# Patient Record
Sex: FEMALE | Race: WHITE | HISPANIC OR LATINO | Employment: PART TIME | URBAN - METROPOLITAN AREA
[De-identification: names, ages, dates, MRNs, and addresses within clinical notes are randomized per-mention and may not be internally consistent; named-entity substitution may affect disease eponyms.]

---
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
## 2020-01-02 ENCOUNTER — OFFICE VISIT (OUTPATIENT)
Dept: RHEUMATOLOGY | Facility: MEDICAL CENTER | Age: 20
End: 2020-01-02
Payer: MEDICAID

## 2020-01-02 VITALS
HEIGHT: 62 IN | BODY MASS INDEX: 24.51 KG/M2 | HEART RATE: 73 BPM | TEMPERATURE: 98 F | OXYGEN SATURATION: 96 % | DIASTOLIC BLOOD PRESSURE: 64 MMHG | SYSTOLIC BLOOD PRESSURE: 90 MMHG | WEIGHT: 133.2 LBS

## 2020-01-02 DIAGNOSIS — M32.9 SYSTEMIC LUPUS ERYTHEMATOSUS, UNSPECIFIED SLE TYPE, UNSPECIFIED ORGAN INVOLVEMENT STATUS (HCC): Primary | ICD-10-CM

## 2020-01-02 DIAGNOSIS — I73.00 RAYNAUD'S DISEASE WITHOUT GANGRENE: ICD-10-CM

## 2020-01-02 DIAGNOSIS — R21 RASH AND NONSPECIFIC SKIN ERUPTION: ICD-10-CM

## 2020-01-02 DIAGNOSIS — Z79.899 LONG-TERM USE OF PLAQUENIL: ICD-10-CM

## 2020-01-02 DIAGNOSIS — D72.810 LYMPHOPENIA: ICD-10-CM

## 2020-01-02 DIAGNOSIS — M35.1 MCTD (MIXED CONNECTIVE TISSUE DISEASE) (HCC): ICD-10-CM

## 2020-01-02 DIAGNOSIS — M31.19 THROMBOTIC THROMBOCYTOPENIC PURPURA (TTP) (HCC): ICD-10-CM

## 2020-01-02 DIAGNOSIS — R79.89 ELEVATED LFTS: ICD-10-CM

## 2020-01-02 DIAGNOSIS — Z79.899 HIGH RISK MEDICATION USE: ICD-10-CM

## 2020-01-02 PROCEDURE — 99214 OFFICE O/P EST MOD 30 MIN: CPT | Performed by: INTERNAL MEDICINE

## 2020-01-02 NOTE — PROGRESS NOTES
RHEUMATOLOGY CLINIC FOLLOW UP NOTE        Chief complaint:  follow up SLE        HPI:  20 y/o F with MCTD/SLE presents today for follow up,  last seen by me 4 weeks ago.      She remains on Plaquenil alternating 200 mg and 400 mg every other day,Imuran 75mg daily (decreased from 100mg at last visit), and now prednisone 20mg daily for the last 3 days.  She was doing well on prednisone 10 mg daily however the day before [place] states that she developed a rash on the back of her arms, regionally started as erythematous.  She doubled her dose of prednisone at my instruction 3 days ago and has started improving.  It was more pruritic than painful.  No rashes anywhere else.  She denies any fevers, oral ulcers, joint swelling or pain, chest pain, or shortness of breath.  She has had no recurrence of the rash on her lower extremities or on her face.  Denies any infections.  She reports that she was feeling very well prior to the onset of the rash and is still feeling well at this time.     Reports she is still not sexually active.  She has not seen gynecology yet to discuss birth control.    Patient had labs done today we do not have the results yet.                       Rheum Background:  Per Dr. Jean's notes:  Ms. Estelle Cowart presents today for evaluation for positive DENILSON in the context of positive SSA, Galeas, RNP low C4 as well as  TTP.     The patient recalls that she started to feel ill in November with no improvement.  She was then found to have thrombocytopenia and diagnosed with TTP 11/22/2017 with emergent transfer to [place] for plasma exchange.  While at [place] she was found to have positive serologies DENILSON 1:2560 and positive anti Ro, anti Galeas and Anti RNP.       Other associated symptoms she has experienced include decrease appetite, mild bruising, headaches associated with her episodes of low platelet,  and seen at [place] and treated with Rituximab and Plasmapheresis.      She does report a history  of Raynauds since childhood.  Her fingers will turn white and purple extending from the distal fingers up to the PIP joint.   Toes will also white.    She also reports hair loss that started after her hospitilization.  No sicca symptoms.  No rashes.  No mouth ulcers.      When we first met she had hair loss and prolonged morning stiffness      patient was admitted to the hospital on September 8 for fevers from 10 2-1 03.  This was associated with sore throat as well as nausea.  She did have some mild anemia however no evidence of hemolysis.  She was empirically provided with Augmentin, however treated for SLE flare with prednisone 40 mg daily.  She took this for 1 week and taper down to 30 mg at my direction at the end of last week.  2 to 3 days after this decreased she began developing a rash on her bilateral lower extremities, upper extremities and abdomen.  The rash resolved on her abdomen and arms, however has persisted on her legs though has been slowly improving.  She did go to the ED at which point labs were done and found to have stable hemoglobin and platelets.  She is also since followed up with her hematologist oncologist with stable hematologic work-up.        Meds:  Last infusion of Rituxan was January 2018.  Plaquenil  Imuran- started 10/2019      Pertinent Positives  FARA IgG positive, FARA C3d negative; ADAMTS acitivity < 5  DENILSON 1:2560, speckled  dsDNA +  SSA +  RNP positive at 224  Galeas positive  SSB negative  SM negative  Centromere and SCL-70 negative  TPO and microsomal ab negative  RF negative  Lupus anticoagulant negative     Hep B/C serologies negative 12/2017  Quantiferon negative 4/2018  G6PD adequate 4/2018     Imaging:     TTE 9/2018:  Normal left ventricular chamber size.  Left ventricular ejection fraction is visually estimated to be 65%.  Normal diastolic function.  No significant valve abnormalities.   Normal inferior vena cava size and inspiratory collapse.        Past Medical History:  "vitiligo, TTP, MCTD     Family History: hyperlipidemia; grandmother had diabetes     Social History: NEVER smoker, no alcohol            ROS:    GEN: denies fevers  Head: denies focal alopecia or hair thinning  ENT: denies change in vision, denies oral ulcers  CV:  no palpitations, no chest dyscomfort, no orthopnea  Pulm: no dyspnea, no cough  Skin: Per HPI  MS: no back pain, no joint swelling            OUTPATIENT MEDS:    Prior to Admission medications    Medication Sig Start Date End Date Taking? Authorizing Provider   azaTHIOprine (IMURAN) 50 MG Tab Take 1.5 Tabs by mouth every day. 12/4/19   Naomie Deshpande M.D.   predniSONE (DELTASONE) 10 MG Tab TAKE 1 TABLET BY MOUTH EVERY DAY 12/2/19   Naomie Deshpande M.D.   famotidine (PEPCID) 20 MG Tab Take 1 Tab by mouth 2 times a day.  Patient not taking: Reported on 11/14/2019 10/9/19   Naomie Deshpande M.D.   hydroxychloroquine (PLAQUENIL) 200 MG Tab Take 1-2 Tabs by mouth every day. 200 mg on Tuesday, Thursday, Saturday, Sunday and 400 mg on Monday, Wednesday, Friday 10/9/19   Naomie Deshpande M.D.   acetaminophen (TYLENOL) 325 MG Tab Take 650 mg by mouth every four hours as needed for Mild Pain.    Nn Emergency Md Per Protocol, M.D.           Past Medical History:   Diagnosis Date   • Alopecia    • Anemia    • Mixed connective tissue disease (HCC)    • Raynaud phenomenon    • SLE (systemic lupus erythematosus) ([facility])    • TTP (thrombotic thrombocytopenic purpura) (HCC)    • TTP (thrombotic thrombocytopenic purpura) ([facility])    • Vitiligo             reports that she has never smoked. She has never used smokeless tobacco. She reports that she does not drink alcohol or use drugs.             Physical Exam:     BP (!) 90/64 (BP Location: Left arm, Patient Position: Sitting, BP Cuff Size: Adult)   Pulse 73   Temp 36.7 °C (98 °F) (Temporal)   Ht 1.562 m (5' 1.5\")   Wt 60.4 kg (133 lb 3.2 oz)   SpO2 96%   BMI 24.76 kg/m²         GEN: well-appearing, NAD  Head: no focal " alopecia, no hair thinning  ENT: no conjunctival icterus or injection, no LAD, no oral ulcers  CV: nml S1, S2, no murmurs, RRR  Pulm: normal chest expansion, speaking in full sentences, CTAB, no wheezing  MUSCUSKELETAL:  no edema, dactylitis, entesitis     SHOULDERs:M no tenderness  ELBOWS:  no tenderness no synovitis  WRISTS:   no tenderness no synovitis  MCPS:   no tenderness no synovitis  PIPS:    no tenderness no synovitis  DIPS: no tenderness no synovitis  KNEES:  no tenderness no synovitis              ANKLES:  no tenderness no synovitis           MTPS: no tenderness  IP TOES: no tenderness no synovitis  SKIN: There is a nonraised, macular annular hyperpigmented, blanching rash on posterior bilateral upper arms extending to flank area              Labs:    Results for orders placed or performed during the hospital encounter of 10/25/19   EC-ECHOCARDIOGRAM COMPLETE W/O CONT   Result Value Ref Range    Eject.Frac. MOD BP 54.15     Eject.Frac. MOD 4C 54.42     Eject.Frac. MOD 2C 54.7     Left Ventrical Ejection Fraction 55          Impression/Plan:  1. Systemic lupus erythematosus, unspecified SLE type, unspecified organ involvement status (HCC) with flare  2.  Rash  3.  MCTD  4. Raynaud's disease without gangrene     Positive DENILSON, dsDNA, SM, RNP, SSA, and FARA IgG     SLE manifestations have been TTP requiring plasma exchange and rituxan, Raynaud's, hair loss, and morning stiffness.  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  New rash beginning on upper arms bilaterally resolving with increased prednisone.  Not consistent with palpable purpura.    We have added Imuran to help control her overall lupus, however unfortunately she did develop a rash while on this and had lymphopenia at the last visit requiring a decrease dose.  Repeat labs are now pending however given the  development of rash, I do not feel this is controlling her disease adequately we have been unable to get her up to have therapeutic dose due to lymphopenia.  I discussed with the patient alternative treatment options at this time including CellCept, Rituxan and Benlysta.  Although Rituxan is not generally used as a lupus maintenance medication, she was previously doing very well until this likely wore off, she has had TTP and vasculitis which are responsive to Rituxan, and there has been a recent publication supporting the use of Rituxan as a maintenance medication and SLE (PMID: 30111477).  After discussing these medications with the patient, patient does prefer to do another dose of Rituxan given how good she felt on it prior.  Risks of infection, infusion reaction and PML reviewed with the patient.     Labs December 2019 reviewed  CBC with WBC 2.8 and lymphopenia to 526, hemoglobin 11.5, platelets 346  CMP with potassium at 3.6, AST 46 (ULN 32), ALT 47 (ULN 32)  ESR improved to 48 (prior was 75), CRP now normal at 1.2 (prior 14.4)  C3 is low at 72 and C4 at 14, prior were both normal     Hep B/C serologies negative 12/2017  Quantiferon negative 4/2018  TPMT normal 10/2019     TTE 10/2019  Prior Echo - 9/24/18, no significant changes are noted.  Left ventricular ejection fraction is visually estimated to be 55%.  Normal diastolic function.  Unable to estimate pulmonary artery pressure due to an inadequate   tricuspid regurgitant jet.        Plan:  -Continue Imuran to 75 mg daily for now  -As above once labs return we will plan to start Rituxan, advised to stop and oriented few days prior to scheduled infusion  -Continue prednisone-20 mg daily for another 2 days, then 15 mg daily for 5 days, then return to 10 mg daily  - Continue hydroxychloroquine dose at  400mg on MWF and 200mg every other day  -Repeat CBC, CMP, ESR, CRP, C3, C4, and UA pending, patient had done today  -Recommend TTE annually given mixed  connective tissue disease picture for monitoring of RVSP, next due October 2020        5. Thrombotic thrombocytopenic purpura (TTP) (HCC)  6.  Lymphopenia  Labs November 2019 stable as above     Per hematology note 12/2018:  Diagnosed 11/22/17  Platelets 11, Hgb 7.5, Cr 0.63, LDH 2355  FARA IgG Positive, FARA C3d negative  [id number] 7 (11/23/17)   HUS negative  HAV IgG positive, Hep B negative, Hep C negative  Plasma exchange, 1.5 x volume with FFP daily x 5 days  Stable labs until 12/21/17 when platelets began to drop to 102, normal LDH, no MAHA findings on peripheral smear  Full relapse of disease with increased LDH to 452,  platelets to 15 and drop in Hgb to 8.2 -12/27  Admission for plasma exchange 12/28/17  S/P Rituximab Therapy 1/8/18, 1/15/18, 1/22/18, 1/29/18     Platelets and hemoglobin remained stable at this time  Patient aware to go to directly to ER for any worsening or confluence of rash     Continue follow-up with hematology     7. Long-term use of Plaquenil  CBC and CMP q. 6 months while on plaquenil- next due in July  Patient reports that she saw ophtho at the end of last year and was cleared to continue     Plan:  -CBC, CMP every 6 months while on Plaquenil  -Continue follow-up with ophthalmology at least annually- patient reports she saw 6/2019      8.  High risk medication use  Discussed risks of Imuran including GI upset, rash and infection and lab abnormalities  CBC, CMP in  every 3 months while on Imuran  Risks of Rituxan including infection, infusion reaction and PML reviewed with patient    9.  Elevated LFTs  AST and ALT both mildly elevated on most recent labs again, I do question if this could be related to her underlying disease  Repeat labs pending  Right upper quadrant ultrasound December 2019 unremarkable                       Patient will be in contact with me for any questions or concerns, will otherwise follow up with me as scheduled in 4 weeks.    This note was generated  using voice recognition software which has a small chance of producing errors of grammar and possibly content.  I have made every reasonable attempt to find and correct any obvious errors, but expect that some may not be found prior to finalization of this note.             Naomie Deshpande MD

## 2020-01-15 DIAGNOSIS — M32.9 SYSTEMIC LUPUS ERYTHEMATOSUS, UNSPECIFIED SLE TYPE, UNSPECIFIED ORGAN INVOLVEMENT STATUS (HCC): ICD-10-CM

## 2020-01-15 RX ORDER — PREDNISONE 10 MG/1
10 TABLET ORAL
Qty: 90 TAB | Refills: 0 | Status: SHIPPED | OUTPATIENT
Start: 2020-01-15 | End: 2020-02-06 | Stop reason: SDUPTHER

## 2020-01-15 NOTE — TELEPHONE ENCOUNTER
Was the patient seen in the last year in this department? Yes    Does patient have an active prescription for medications requested? No     Received Request Via: Pharmacy       I called infusion center regarding Rituxan and they are behind on faxes but they asked me to have patient called them to schedule. I provided this information to Patient

## 2020-01-22 ENCOUNTER — TELEPHONE (OUTPATIENT)
Dept: RHEUMATOLOGY | Facility: MEDICAL CENTER | Age: 20
End: 2020-01-22

## 2020-01-22 NOTE — TELEPHONE ENCOUNTER
Patient is scheduled for Rituxan Friday. Patient insurance has denied this. Infusion called to see if you would be able to do a Peer to peer with [phone] reference  Case number [id number]

## 2020-01-24 NOTE — TELEPHONE ENCOUNTER
I spoke with the physician and we do need to appeal, please fax a letter to [phone] or for [facility] [phone] with the following:        Re:  Rasheed Pappas  : 2000  Case number [id number] APPEAL REQUEST - URGENT  Please process appeal with a rheumatologist    To whom it may concern,    I would like to appeal the prior denial of Rituxan in the case of Ms. Pappas.  Our patient has SLE with overlap features of mixed connective tissue disease with history of TTP requiring plasmapheresis and Rituxan.  Rituxan has previously controlled her underlying autoimmune condition maintaining disease control until wearing off.  We have tried Plaquenil which unfortunately has not been adequate in controlling her disease. We have also tried Imuran which she did not tolerate due to severe lymphopenia and also uncontrolled disease.  She has developed leukocytoclastic vasculitis as well as joint pains, and with her overall condition and prior stable disease while on Rituxan, I do feel Rituxan as a maintenance medication would be beneficial.  There has also been recent evidence supporting the use of Rituxan as maintenance medication in SLE PMID: [id number].  Thank you for your reconsideration.        Naomie Deshpande MD

## 2020-01-31 ENCOUNTER — APPOINTMENT (OUTPATIENT)
Dept: ONCOLOGY | Facility: MEDICAL CENTER | Age: 20
End: 2020-01-31
Attending: INTERNAL MEDICINE
Payer: MEDICAID

## 2020-02-06 ENCOUNTER — OFFICE VISIT (OUTPATIENT)
Dept: RHEUMATOLOGY | Facility: MEDICAL CENTER | Age: 20
End: 2020-02-06
Payer: MEDICAID

## 2020-02-06 ENCOUNTER — APPOINTMENT (OUTPATIENT)
Dept: RHEUMATOLOGY | Facility: MEDICAL CENTER | Age: 20
End: 2020-02-06
Payer: MEDICAID

## 2020-02-06 VITALS
WEIGHT: 135 LBS | OXYGEN SATURATION: 94 % | HEART RATE: 76 BPM | RESPIRATION RATE: 16 BRPM | TEMPERATURE: 98.1 F | BODY MASS INDEX: 25.09 KG/M2 | SYSTOLIC BLOOD PRESSURE: 102 MMHG | DIASTOLIC BLOOD PRESSURE: 70 MMHG

## 2020-02-06 DIAGNOSIS — M31.19 THROMBOTIC THROMBOCYTOPENIC PURPURA (TTP) (HCC): ICD-10-CM

## 2020-02-06 DIAGNOSIS — Z79.899 LONG-TERM USE OF PLAQUENIL: ICD-10-CM

## 2020-02-06 DIAGNOSIS — Z79.899 HIGH RISK MEDICATION USE: ICD-10-CM

## 2020-02-06 DIAGNOSIS — M32.9 SYSTEMIC LUPUS ERYTHEMATOSUS, UNSPECIFIED SLE TYPE, UNSPECIFIED ORGAN INVOLVEMENT STATUS (HCC): Primary | ICD-10-CM

## 2020-02-06 DIAGNOSIS — R79.89 ELEVATED LFTS: ICD-10-CM

## 2020-02-06 DIAGNOSIS — D72.810 LYMPHOPENIA: ICD-10-CM

## 2020-02-06 DIAGNOSIS — M31.0 LEUKOCYTOCLASTIC VASCULITIS (HCC): ICD-10-CM

## 2020-02-06 DIAGNOSIS — I73.00 RAYNAUD'S DISEASE WITHOUT GANGRENE: ICD-10-CM

## 2020-02-06 DIAGNOSIS — M35.1 MCTD (MIXED CONNECTIVE TISSUE DISEASE) (HCC): ICD-10-CM

## 2020-02-06 PROCEDURE — 99214 OFFICE O/P EST MOD 30 MIN: CPT | Performed by: INTERNAL MEDICINE

## 2020-02-06 RX ORDER — PREDNISONE 10 MG/1
10 TABLET ORAL
Qty: 90 TAB | Refills: 1 | Status: SHIPPED | OUTPATIENT
Start: 2020-02-06 | End: 2020-04-28

## 2020-02-06 RX ORDER — HYDROXYCHLOROQUINE SULFATE 200 MG/1
200-400 TABLET, FILM COATED ORAL DAILY
Qty: 135 TAB | Refills: 3 | Status: SHIPPED | OUTPATIENT
Start: 2020-02-06 | End: 2020-02-10

## 2020-02-06 NOTE — PROGRESS NOTES
RHEUMATOLOGY CLINIC FOLLOW UP NOTE        Chief complaint:  follow up SLE        HPI:  18 y/o F with MCTD/SLE presents today for follow up,  last seen by me 4 weeks ago.      She remains on Plaquenil alternating 200 mg and 400 mg every other day and prednisone 10mg daily. She has been doing well overall. The patient has stopped taking Imuran per our instructions as she became lymphopenic with a rash and was not experiencing benefits from the medication. The patient denies any joint pain. Denies any oral ulcers. Denies any new skin rashes. Denies any GI upset.     The patient states that she discontinued Imuran two weeks ago. She is a student and has been going to class regularly Monday through Friday without any recurrence of her symptoms since she was last seen.  Additionally denies any fevers or infections.  She reports today that her prior back pain has resolved.  She does still have some hyperpigmentation from her prior rash on the back of her arms.         Rheum Background:  Per Dr. Jean's notes:  Ms. Estelle Cowart presents today for evaluation for positive DENILSON in the context of positive SSA, Galeas, RNP low C4 as well as  TTP.     The patient recalls that she started to feel ill in November with no improvement.  She was then found to have thrombocytopenia and diagnosed with TTP 11/22/2017 with emergent transfer to [facility] for plasma exchange.  While at [facility] she was found to have positive serologies DENILSON 1:2560 and positive anti Ro, anti Galeas and Anti RNP.       Other associated symptoms she has experienced include decrease appetite, mild bruising, headaches associated with her episodes of low platelet,  and seen at [facility] and treated with Rituximab and Plasmapheresis.      She does report a history of Raynauds since childhood.  Her fingers will turn white and purple extending from the distal fingers up to the PIP joint.   Toes will also white.    She also reports hair loss that started after her  hospitilization.  No sicca symptoms.  No rashes.  No mouth ulcers.      When we first met she had hair loss and prolonged morning stiffness      patient was admitted to the hospital on September 8 for fevers from 10 2-1 03.  This was associated with sore throat as well as nausea.  She did have some mild anemia however no evidence of hemolysis.  She was empirically provided with Augmentin, however treated for SLE flare with prednisone 40 mg daily.  She took this for 1 week and taper down to 30 mg at my direction at the end of last week.  2 to 3 days after this decreased she began developing a rash on her bilateral lower extremities, upper extremities and abdomen.  The rash resolved on her abdomen and arms, however has persisted on her legs though has been slowly improving.  She did go to the ED at which point labs were done and found to have stable hemoglobin and platelets.  She is also since followed up with her hematologist oncologist with stable hematologic work-up.        Meds:  Last infusion of Rituxan was January 2018.  Plaquenil 200mg alternating with 400mg every other day.  Imuran- started 10/2019, discontinued in 1/2020.     Pertinent Positives  FARA IgG positive, FARA C3d negative; ADAMTS acitivity < 5  DENILSON 1:2560, speckled  dsDNA +  SSA +  RNP positive at 224  Galeas positive  SSB negative  SM negative  Centromere and SCL-70 negative  TPO and microsomal ab negative  RF negative  Lupus anticoagulant negative     Hep B/C serologies negative 12/2017  Quantiferon negative 4/2018  G6PD adequate 4/2018     Imaging:     TTE 9/2018:  Normal left ventricular chamber size.  Left ventricular ejection fraction is visually estimated to be 65%.  Normal diastolic function.  No significant valve abnormalities.   Normal inferior vena cava size and inspiratory collapse.     Past Medical History: vitiligo, TTP, MCTD     Family History: hyperlipidemia; grandmother had diabetes     Social History: NEVER smoker, no  alcohol       ROS:    GEN: denies fevers  CV:  no palpitations, no chest dyscomfort, no orthopnea  Pulm: no dyspnea, no cough  Skin: no rashes currently  MS: no back pain, no joint swelling            OUTPATIENT MEDS:    Prior to Admission medications    Medication Sig Start Date End Date Taking? Authorizing Provider   predniSONE (DELTASONE) 10 MG Tab Take 1 Tab by mouth every day. 1/15/20   Naomie Deshpande M.D.   famotidine (PEPCID) 20 MG Tab Take 1 Tab by mouth 2 times a day.  Patient not taking: Reported on 11/14/2019 10/9/19   Naomie Deshpande M.D.   hydroxychloroquine (PLAQUENIL) 200 MG Tab Take 1-2 Tabs by mouth every day. 200 mg on Tuesday, Thursday, Saturday, Sunday and 400 mg on Monday, Wednesday, Friday 10/9/19   Naomie Deshpande M.D.   acetaminophen (TYLENOL) 325 MG Tab Take 650 mg by mouth every four hours as needed for Mild Pain.    Nn Emergency Md Per SHAE Ann           Past Medical History:   Diagnosis Date   • Alopecia    • Anemia    • Mixed connective tissue disease (HCC)    • Raynaud phenomenon    • SLE (systemic lupus erythematosus) (HCC)    • TTP (thrombotic thrombocytopenic purpura) (HCC)    • TTP (thrombotic thrombocytopenic purpura) (HCC)    • Vitiligo             reports that she has never smoked. She has never used smokeless tobacco. She reports that she does not drink alcohol or use drugs.             Physical Exam:   Vitals:    02/06/20 1508   BP: 102/70   Pulse: 76   Resp: 16   Temp: 36.7 °C (98.1 °F)   SpO2: 94%           GEN: well-appearing, NAD  Head: no focal alopecia, no hair thinning  ENT: no conjunctival icterus or injection, no LAD, no oral ulcers  CV: nml S1, S2, no murmurs, RRR  Pulm: normal chest expansion, speaking in full sentences, CTAB, no wheezing  MUSCUSKELETAL:  no edema, dactylitis, entesitis     ELBOWS:  no tenderness no synovitis  WRISTS:   no tenderness no synovitis    MCPS:   no tenderness no synovitis  PIPS:    no tenderness no synovitis  DIPS: no tenderness no  synovitis  KNEES:  no tenderness no synovitis              ANKLES:  no tenderness no synovitis           MTPS: no tenderness  IP TOES: no tenderness no synovitis  SKIN: no rashes, she does have nonraised hyperpigmentation on posterior arms bilaterally        Labs:    Results for orders placed or performed during the hospital encounter of 10/25/19   EC-ECHOCARDIOGRAM COMPLETE W/O CONT   Result Value Ref Range    Eject.Frac. MOD BP 54.15     Eject.Frac. MOD 4C 54.42     Eject.Frac. MOD 2C 54.7     Left Ventrical Ejection Fraction 55          Impression/Plan:  1. Systemic lupus erythematosus, unspecified SLE type, unspecified organ involvement status (HCC) with flare  2.  MCTD  3. Raynaud's disease without gangrene  4.  Leukocytoclastic vasculitis     Positive DENILSON, dsDNA, SM, RNP, SSA, and FARA IgG     Prior SLE manifestations have been acute TTP requiring plasma exchange and rituxan, Raynaud's, hair loss, morning stiffness, and joint pain. In 8/2019, the patient had development of an acute SLE rash on face and likely leukocytoclastic vasculitis along with fever of unknown origin. These symptoms have all since resolved with increase of prednisone.      We tried Imuran however had to discontinue due to lymphopenia and was not beneficial for disease control as she continued to have leukocytoclastic vasculitis.   The patient has responded well to Rituxan in the past and would like to continue this medication as it does seem she flares as it wears off and I would like to prevent any recurrence of TTP. Rituxan in a recent study has been shown to be effective for lupus maintenance as well.  This was originally denied by insurance however I have sent an appeal letter and we are awaiting response.  Benlysta is an alternative option going forward for SLE maintenance if we are unable to get Rituxan approved.     Labs 1/2/2020 reviewed:  CBC with lymphopenia to 492, WBC 4.0, Hb 12.8, Plt 305  CMP with K 3.7, otherwise normal (LFTs  normal on these labs)  ESR 29, CRP 9.7  C3 118 and C4 25 (both normal)  UA without blood or protein     Hep B/C serologies negative 12/2017  Quantiferon negative 4/2018  TPMT normal 10/2019     TTE 10/2019  Prior Echo - 9/24/18, no significant changes are noted.  Left ventricular ejection fraction is visually estimated to be 55%.  Normal diastolic function.  Unable to estimate pulmonary artery pressure due to an inadequate   tricuspid regurgitant jet.        Plan:  -Start Rituxan if approved. Discussed risks of rituxan including PML, infection, and infusion reaction with patient.  -If Rituxan is not approved, we will start Benlysta. Discussed risks and benefits of Benlysta with the patient.  Discussed would likely do weekly injections  -Continue prednisone 10 mg daily for now  - Continue hydroxychloroquine dose at  400mg on MWF and 200mg every other day  -Repeat CBC, CMP, ESR, CRP, C3, C4, and UA prior to the patient's infusion appointment.  -Recommend TTE annually given mixed connective tissue disease picture for monitoring of RVSP, next due October 2020        5. Thrombotic thrombocytopenic purpura (TTP) (HCC)  6.  Lymphopenia  Labs stable as of last appointment as above     Per hematology note 12/2018:  Diagnosed 11/22/17  Platelets 11, Hgb 7.5, Cr 0.63, LDH 2355  FARA IgG Positive, FARA C3d negative  [id number] 7 (11/23/17)   HUS negative  HAV IgG positive, Hep B negative, Hep C negative  Plasma exchange, 1.5 x volume with FFP daily x 5 days  Stable labs until 12/21/17 when platelets began to drop to 102, normal LDH, no MAHA findings on peripheral smear  Full relapse of disease with increased LDH to 452,  platelets to 15 and drop in Hgb to 8.2 -12/27  Admission for plasma exchange 12/28/17  S/P Rituximab Therapy 1/8/18, 1/15/18, 1/22/18, 1/29/18     Platelets and hemoglobin remain stable at this time    Plan:  -Patient aware to go to directly to ER for any worsening or confluence of rash  -Continue to  follow-up with hematology     7. Long-term use of Plaquenil  CBC and CMP q. 6 months while on plaquenil- next due in July  Patient reports that she saw ophtho at the end of last year and was cleared to continue     Plan:  -CBC, CMP every 6 months while on Plaquenil  -Continue follow-up with ophthalmology at least annually- patient reports she saw 6/2019      8.  High risk medication use  Risks of Rituxan including infection, infusion reaction and PML reviewed with patient  Risks of Benlysta discussed with the patient.    9.  Elevated LFTs  AST and ALT both previously mildly elevated, normal on labs January 2020  Right upper quadrant ultrasound December 2019 unremarkable      Patient will be in contact with me for any questions or concerns, will otherwise follow up with me as scheduled in 2 months.  If we are unable to get the patient in with a rheumatologist in [place], she is agreeable to see me in [place] as I am leaving the practice.    At the time of the visit, I personally examined the patient and evaluated the patient's medical history, physical examination, laboratory results/studies, and assessment and I discussed the findings and formulated the care plan documented with the resident or fellow physician.          Naomie Deshpande MD

## 2020-02-06 NOTE — PATIENT INSTRUCTIONS
Belimumab solution for injection  What is this medicine?  BELIMUMAB (be NEVES ue mab) is a medicine used to treat active systemic lupus erythematosus (SLE). This medicine is used with standard therapy for SLE. It is not a cure.  This medicine may be used for other purposes; ask your health care provider or pharmacist if you have questions.  COMMON BRAND NAME(S): Sanfordlysana maria, Benlysta SC  What should I tell my health care provider before I take this medicine?  They need to know if you have any of these conditions:  -heart disease  -immune system problems  -infection (especially a virus infection such as chickenpox, cold sores, or herpes)  -mental illness  -recently received or scheduled to receive a vaccine  -suicidal thoughts, plans, or attempt; a previous suicide attempt by you or a family member  -an unusual or allergic reaction to belimumab, other medicines, foods, dyes, or preservatives  -pregnant or trying to get pregnant  -breast-feeding  How should I use this medicine?  This medicine is for infusion into a vein or for injection under the skin. Infusions are given by a health care professional in a hospital or clinic setting. If you are to give your own medicine at home, you will be taught how to prepare and give this medicine under the skin. Use exactly as directed. Take your medicine at regular intervals. Do not take your medicine more often than directed.  It is important that you put your used needles and syringes in a special sharps container. Do not put them in a trash can. If you do not have a sharps container, call your pharmacist or healthcare provider to get one.  A special MedGuide will be given to you by the pharmacist with each prescription and refill. Be sure to read this information carefully each time.  Talk to your pediatrician regarding the use of this medicine in children. Special care may be needed.  Overdosage: If you think you have taken too much of this medicine contact a poison control  center or emergency room at once.  NOTE: This medicine is only for you. Do not share this medicine with others.  What if I miss a dose?  This medicine is used once a week if given by injection under the skin. If you miss a dose, take it as soon as you can. If it is almost time for your next dose, take only that dose. Do not take double or extra doses.  If you are to be given an infusion, it is important not to miss your dose. Call your doctor or health care professional if you are unable to keep an appointment.  What may interact with this medicine?  Do not take this medicine with any of the following medications:  -live virus vaccines  This medicine may also interact with the following medications:  -cyclophosphamide  -ocrelizumab  -ofatumumab  -rituximab  This list may not describe all possible interactions. Give your health care provider a list of all the medicines, herbs, non-prescription drugs, or dietary supplements you use. Also tell them if you smoke, drink alcohol, or use illegal drugs. Some items may interact with your medicine.  What should I watch for while using this medicine?  Tell your doctor or healthcare professional if your symptoms do not start to get better or if they get worse.  In some patients, this medicine may cause a serious brain infection that may cause death. If you have any problems seeing, thinking, speaking, walking, or standing, tell your doctor right away. If you cannot reach your doctor, urgently seek other source of medical care.  Talk to your doctor about your risk of cancer. You may be more at risk for certain types of cancers if you take this medicine.  What side effects may I notice from receiving this medicine?  Side effects that you should report to your doctor or health care professional as soon as possible:  -allergic reactions like skin rash, itching or hives, swelling of the face, lips, or tongue  -breathing problems  -chest pain  -depressed mood  -dizziness  -feeling  faint or lightheaded  -signs of infection - fever or chills, cough, sore throat, pain or difficulty passing urine  -suicidal thoughts or other mood changes  -trouble sleeping  -unusually slow heartbeat  Side effects that usually do not require medical attention (report to your doctor or health care professional if they continue or are bothersome):  -diarrhea  -headache  -muscle aches  -nausea  -pain, redness or irritation at site of injection  -stuffy or runny nose  This list may not describe all possible side effects. Call your doctor for medical advice about side effects. You may report side effects to FDA at 3-378-FDA-9483.  Where should I keep my medicine?  Infusions will be given in a hospital or clinic and will not be stored at home.  Storage for syringes and autoinjectors stored at home:  Keep out of the reach of children. Store in a refrigerator between 2 and 8 degrees C (36 and 46 degrees F). Keep this medicine in the original container. Protect from light. Do not freeze. Do not shake. Do not use this medicine and do not place back in the refrigerator if left out at room temperature for more than 12 hours. Throw away any unused medicine after the expiration date.  NOTE: This sheet is a summary. It may not cover all possible information. If you have questions about this medicine, talk to your doctor, pharmacist, or health care provider.  © 2018 [employer]/Gold Standard (2017-07-25 17:05:37)

## 2020-02-07 ENCOUNTER — TELEPHONE (OUTPATIENT)
Dept: RHEUMATOLOGY | Facility: MEDICAL CENTER | Age: 20
End: 2020-02-07

## 2020-02-07 DIAGNOSIS — M32.9 SYSTEMIC LUPUS ERYTHEMATOSUS, UNSPECIFIED SLE TYPE, UNSPECIFIED ORGAN INVOLVEMENT STATUS (HCC): ICD-10-CM

## 2020-02-08 NOTE — TELEPHONE ENCOUNTER
[facility] provided me the phone number to follow up on the status of the Appeal for patients Rituxan that was faxed over on 20 per Dr Deshpande's peer to peer review.   I verified with insurance I had the correct fax number on file and I did get a confirmation that my fax went thru 20. However they could not find my appeal  and asked me again to keep faxing it until it goes thru. I let insurance know that yesterday I faxed it multiple times and all faxes fail. They asked me to keep trying until it goes thru. Today I have faxed appeal 10 times and it has failed again.     Last phone encounter note     [facility] peer to peer physicians line [phone]           Naomie Deshpande M.D.           10:23 AM   Note      I spoke with the physician and we do need to appeal, please fax a letter to [phone] or for [facility] [phone] with the following:           Re:  Rasheed Pappas  : 2000  Case number [id number] APPEAL REQUEST - URGENT  Please process appeal with a rheumatologist     To whom it may concern,     I would like to appeal the prior denial of Rituxan in the case of Ms. Pappas.  Our patient has SLE with overlap features of mixed connective tissue disease with history of TTP requiring plasmapheresis and Rituxan.  Rituxan has previously controlled her underlying autoimmune condition maintaining disease control until wearing off.  We have tried Plaquenil which unfortunately has not been adequate in controlling her disease. We have also tried Imuran which she did not tolerate due to severe lymphopenia and also uncontrolled disease.  She has developed leukocytoclastic vasculitis as well as joint pains, and with her overall condition and prior stable disease while on Rituxan, I do feel Rituxan as a maintenance medication would be beneficial.  There has also been recent evidence supporting the use of Rituxan as maintenance medication in SLE PMID: [id number].  Thank you for your reconsideration.

## 2020-02-10 RX ORDER — HYDROXYCHLOROQUINE SULFATE 200 MG/1
TABLET, FILM COATED ORAL
Qty: 135 TAB | Refills: 3 | Status: SHIPPED | OUTPATIENT
Start: 2020-02-10 | End: 2020-06-23 | Stop reason: SDUPTHER

## 2020-02-10 NOTE — TELEPHONE ENCOUNTER
Appeal Letter has been re-faxed all morning with all failed confirmations coming thru. I will continue to re-fax all day

## 2020-02-10 NOTE — TELEPHONE ENCOUNTER
Received request via: [place]  [facility] [place]  Was the patient seen in the last year in this department? Yes    Does the patient have an active prescription (recently filled or refills available) for medication(s) requested? No

## 2020-02-10 NOTE — TELEPHONE ENCOUNTER
I received a phone call that they confirmed receipt of the appeal letter. They will respond within 72 hours.

## 2020-02-10 NOTE — TELEPHONE ENCOUNTER
Please re-fax the letter to [phone] [employer].        I spoke with the company and provided an appeal over the phone, they would also like the fax resent.  They report a turnaround time of 72 hours.

## 2020-02-10 NOTE — TELEPHONE ENCOUNTER
I called and spoke with the physician who stated he will contact the office to determine the issue, I have provided my personal cell and they will be in contact with me today.

## 2020-02-12 NOTE — TELEPHONE ENCOUNTER
Can you please fax labs I just ordered today  To infusion center to have done with her first rituxan infusion please.

## 2020-02-12 NOTE — TELEPHONE ENCOUNTER
Left detailed message for patient included infusion centers phone number for her to reach as well. Called infusion spoke with leonidas they will reach out to patient to schedule and will make a note to draw labs with her first infusion. I do not have to fax them since they are in Epic. I will scan Authorization approval letter into media as soon as I receive it.

## 2020-02-23 ENCOUNTER — OUTPATIENT INFUSION SERVICES (OUTPATIENT)
Dept: ONCOLOGY | Facility: MEDICAL CENTER | Age: 20
End: 2020-02-23
Attending: INTERNAL MEDICINE
Payer: MEDICAID

## 2020-02-23 VITALS
SYSTOLIC BLOOD PRESSURE: 109 MMHG | DIASTOLIC BLOOD PRESSURE: 62 MMHG | TEMPERATURE: 97.5 F | HEART RATE: 89 BPM | HEIGHT: 62 IN | RESPIRATION RATE: 18 BRPM | OXYGEN SATURATION: 92 % | BODY MASS INDEX: 25.11 KG/M2 | WEIGHT: 136.47 LBS

## 2020-02-23 DIAGNOSIS — M32.0 DRUG-INDUCED SYSTEMIC LUPUS ERYTHEMATOSUS, UNSPECIFIED ORGAN INVOLVEMENT STATUS (HCC): ICD-10-CM

## 2020-02-23 DIAGNOSIS — M31.19 THROMBOTIC THROMBOCYTOPENIC PURPURA (TTP) (HCC): ICD-10-CM

## 2020-02-23 LAB
ALBUMIN SERPL BCP-MCNC: 4.2 G/DL (ref 3.2–4.9)
ALBUMIN/GLOB SERPL: 1.3 G/DL
ALP SERPL-CCNC: 55 U/L (ref 30–99)
ALT SERPL-CCNC: 25 U/L (ref 2–50)
ANION GAP SERPL CALC-SCNC: 9 MMOL/L (ref 0–11.9)
AST SERPL-CCNC: 26 U/L (ref 12–45)
BASOPHILS # BLD AUTO: 0.2 % (ref 0–1.8)
BASOPHILS # BLD: 0.01 K/UL (ref 0–0.12)
BILIRUB SERPL-MCNC: 0.2 MG/DL (ref 0.1–1.5)
BUN SERPL-MCNC: 14 MG/DL (ref 8–22)
C3 SERPL-MCNC: 117 MG/DL (ref 87–200)
C4 SERPL-MCNC: 25 MG/DL (ref 19–52)
CALCIUM SERPL-MCNC: 9 MG/DL (ref 8.5–10.5)
CHLORIDE SERPL-SCNC: 108 MMOL/L (ref 96–112)
CO2 SERPL-SCNC: 22 MMOL/L (ref 20–33)
CREAT SERPL-MCNC: 0.73 MG/DL (ref 0.5–1.4)
CRP SERPL HS-MCNC: 0.22 MG/DL (ref 0–0.75)
EOSINOPHIL # BLD AUTO: 0.06 K/UL (ref 0–0.51)
EOSINOPHIL NFR BLD: 1.2 % (ref 0–6.9)
ERYTHROCYTE [DISTWIDTH] IN BLOOD BY AUTOMATED COUNT: 45.3 FL (ref 35.9–50)
ERYTHROCYTE [SEDIMENTATION RATE] IN BLOOD BY WESTERGREN METHOD: 19 MM/HOUR (ref 0–20)
GLOBULIN SER CALC-MCNC: 3.3 G/DL (ref 1.9–3.5)
GLUCOSE SERPL-MCNC: 72 MG/DL (ref 65–99)
HCT VFR BLD AUTO: 35.7 % (ref 37–47)
HGB BLD-MCNC: 11.7 G/DL (ref 12–16)
IMM GRANULOCYTES # BLD AUTO: 0.03 K/UL (ref 0–0.11)
IMM GRANULOCYTES NFR BLD AUTO: 0.6 % (ref 0–0.9)
LYMPHOCYTES # BLD AUTO: 0.72 K/UL (ref 1–4.8)
LYMPHOCYTES NFR BLD: 14.3 % (ref 22–41)
MCH RBC QN AUTO: 28.8 PG (ref 27–33)
MCHC RBC AUTO-ENTMCNC: 32.8 G/DL (ref 33.6–35)
MCV RBC AUTO: 87.9 FL (ref 81.4–97.8)
MONOCYTES # BLD AUTO: 0.87 K/UL (ref 0–0.85)
MONOCYTES NFR BLD AUTO: 17.2 % (ref 0–13.4)
NEUTROPHILS # BLD AUTO: 3.36 K/UL (ref 2–7.15)
NEUTROPHILS NFR BLD: 66.5 % (ref 44–72)
NRBC # BLD AUTO: 0 K/UL
NRBC BLD-RTO: 0 /100 WBC
PLATELET # BLD AUTO: 265 K/UL (ref 164–446)
PMV BLD AUTO: 9.9 FL (ref 9–12.9)
POTASSIUM SERPL-SCNC: 3.4 MMOL/L (ref 3.6–5.5)
PROT SERPL-MCNC: 7.5 G/DL (ref 6–8.2)
RBC # BLD AUTO: 4.06 M/UL (ref 4.2–5.4)
SODIUM SERPL-SCNC: 139 MMOL/L (ref 135–145)
WBC # BLD AUTO: 5.1 K/UL (ref 4.8–10.8)

## 2020-02-23 PROCEDURE — 86160 COMPLEMENT ANTIGEN: CPT | Mod: 91

## 2020-02-23 PROCEDURE — 96375 TX/PRO/DX INJ NEW DRUG ADDON: CPT

## 2020-02-23 PROCEDURE — 700111 HCHG RX REV CODE 636 W/ 250 OVERRIDE (IP): Performed by: INTERNAL MEDICINE

## 2020-02-23 PROCEDURE — 86140 C-REACTIVE PROTEIN: CPT

## 2020-02-23 PROCEDURE — 96415 CHEMO IV INFUSION ADDL HR: CPT

## 2020-02-23 PROCEDURE — 85652 RBC SED RATE AUTOMATED: CPT

## 2020-02-23 PROCEDURE — 88184 FLOWCYTOMETRY/ TC 1 MARKER: CPT

## 2020-02-23 PROCEDURE — 700105 HCHG RX REV CODE 258: Performed by: INTERNAL MEDICINE

## 2020-02-23 PROCEDURE — 96374 THER/PROPH/DIAG INJ IV PUSH: CPT

## 2020-02-23 PROCEDURE — 88185 FLOWCYTOMETRY/TC ADD-ON: CPT

## 2020-02-23 PROCEDURE — 96413 CHEMO IV INFUSION 1 HR: CPT

## 2020-02-23 PROCEDURE — A9270 NON-COVERED ITEM OR SERVICE: HCPCS | Performed by: INTERNAL MEDICINE

## 2020-02-23 PROCEDURE — 88187 FLOWCYTOMETRY/READ 2-8: CPT

## 2020-02-23 PROCEDURE — 700102 HCHG RX REV CODE 250 W/ 637 OVERRIDE(OP): Performed by: INTERNAL MEDICINE

## 2020-02-23 PROCEDURE — 85025 COMPLETE CBC W/AUTO DIFF WBC: CPT

## 2020-02-23 PROCEDURE — 80053 COMPREHEN METABOLIC PANEL: CPT

## 2020-02-23 RX ORDER — ACETAMINOPHEN 500 MG
500 TABLET ORAL ONCE
Status: COMPLETED | OUTPATIENT
Start: 2020-02-23 | End: 2020-02-23

## 2020-02-23 RX ORDER — METHYLPREDNISOLONE SODIUM SUCCINATE 125 MG/2ML
100 INJECTION, POWDER, LYOPHILIZED, FOR SOLUTION INTRAMUSCULAR; INTRAVENOUS ONCE
Status: COMPLETED | OUTPATIENT
Start: 2020-02-23 | End: 2020-02-23

## 2020-02-23 RX ORDER — DIPHENHYDRAMINE HCL 25 MG
25 TABLET ORAL ONCE
Status: COMPLETED | OUTPATIENT
Start: 2020-02-23 | End: 2020-02-23

## 2020-02-23 RX ADMIN — DIPHENHYDRAMINE HCL 25 MG: 25 TABLET ORAL at 10:53

## 2020-02-23 RX ADMIN — ACETAMINOPHEN 500 MG: 500 TABLET, FILM COATED ORAL at 10:53

## 2020-02-23 RX ADMIN — METHYLPREDNISOLONE SODIUM SUCCINATE 100 MG: 125 INJECTION, POWDER, FOR SOLUTION INTRAMUSCULAR; INTRAVENOUS at 10:53

## 2020-02-23 RX ADMIN — RITUXIMAB 1000 MG: 10 INJECTION, SOLUTION INTRAVENOUS at 11:27

## 2020-02-23 NOTE — PROGRESS NOTES
Chemotherapy Verification - PRIMARY RN      Height = 157 cm  Weight = 61.9 kg  BSA = 1.64 m2       Medication: Rituxan  Dose: 1000 mg set dose  Calculated Dose:  1000 mg                             (In mg/m2, AUC, mg/kg)     I confirm this process was performed independently with the BSA and all final chemotherapy dosing calculations congruent.  Any discrepancies of 10% or greater have been addressed with the chemotherapy pharmacist. The resolution of the discrepancy has been documented in the EPIC progress notes.

## 2020-02-23 NOTE — PROGRESS NOTES
Chemotherapy Verification - SECONDARY RN       Height = 157 cm  Weight = 61.9 kg  BSA = 1.64 m2       Medication: Rituxan  Dose: 1,000 mg set dose  Calculated Dose: 1,000 mg set dose                             (In mg/m2, AUC, mg/kg)       I confirm that this process was performed independently.

## 2020-02-24 NOTE — PROGRESS NOTES
Pt arrived ambulatory to [facility] for day 1 of Rituxan.  POC discussed.  Pt denies infections today.  PIV started to LFA, blood return confirmed and labs drawn as ordered.  Results reviewed.  Premedication given and were tolerated well.  Rituxan infused via initial rate protocol.  No adverse reaction noted.  PIV flushed, removed, and site covered with gauze and coban.  Next appointment confirmed.  Pt discharged from [facility] in [place] with family member.

## 2020-02-25 LAB
CD19 CELLS NFR BLD: 24 %
CD20 CELLS NFR BLD: 24 %
PATH INTERP SPEC-IMP: NORMAL

## 2020-03-08 ENCOUNTER — OUTPATIENT INFUSION SERVICES (OUTPATIENT)
Dept: ONCOLOGY | Facility: MEDICAL CENTER | Age: 20
End: 2020-03-08
Attending: INTERNAL MEDICINE
Payer: MEDICAID

## 2020-03-08 VITALS
DIASTOLIC BLOOD PRESSURE: 63 MMHG | WEIGHT: 134.7 LBS | HEART RATE: 91 BPM | TEMPERATURE: 97.6 F | SYSTOLIC BLOOD PRESSURE: 111 MMHG | HEIGHT: 62 IN | OXYGEN SATURATION: 96 % | BODY MASS INDEX: 24.79 KG/M2 | RESPIRATION RATE: 18 BRPM

## 2020-03-08 DIAGNOSIS — M31.19 THROMBOTIC THROMBOCYTOPENIC PURPURA (TTP) (HCC): ICD-10-CM

## 2020-03-08 DIAGNOSIS — M32.0 DRUG-INDUCED SYSTEMIC LUPUS ERYTHEMATOSUS, UNSPECIFIED ORGAN INVOLVEMENT STATUS (HCC): Primary | ICD-10-CM

## 2020-03-08 LAB
ALBUMIN SERPL BCP-MCNC: 4.2 G/DL (ref 3.2–4.9)
ALBUMIN/GLOB SERPL: 1.1 G/DL
ALP SERPL-CCNC: 65 U/L (ref 30–99)
ALT SERPL-CCNC: 25 U/L (ref 2–50)
ANION GAP SERPL CALC-SCNC: 10 MMOL/L (ref 0–11.9)
AST SERPL-CCNC: 22 U/L (ref 12–45)
BASOPHILS # BLD AUTO: 0.4 % (ref 0–1.8)
BASOPHILS # BLD: 0.02 K/UL (ref 0–0.12)
BILIRUB SERPL-MCNC: 0.3 MG/DL (ref 0.1–1.5)
BUN SERPL-MCNC: 12 MG/DL (ref 8–22)
CALCIUM SERPL-MCNC: 9.7 MG/DL (ref 8.5–10.5)
CHLORIDE SERPL-SCNC: 105 MMOL/L (ref 96–112)
CO2 SERPL-SCNC: 22 MMOL/L (ref 20–33)
CREAT SERPL-MCNC: 0.68 MG/DL (ref 0.5–1.4)
EOSINOPHIL # BLD AUTO: 0.06 K/UL (ref 0–0.51)
EOSINOPHIL NFR BLD: 1.3 % (ref 0–6.9)
ERYTHROCYTE [DISTWIDTH] IN BLOOD BY AUTOMATED COUNT: 42.5 FL (ref 35.9–50)
GLOBULIN SER CALC-MCNC: 3.7 G/DL (ref 1.9–3.5)
GLUCOSE SERPL-MCNC: 94 MG/DL (ref 65–99)
HCT VFR BLD AUTO: 40.1 % (ref 37–47)
HGB BLD-MCNC: 13.6 G/DL (ref 12–16)
IMM GRANULOCYTES # BLD AUTO: 0.02 K/UL (ref 0–0.11)
IMM GRANULOCYTES NFR BLD AUTO: 0.4 % (ref 0–0.9)
LYMPHOCYTES # BLD AUTO: 0.5 K/UL (ref 1–4.8)
LYMPHOCYTES NFR BLD: 10.6 % (ref 22–41)
MCH RBC QN AUTO: 28.8 PG (ref 27–33)
MCHC RBC AUTO-ENTMCNC: 33.9 G/DL (ref 33.6–35)
MCV RBC AUTO: 85 FL (ref 81.4–97.8)
MONOCYTES # BLD AUTO: 0.8 K/UL (ref 0–0.85)
MONOCYTES NFR BLD AUTO: 17 % (ref 0–13.4)
NEUTROPHILS # BLD AUTO: 3.31 K/UL (ref 2–7.15)
NEUTROPHILS NFR BLD: 70.3 % (ref 44–72)
NRBC # BLD AUTO: 0 K/UL
NRBC BLD-RTO: 0 /100 WBC
PLATELET # BLD AUTO: 284 K/UL (ref 164–446)
PMV BLD AUTO: 9.4 FL (ref 9–12.9)
POTASSIUM SERPL-SCNC: 3.5 MMOL/L (ref 3.6–5.5)
PROT SERPL-MCNC: 7.9 G/DL (ref 6–8.2)
RBC # BLD AUTO: 4.72 M/UL (ref 4.2–5.4)
SODIUM SERPL-SCNC: 137 MMOL/L (ref 135–145)
WBC # BLD AUTO: 4.7 K/UL (ref 4.8–10.8)

## 2020-03-08 PROCEDURE — 700111 HCHG RX REV CODE 636 W/ 250 OVERRIDE (IP): Performed by: INTERNAL MEDICINE

## 2020-03-08 PROCEDURE — 96366 THER/PROPH/DIAG IV INF ADDON: CPT

## 2020-03-08 PROCEDURE — 96365 THER/PROPH/DIAG IV INF INIT: CPT

## 2020-03-08 PROCEDURE — 96375 TX/PRO/DX INJ NEW DRUG ADDON: CPT

## 2020-03-08 PROCEDURE — 700102 HCHG RX REV CODE 250 W/ 637 OVERRIDE(OP): Performed by: INTERNAL MEDICINE

## 2020-03-08 PROCEDURE — 96413 CHEMO IV INFUSION 1 HR: CPT

## 2020-03-08 PROCEDURE — 85025 COMPLETE CBC W/AUTO DIFF WBC: CPT

## 2020-03-08 PROCEDURE — 96415 CHEMO IV INFUSION ADDL HR: CPT

## 2020-03-08 PROCEDURE — A9270 NON-COVERED ITEM OR SERVICE: HCPCS | Performed by: INTERNAL MEDICINE

## 2020-03-08 PROCEDURE — 700105 HCHG RX REV CODE 258: Performed by: INTERNAL MEDICINE

## 2020-03-08 PROCEDURE — 80053 COMPREHEN METABOLIC PANEL: CPT

## 2020-03-08 RX ORDER — METHYLPREDNISOLONE SODIUM SUCCINATE 125 MG/2ML
100 INJECTION, POWDER, LYOPHILIZED, FOR SOLUTION INTRAMUSCULAR; INTRAVENOUS ONCE
Status: COMPLETED | OUTPATIENT
Start: 2020-03-08 | End: 2020-03-08

## 2020-03-08 RX ORDER — ACETAMINOPHEN 500 MG
500 TABLET ORAL ONCE
Status: COMPLETED | OUTPATIENT
Start: 2020-03-08 | End: 2020-03-08

## 2020-03-08 RX ORDER — DIPHENHYDRAMINE HCL 25 MG
25 TABLET ORAL ONCE
Status: COMPLETED | OUTPATIENT
Start: 2020-03-08 | End: 2020-03-08

## 2020-03-08 RX ADMIN — ACETAMINOPHEN 500 MG: 500 TABLET, FILM COATED ORAL at 11:07

## 2020-03-08 RX ADMIN — METHYLPREDNISOLONE SODIUM SUCCINATE 100 MG: 125 INJECTION, POWDER, FOR SOLUTION INTRAMUSCULAR; INTRAVENOUS at 11:08

## 2020-03-08 RX ADMIN — DIPHENHYDRAMINE HCL 25 MG: 25 TABLET ORAL at 11:07

## 2020-03-08 RX ADMIN — RITUXIMAB 1000 MG: 10 INJECTION, SOLUTION INTRAVENOUS at 11:29

## 2020-03-08 ASSESSMENT — FIBROSIS 4 INDEX: FIB4 SCORE: 0.37

## 2020-03-08 NOTE — PROGRESS NOTES
Chemotherapy Verification - PRIMARY RN      Height = 1.575m  Weight = 61.1kg  BSA = 1.63m2       Medication: Rituximab  Dose: flat dose  Calculated Dose: 1000mg                             (In mg/m2, AUC, mg/kg)       I confirm this process was performed independently with the BSA and all final chemotherapy dosing calculations congruent.  Any discrepancies of 10% or greater have been addressed with the chemotherapy pharmacist. The resolution of the discrepancy has been documented in the EPIC progress notes.

## 2020-03-08 NOTE — PROGRESS NOTES
Chemotherapy Verification - SECONDARY RN       Height = 62.01in  Weight = 61.1kg  BSA = 1.63m2       Medication: Rituximab  Dose: 1000mg Calculated Dose: 1000mg SET DOSE                             (In mg/m2, AUC, mg/kg)       I confirm that this process was performed independently.

## 2020-03-08 NOTE — PROGRESS NOTES
Patient to \[facility]\"" for Rituxan infusion. PIV inserted into right upper arm, flushed with + blood return, labs drawn and flushed again. Premeds given. Rituxan started out at 25ml/hr for 45min. Increased to 50ml/hr for 30min. Increased to 75ml/hr for 30 min. Increased to 100ml/hr for remainder of infusion. Patient had no s/s of infusion reaction. Patient does not need future appt. Patient to home in care of self.

## 2020-08-31 ENCOUNTER — NURSE TRIAGE (OUTPATIENT)
Dept: HEALTH INFORMATION MANAGEMENT | Facility: OTHER | Age: 20
End: 2020-08-31

## 2020-08-31 ENCOUNTER — OFFICE VISIT (OUTPATIENT)
Dept: URGENT CARE | Facility: CLINIC | Age: 20
End: 2020-08-31
Payer: MEDICAID

## 2020-08-31 ENCOUNTER — DOCUMENTATION (OUTPATIENT)
Dept: SCHEDULING | Facility: IMAGING CENTER | Age: 20
End: 2020-08-31

## 2020-08-31 VITALS
TEMPERATURE: 97.7 F | RESPIRATION RATE: 16 BRPM | DIASTOLIC BLOOD PRESSURE: 72 MMHG | SYSTOLIC BLOOD PRESSURE: 106 MMHG | WEIGHT: 131 LBS | BODY MASS INDEX: 24.73 KG/M2 | HEIGHT: 61 IN | OXYGEN SATURATION: 94 % | HEART RATE: 83 BPM

## 2020-08-31 DIAGNOSIS — J06.9 VIRAL URI: ICD-10-CM

## 2020-08-31 DIAGNOSIS — J02.9 PHARYNGITIS, UNSPECIFIED ETIOLOGY: ICD-10-CM

## 2020-08-31 LAB
INT CON NEG: NEGATIVE
INT CON POS: POSITIVE
S PYO AG THROAT QL: NEGATIVE

## 2020-08-31 PROCEDURE — 87880 STREP A ASSAY W/OPTIC: CPT | Performed by: NURSE PRACTITIONER

## 2020-08-31 PROCEDURE — 99203 OFFICE O/P NEW LOW 30 MIN: CPT | Performed by: NURSE PRACTITIONER

## 2020-08-31 ASSESSMENT — ENCOUNTER SYMPTOMS
DIZZINESS: 0
FEVER: 0
VOMITING: 0
STRIDOR: 0
EYE REDNESS: 0
SORE THROAT: 1
SHORTNESS OF BREATH: 0
TROUBLE SWALLOWING: 0
COUGH: 0
HEADACHES: 0
NAUSEA: 0
SWOLLEN GLANDS: 1
MYALGIAS: 0
CHILLS: 0

## 2020-08-31 ASSESSMENT — FIBROSIS 4 INDEX: FIB4 SCORE: 0.31

## 2020-08-31 NOTE — TELEPHONE ENCOUNTER
Regarding: LUMP/THROAT  ----- Message from Stephany Renee sent at 8/31/2020  1:14 PM PDT -----  PATIENT HAS LUMP IN THROAT/SWOLLEN/HARD TO BREATH AT NIGHT/DECLINED TRANSFER TO [facility]

## 2020-08-31 NOTE — TELEPHONE ENCOUNTER
"1. Caller Name: Estelle                 Call Back Number: [phone]  [facility] PCP or Specialty Provider: Yes Norma Aguero        2.  In the last two weeks, has the patient had any new or worsening symptoms (not explained by alternative diagnosis)? Yes, the patient reports the following COVID-19 consistent symptoms: congestion or runny nose.    3.  Does patient have any comoribidities? Immunosuppressed Lupus      4.  Has the patient traveled i the last 14 days OR had any known contact with someone who is suspected or confirmed to have COVID-19?  No.    5. Disposition: Advised to perform self care, monitor for worsening symptoms and to call back in 3 days if no improvement     Patient would like to go to .  She stated she has to ask her parents first.  She will call back to schedule an appt.      Note routed to [facility] Provider: RADHA only.      Patient is reporting a lump in her throat feeling for the last couple of days.  At first felt like a sore throat.  Feels like food is getting stuck in the back of her throat.  Feels like a \"stuffy nose\" .  Mom gave pt OTC robitussin.  Patient is reporting that it \"Feels difficult to breath when she lays down at night due to stuffy nose\".    Suggested OTC nasal decongestants.    Reason for Disposition  • [1] Sinus congestion as part of a cold AND [2] present < 10 days    Additional Information  • Negative: Severe difficulty breathing (e.g., struggling for each breath, speaks in single words)  • Negative: Sounds like a life-threatening emergency to the triager  • Negative: [1] Sinus infection AND [2] taking an antibiotic AND [3] symptoms continue  • Negative: [1] Difficulty breathing AND [2] not from stuffy nose (e.g., not relieved by cleaning out the nose)  • Negative: [1] SEVERE headache AND [2] fever  • Negative: [1] Redness or swelling on the cheek, forehead or around the eye AND [2] fever  • Negative: Fever > 104 F (40 C)  • Negative: Patient sounds very sick or weak to " "the triager  • Negative: [1] SEVERE pain AND [2] not improved 2 hours after pain medicine  • Negative: [1] Redness or swelling on the cheek, forehead or around the eye AND [2] no fever  • Negative: [1] Fever > 101 F (38.3 C) AND [2] age > 60  • Negative: [1] Fever > 100.0 F (37.8 C) AND [2] bedridden (e.g., nursing home patient, CVA, chronic illness, recovering from surgery)  • Negative: [1] Fever > 100.0 F (37.8 C) AND [2] diabetes mellitus or weak immune system (e.g., HIV positive, cancer chemo, splenectomy, organ transplant, chronic steroids)  • Negative: Fever present > 3 days (72 hours)  • Negative: [1] Fever returns after gone for over 24 hours AND [2] symptoms worse or not improved  • Negative: [1] Sinus pain (not just congestion) AND [2] fever  • Negative: Earache  • Negative: [1] Sinus congestion (pressure, fullness) AND [2] present > 10 days  • Negative: [1] Nasal discharge AND [2] present > 10 days  • Negative: [1] Using nasal washes and pain medicine > 24 hours AND [2] sinus pain (around cheekbone or eye) persists  • Negative: Lots of coughing    Answer Assessment - Initial Assessment Questions  1. LOCATION: \"Where does it hurt?\"       No pain  2. ONSET: \"When did the sinus pain start?\"  (e.g., hours, days)        A couple days ago  3. SEVERITY: \"How bad is the pain?\"   (Scale 1-10; mild, moderate or severe)    - MILD (1-3): doesn't interfere with normal activities     - MODERATE (4-7): interferes with normal activities (e.g., work or school) or awakens from sleep    - SEVERE (8-10): excruciating pain and patient unable to do any normal activities         moderated  4. RECURRENT SYMPTOM: \"Have you ever had sinus problems before?\" If so, ask: \"When was the last time?\" and \"What happened that time?\"       no  5. NASAL CONGESTION: \"Is the nose blocked?\" If so, ask, \"Can you open it or must you breathe through the mouth?\"      Its hard to breath when laying down at night  6. NASAL DISCHARGE: \"Do you have " "discharge from your nose?\" If so ask, \"What color?\"      no  7. FEVER: \"Do you have a fever?\" If so, ask: \"What is it, how was it measured, and when did it start?\"       no  8. OTHER SYMPTOMS: \"Do you have any other symptoms?\" (e.g., sore throat, cough, earache, difficulty breathing)      Lump feeling in throat  9. PREGNANCY: \"Is there any chance you are pregnant?\" \"When was your last menstrual period?\"      unknown    Protocols used: SINUS PAIN OR CONGESTION-A-AH      "

## 2020-08-31 NOTE — TELEPHONE ENCOUNTER
1. Caller Name: Estelle Pappas                   Call Back Number: [phone] (home) [phone] (work)  [facility] PCP or Specialty Provider: Maximilian Aguero        2.  In the last two weeks, has the patient had any new or worsening symptoms (not explained by alternative diagnosis)? Yes, the patient reports the following COVID-19 consistent symptoms: congestion or runny nose.    3.  Does patient have any comoribidities? Immunosuppressed Prednisone and hydroxychloroquine for lupus    4.  Has the patient traveled in the last 14 days OR had any known contact with someone who is suspected or confirmed to have COVID-19?  No.    5. Disposition: Advised to go to     Note routed to [facility] Provider: RADHA only.     Reason for Disposition  • Patient wants to be seen    Additional Information  • Negative: SEVERE difficulty breathing (e.g., struggling for each breath, speaks in single words)  • Negative: Sounds like a life-threatening emergency to the triager  • Negative: Throat culture results, call about  • Negative: Productive cough is the main symptom  • Negative: Runny nose is the main symptom  • Negative: Drooling or spitting out saliva (because can't swallow)  • Negative: Unable to open mouth completely  • Negative: Drinking very little and has signs of dehydration (e.g., no urine > 12 hours, very dry mouth, very lightheaded)  • Negative: Patient sounds very sick or weak to the triager  • Negative: Difficulty breathing (per caller) but not severe  • Negative: Fever > 103 F (39.4 C)  • Negative: Refuses to drink anything for > 12 hours  • Negative: SEVERE sore throat pain  • Negative: Pus on tonsils (back of throat) and swollen neck lymph nodes ('glands')  • Negative: Earache also present  • Negative: Widespread rash (especially chest and abdomen)  • Negative: Diabetes mellitus or weak immune system (e.g., HIV positive, cancer chemo, splenectomy, organ transplant, chronic steroids)  • Negative: History of rheumatic  "fever    Answer Assessment - Initial Assessment Questions  1. ONSET: \"When did the throat start hurting?\" (Hours or days ago)       Noticed \"lump\" in throat 3-4 days ago. Throat not really sore  2. SEVERITY: \"How bad is the sore throat?\" (Scale 1-10; mild, moderate or severe)    - MILD (1-3):  doesn't interfere with eating or normal activities    - MODERATE (4-7): interferes with eating some solids and normal activities    - SEVERE (8-10):  excruciating pain, interferes with most normal activities    - SEVERE DYSPHAGIA: can't swallow liquids, drooling      mild  3. STREP EXPOSURE: \"Has there been any exposure to strep within the past week?\" If so, ask: \"What type of contact occurred?\"       no  4.  VIRAL SYMPTOMS: \"Are there any symptoms of a cold, such as a runny nose, cough, hoarse voice or red eyes?\"       Yes, nasal congestion  5. FEVER: \"Do you have a fever?\" If so, ask: \"What is your temperature, how was it measured, and when did it start?\"      no  6. PUS ON THE TONSILS: \"Is there pus on the tonsils in the back of your throat?\"      no  7. OTHER SYMPTOMS: \"Do you have any other symptoms?\" (e.g., difficulty breathing, headache, rash)      Feels like food has trouble going down, but thinks it is  8. PREGNANCY: \"Is there any chance you are pregnant?\" \"When was your last menstrual period?\"      No LMP 8/1    Protocols used: SORE THROAT-A-OH      Pt c/o congestion and a \"lump\" in throat for 3-4 days. States it feels like there is problem eating and drinking, but she is able to take fluids and food. Denies any precipitating event. No other symptoms other than some nasal congestion. Appt unavailable until end of week with PCP. Will go to [facility].    "

## 2020-09-01 NOTE — PROGRESS NOTES
Subjective:     Estelle Pappas  is a 20 y.o. female who presents for Adenopathy (bath of thraot swollen lmph nodes x 4 days)       Pharyngitis   This is a new problem. Episode onset: 4 days. The problem has been unchanged. Neither side of throat is experiencing more pain than the other. There has been no fever. The pain is at a severity of 3/10. The pain is mild. Associated symptoms include swollen glands. Pertinent negatives include no congestion, coughing, ear discharge, headaches, shortness of breath, stridor, trouble swallowing or vomiting. She has had no exposure to strep or mono. She has tried gargles for the symptoms. The treatment provided no relief.     Past Medical History:   Diagnosis Date   • Alopecia    • Anemia    • Mixed connective tissue disease (HCC)    • Raynaud phenomenon    • SLE (systemic lupus erythematosus) ([facility])    • TTP (thrombotic thrombocytopenic purpura) ([facility])    • TTP (thrombotic thrombocytopenic purpura) ([facility])    • Vitiligo      Past Surgical History:   Procedure Laterality Date   • CATH PLACEMENT      5 days right jugular for plasma pharesis     Social History     Socioeconomic History   • Marital status: Single     Spouse name: Not on file   • Number of children: Not on file   • Years of education: Not on file   • Highest education level: Not on file   Occupational History   • Not on file   Social Needs   • Financial resource strain: Not on file   • Food insecurity     Worry: Not on file     Inability: Not on file   • Transportation needs     Medical: Not on file     Non-medical: Not on file   Tobacco Use   • Smoking status: Never Smoker   • Smokeless tobacco: Never Used   Substance and Sexual Activity   • Alcohol use: No   • Drug use: No   • Sexual activity: Not Currently     Birth control/protection: Abstinence   Lifestyle   • Physical activity     Days per week: Not on file     Minutes per session: Not on file   • Stress: Not on file   Relationships   • Social connections      Talks on phone: Not on file     Gets together: Not on file     Attends Confucianism service: Not on file     Active member of club or organization: Not on file     Attends meetings of clubs or organizations: Not on file     Relationship status: Not on file   • Intimate partner violence     Fear of current or ex partner: Not on file     Emotionally abused: Not on file     Physically abused: Not on file     Forced sexual activity: Not on file   Other Topics Concern   • Behavioral problems Not Asked   • Interpersonal relationships Not Asked   • Sad or not enjoying activities Not Asked   • Suicidal thoughts Not Asked   • Poor school performance Not Asked   • Reading difficulties Not Asked   • Speech difficulties Not Asked   • Writing difficulties Not Asked   • Inadequate sleep Not Asked   • Excessive TV viewing Not Asked   • Excessive video game use Not Asked   • Inadequate exercise Not Asked   • Sports related Not Asked   • Poor diet Not Asked   • Family concerns for drug/alcohol abuse Not Asked   • Poor oral hygiene Not Asked   • Bike safety Not Asked   • Family concerns vehicle safety Not Asked   Social History Narrative   • Not on file      Family History   Problem Relation Age of Onset   • Hyperlipidemia Father    • Cancer Neg Hx    • Diabetes Neg Hx    • Heart Disease Neg Hx    • Stroke Neg Hx     Review of Systems   Constitutional: Negative for chills and fever.   HENT: Positive for sore throat. Negative for congestion, ear discharge and trouble swallowing.    Eyes: Negative for redness.   Respiratory: Negative for cough, shortness of breath and stridor.    Cardiovascular: Negative for chest pain.   Gastrointestinal: Negative for nausea and vomiting.   Genitourinary: Negative for dysuria.   Musculoskeletal: Negative for myalgias.   Skin: Negative for rash.   Neurological: Negative for dizziness and headaches.     Allergies   Allergen Reactions   • Amoxicillin Hives, Itching and Swelling     Patient stated       "Objective:   /72 (BP Location: Left arm, Patient Position: Sitting, BP Cuff Size: Adult)   Pulse 83   Temp 36.5 °C (97.7 °F) (Temporal)   Resp 16   Ht 1.549 m (5' 1\")   Wt 59.4 kg (131 lb)   LMP 08/01/2020   SpO2 94%   Breastfeeding No   BMI 24.75 kg/m²   Physical Exam  Vitals signs and nursing note reviewed.   Constitutional:       General: She is not in acute distress.     Appearance: She is well-developed.   HENT:      Head: Normocephalic and atraumatic.      Right Ear: Tympanic membrane and external ear normal.      Left Ear: Tympanic membrane and external ear normal.      Nose: Nose normal.      Right Sinus: No maxillary sinus tenderness or frontal sinus tenderness.      Left Sinus: No maxillary sinus tenderness or frontal sinus tenderness.      Mouth/Throat:      Mouth: Mucous membranes are moist.      Pharynx: Uvula midline. No posterior oropharyngeal erythema.      Tonsils: No tonsillar exudate or tonsillar abscesses.   Eyes:      General:         Right eye: No discharge.         Left eye: No discharge.      Conjunctiva/sclera: Conjunctivae normal.   Cardiovascular:      Rate and Rhythm: Normal rate.   Pulmonary:      Effort: Pulmonary effort is normal. No respiratory distress.      Breath sounds: Normal breath sounds.   Abdominal:      General: There is no distension.   Musculoskeletal: Normal range of motion.   Lymphadenopathy:      Cervical: No cervical adenopathy.   Skin:     General: Skin is warm and dry.   Neurological:      General: No focal deficit present.      Mental Status: She is alert and oriented to person, place, and time. Mental status is at baseline.      Gait: Gait (gait at baseline) normal.   Psychiatric:         Judgment: Judgment normal.           Assessment/Plan:   Assessment    1. Pharyngitis, unspecified etiology  POCT Rapid Strep A   2. Viral URI       Strep negative    · Patient is a well-appearing 20-year-old present with the stated above, strep negative, vital signs " stable, without fever do suspect possible COVID-19 however patient has been directed to the health department for testing for quicker turnaround time of results due to insurance reasoning's.  ·  Provided patient with self-isolation instructions   · Instructed to follow-up with health department  · Provided ER precautions including worsening shortness of breath and high fever  · Stressed the seriousness of isolation and prevention of transmissible disease  · Instructed to take 1000 mg of Tylenol 3 times per day  Differential diagnosis, natural history, supportive care, and indications for immediate follow-up discussed.

## 2020-10-22 ENCOUNTER — HOSPITAL ENCOUNTER (OUTPATIENT)
Dept: CARDIOLOGY | Facility: MEDICAL CENTER | Age: 20
End: 2020-10-22
Attending: INTERNAL MEDICINE
Payer: MEDICAID

## 2020-10-22 DIAGNOSIS — I73.00 RAYNAUD'S PHENOMENON WITHOUT GANGRENE: ICD-10-CM

## 2020-10-22 DIAGNOSIS — M35.1 MCTD (MIXED CONNECTIVE TISSUE DISEASE) (HCC): ICD-10-CM

## 2020-10-22 DIAGNOSIS — M32.9 SYSTEMIC LUPUS ERYTHEMATOSUS, UNSPECIFIED SLE TYPE, UNSPECIFIED ORGAN INVOLVEMENT STATUS (HCC): ICD-10-CM

## 2020-10-22 LAB
LV EJECT FRACT  99904: 65
LV EJECT FRACT MOD 2C 99903: 64.87
LV EJECT FRACT MOD 4C 99902: 64.61
LV EJECT FRACT MOD BP 99901: 65.11

## 2020-10-22 PROCEDURE — 93306 TTE W/DOPPLER COMPLETE: CPT | Mod: 26 | Performed by: INTERNAL MEDICINE

## 2020-10-22 PROCEDURE — 93306 TTE W/DOPPLER COMPLETE: CPT

## 2021-01-13 ENCOUNTER — OFFICE VISIT (OUTPATIENT)
Dept: MEDICAL GROUP | Facility: MEDICAL CENTER | Age: 21
End: 2021-01-13
Attending: INTERNAL MEDICINE
Payer: MEDICAID

## 2021-01-13 ENCOUNTER — HOSPITAL ENCOUNTER (OUTPATIENT)
Facility: MEDICAL CENTER | Age: 21
End: 2021-01-13
Attending: INTERNAL MEDICINE
Payer: MEDICAID

## 2021-01-13 VITALS
OXYGEN SATURATION: 94 % | DIASTOLIC BLOOD PRESSURE: 70 MMHG | WEIGHT: 127 LBS | HEIGHT: 61 IN | RESPIRATION RATE: 16 BRPM | SYSTOLIC BLOOD PRESSURE: 110 MMHG | TEMPERATURE: 97.1 F | HEART RATE: 80 BPM | BODY MASS INDEX: 23.98 KG/M2

## 2021-01-13 DIAGNOSIS — N89.8 VAGINAL DISCHARGE: ICD-10-CM

## 2021-01-13 PROBLEM — B02.9 SHINGLES: Status: RESOLVED | Noted: 2019-07-12 | Resolved: 2021-01-13

## 2021-01-13 LAB
FORWARD REASON: SPWHY: NORMAL
FORWARDED TO LAB: SPWHR: NORMAL
SPECIMEN SENT (2ND): SPWT2: NORMAL
SPECIMEN SENT (3RD): SPWT3: NORMAL
SPECIMEN SENT (4TH): SPWT4: NORMAL
SPECIMEN SENT: SPWT1: NORMAL

## 2021-01-13 PROCEDURE — 99213 OFFICE O/P EST LOW 20 MIN: CPT | Performed by: INTERNAL MEDICINE

## 2021-01-13 ASSESSMENT — FIBROSIS 4 INDEX: FIB4 SCORE: 0.31

## 2021-01-14 NOTE — PROGRESS NOTES
Subjective:   Estelle Pappas is a 20 y.o. female here today for vaginal discharge    Vaginal discharge  She reports intermittent vaginal discharge for the past several weeks.  When it does happen, she states that it is fishy smelling.  It is not every day.  She is not sexually active and has never been in the past.  Her LMP was 12/15/2020 and she has not started her next cycle yet.  She denies pelvic pain, fevers and chills.         Current medicines (including changes today)  Current Outpatient Medications   Medication Sig Dispense Refill   • prednisONE (DELTASONE) 2.5 MG Tab Take 1 Tab by mouth every day. 90 Tab 1   • hydroxychloroquine (PLAQUENIL) 200 MG Tab 400mg every Monday, Wednesday, Friday 200mg every other day 135 Tab 3   • acetaminophen (TYLENOL) 325 MG Tab Take 650 mg by mouth every four hours as needed for Mild Pain.       No current facility-administered medications for this visit.      She  has a past medical history of Alopecia, Anemia, Mixed connective tissue disease ([facility]), Raynaud phenomenon, SLE (systemic lupus erythematosus) ([facility]), TTP (thrombotic thrombocytopenic purpura) ([facility]), TTP (thrombotic thrombocytopenic purpura) ([facility]), and Vitiligo.    ROS   Denies chest pain, shortness of breath  As above in HPI     Objective:     Vitals:    01/13/21 1654   BP: 110/70   Pulse: 80   Resp: 16   Temp: 36.2 °C (97.1 °F)   SpO2: 94%     Body mass index is 24.01 kg/m².   Physical Exam:  Constitutional: Alert, no distress.  Skin: Warm, dry, good turgor, no rashes in visible areas.  Eye: Equal, round and reactive, conjunctiva clear, lids normal.  Psych: Alert and oriented x3, normal affect and mood.      Assessment and Plan:   The following treatment plan was discussed    1. Vaginal discharge  Given she is not sexually active and has not been in the past, no need to test for gonorrhea and chlamydia.  Suspect she has bacterial vaginosis versus physiologic discharge. Vaginal pathogen testing was collected  by patient in clinic today and we will treat as appropriate.  - VAGINAL PATHOGENS DNA PANEL; Future        Followup: Return if symptoms worsen or fail to improve.

## 2021-01-14 NOTE — ASSESSMENT & PLAN NOTE
She reports intermittent vaginal discharge for the past several weeks.  When it does happen, she states that it is fishy smelling.  It is not every day.  She is not sexually active and has never been in the past.  Her LMP was 12/15/2020 and she has not started her next cycle yet.  She denies pelvic pain, fevers and chills.

## 2021-01-19 DIAGNOSIS — N76.0 BACTERIAL VAGINOSIS: ICD-10-CM

## 2021-01-19 DIAGNOSIS — B96.89 BACTERIAL VAGINOSIS: ICD-10-CM

## 2021-01-19 DIAGNOSIS — B37.31 VAGINAL CANDIDIASIS: ICD-10-CM

## 2021-01-19 RX ORDER — METRONIDAZOLE 500 MG/1
500 TABLET ORAL 2 TIMES DAILY
Qty: 14 TAB | Refills: 0 | Status: SHIPPED | OUTPATIENT
Start: 2021-01-19 | End: 2021-01-26

## 2021-01-19 RX ORDER — FLUCONAZOLE 150 MG/1
150 TABLET ORAL DAILY
Qty: 1 TAB | Refills: 0 | Status: SHIPPED | OUTPATIENT
Start: 2021-01-19 | End: 2021-07-15

## 2021-04-14 ENCOUNTER — TELEPHONE (OUTPATIENT)
Dept: PEDIATRIC HEMATOLOGY/ONCOLOGY | Facility: OUTPATIENT CENTER | Age: 21
End: 2021-04-14

## 2021-04-14 ENCOUNTER — IMMUNIZATION (OUTPATIENT)
Dept: FAMILY PLANNING/WOMEN'S HEALTH CLINIC | Facility: IMMUNIZATION CENTER | Age: 21
End: 2021-04-14
Payer: MEDICAID

## 2021-04-14 DIAGNOSIS — Z23 ENCOUNTER FOR VACCINATION: Primary | ICD-10-CM

## 2021-04-14 PROCEDURE — 91300 PFIZER SARS-COV-2 VACCINE: CPT

## 2021-04-14 PROCEDURE — 0001A PFIZER SARS-COV-2 VACCINE: CPT

## 2021-04-14 NOTE — TELEPHONE ENCOUNTER
Call from Estelle's father who states that she has an appointment to get her covid vaccine today at 1600. However, eklsea states that Estelle is very nervous about it. Both kelsea and Estelle are reaching out to Dr. Gunn with the hope that he can help alleviate some of these fears, especially given Estelle's PMH.    Will route over to the MD.

## 2021-05-06 ENCOUNTER — IMMUNIZATION (OUTPATIENT)
Dept: FAMILY PLANNING/WOMEN'S HEALTH CLINIC | Facility: IMMUNIZATION CENTER | Age: 21
End: 2021-05-06
Attending: INTERNAL MEDICINE
Payer: MEDICAID

## 2021-05-06 DIAGNOSIS — Z23 ENCOUNTER FOR VACCINATION: Primary | ICD-10-CM

## 2021-05-06 PROCEDURE — 91300 PFIZER SARS-COV-2 VACCINE: CPT | Performed by: INTERNAL MEDICINE

## 2021-05-06 PROCEDURE — 0002A PFIZER SARS-COV-2 VACCINE: CPT | Performed by: INTERNAL MEDICINE

## 2021-07-15 ENCOUNTER — OFFICE VISIT (OUTPATIENT)
Dept: MEDICAL GROUP | Facility: MEDICAL CENTER | Age: 21
End: 2021-07-15
Attending: INTERNAL MEDICINE
Payer: MEDICAID

## 2021-07-15 VITALS
SYSTOLIC BLOOD PRESSURE: 100 MMHG | HEIGHT: 61 IN | DIASTOLIC BLOOD PRESSURE: 68 MMHG | HEART RATE: 72 BPM | WEIGHT: 116 LBS | BODY MASS INDEX: 21.9 KG/M2 | TEMPERATURE: 97.1 F | OXYGEN SATURATION: 99 % | RESPIRATION RATE: 16 BRPM

## 2021-07-15 DIAGNOSIS — M67.432 GANGLION CYST OF DORSUM OF LEFT WRIST: ICD-10-CM

## 2021-07-15 PROBLEM — D64.9 ANEMIA: Status: RESOLVED | Noted: 2019-09-09 | Resolved: 2021-07-15

## 2021-07-15 PROBLEM — Z09 HOSPITAL DISCHARGE FOLLOW-UP: Status: RESOLVED | Noted: 2019-09-08 | Resolved: 2021-07-15

## 2021-07-15 PROBLEM — N89.8 VAGINAL DISCHARGE: Status: RESOLVED | Noted: 2021-01-13 | Resolved: 2021-07-15

## 2021-07-15 PROCEDURE — 99212 OFFICE O/P EST SF 10 MIN: CPT | Performed by: INTERNAL MEDICINE

## 2021-07-15 ASSESSMENT — FIBROSIS 4 INDEX: FIB4 SCORE: 0.31

## 2021-07-15 NOTE — ASSESSMENT & PLAN NOTE
She reports swelling over the flexor aspect of the wrist on the thenar side. States that it has been present for about a week but it comes and goes and she has had it at various times during her life. It is not painful for her and it does not bother her to bend her wrist. She denies numbness, tingling, weakness in her hand, swelling in her hand, any injuries to the wrist.

## 2021-07-15 NOTE — PROGRESS NOTES
Subjective:   Estelle Pappas is a 20 y.o. female here today for lump in wrist    Ganglion cyst of dorsum of left wrist  She reports swelling over the flexor aspect of the wrist on the thenar side. States that it has been present for about a week but it comes and goes and she has had it at various times during her life. It is not painful for her and it does not bother her to bend her wrist. She denies numbness, tingling, weakness in her hand, swelling in her hand, any injuries to the wrist.       Current medicines (including changes today)  Current Outpatient Medications   Medication Sig Dispense Refill   • hydroxychloroquine (PLAQUENIL) 200 MG Tab 400mg every Monday, Wednesday, Friday 200mg every other day 135 tablet 3   • acetaminophen (TYLENOL) 325 MG Tab Take 650 mg by mouth every four hours as needed for Mild Pain.       No current facility-administered medications for this visit.     She  has a past medical history of Alopecia, Anemia, Mixed connective tissue disease (HCC), Raynaud phenomenon, SLE (systemic lupus erythematosus) ([facility]), TTP (thrombotic thrombocytopenic purpura) ([facility]), TTP (thrombotic thrombocytopenic purpura) ([facility]), and Vitiligo.    ROS   Denies chest pain, shortness of breath  As above in HPI     Objective:     Vitals:    07/15/21 1528   BP: 100/68   Pulse: 72   Resp: 16   Temp: 36.2 °C (97.1 °F)   SpO2: 99%     Body mass index is 21.92 kg/m².   Physical Exam:  Constitutional: Alert, no distress.  Skin: Warm, dry, good turgor, no rashes in visible areas.  Eye: Equal, round and reactive, conjunctiva clear, lids normal.  Extrem: approx 1 cm soft mobile mass over flexor aspect of left wrist on thenar side which is non tender, consistent with ganglion cyst      Assessment and Plan:   The following treatment plan was discussed    1. Ganglion cyst of dorsum of left wrist  Discussed benign process.  Given no pain and no limitation with movement, discussed we can continue to monitor without  intervention.  If it becomes painful in the future or carries motion, discussed that we can get her referred to orthopedics for removal.  She will notify us if this is the case.  Information about ganglion cyst given in after visit summary so that she can share this with her mom        Followup: Return if symptoms worsen or fail to improve.

## 2021-08-27 ENCOUNTER — HOSPITAL ENCOUNTER (OUTPATIENT)
Facility: MEDICAL CENTER | Age: 21
End: 2021-08-27
Attending: INTERNAL MEDICINE
Payer: MEDICAID

## 2021-08-27 ENCOUNTER — NON-PROVIDER VISIT (OUTPATIENT)
Dept: PEDIATRIC HEMATOLOGY/ONCOLOGY | Facility: OUTPATIENT CENTER | Age: 21
End: 2021-08-27
Payer: MEDICAID

## 2021-08-27 DIAGNOSIS — M32.9 SYSTEMIC LUPUS ERYTHEMATOSUS, UNSPECIFIED SLE TYPE, UNSPECIFIED ORGAN INVOLVEMENT STATUS (HCC): ICD-10-CM

## 2021-08-27 DIAGNOSIS — Z86.2 HISTORY OF TTP (THROMBOTIC THROMBOCYTOPENIC PURPURA): ICD-10-CM

## 2021-08-27 DIAGNOSIS — M35.1 MCTD (MIXED CONNECTIVE TISSUE DISEASE) (HCC): ICD-10-CM

## 2021-08-27 DIAGNOSIS — Z79.899 HIGH RISK MEDICATION USE: ICD-10-CM

## 2021-08-27 DIAGNOSIS — R82.90 ABNORMAL URINALYSIS: ICD-10-CM

## 2021-08-27 LAB
ALBUMIN SERPL BCP-MCNC: 5 G/DL (ref 3.2–4.9)
ALBUMIN/GLOB SERPL: 1.4 G/DL
ALP SERPL-CCNC: 90 U/L (ref 30–99)
ALT SERPL-CCNC: 18 U/L (ref 2–50)
ANION GAP SERPL CALC-SCNC: 20 MMOL/L (ref 7–16)
APPEARANCE UR: ABNORMAL
AST SERPL-CCNC: 24 U/L (ref 12–45)
BACTERIA #/AREA URNS HPF: ABNORMAL /HPF
BASOPHILS # BLD AUTO: 0.4 % (ref 0–1.8)
BASOPHILS # BLD: 0.03 K/UL (ref 0–0.12)
BILIRUB SERPL-MCNC: 0.3 MG/DL (ref 0.1–1.5)
BILIRUB UR QL STRIP.AUTO: NEGATIVE
BUN SERPL-MCNC: 10 MG/DL (ref 8–22)
C3 SERPL-MCNC: 128.1 MG/DL (ref 87–200)
C4 SERPL-MCNC: 29.9 MG/DL (ref 19–52)
CALCIUM SERPL-MCNC: 10 MG/DL (ref 8.5–10.5)
CHLORIDE SERPL-SCNC: 100 MMOL/L (ref 96–112)
CO2 SERPL-SCNC: 17 MMOL/L (ref 20–33)
COLOR UR: ABNORMAL
CREAT SERPL-MCNC: 0.64 MG/DL (ref 0.5–1.4)
CREAT UR-MCNC: 51.59 MG/DL
CRP SERPL HS-MCNC: <0.3 MG/DL (ref 0–0.75)
EOSINOPHIL # BLD AUTO: 0.02 K/UL (ref 0–0.51)
EOSINOPHIL NFR BLD: 0.2 % (ref 0–6.9)
EPI CELLS #/AREA URNS HPF: ABNORMAL /HPF
ERYTHROCYTE [DISTWIDTH] IN BLOOD BY AUTOMATED COUNT: 43.2 FL (ref 35.9–50)
ERYTHROCYTE [SEDIMENTATION RATE] IN BLOOD BY WESTERGREN METHOD: 10 MM/HOUR (ref 0–25)
GLOBULIN SER CALC-MCNC: 3.6 G/DL (ref 1.9–3.5)
GLUCOSE SERPL-MCNC: 81 MG/DL (ref 65–99)
GLUCOSE UR STRIP.AUTO-MCNC: NEGATIVE MG/DL
HCT VFR BLD AUTO: 41.6 % (ref 37–47)
HGB BLD-MCNC: 13.8 G/DL (ref 12–16)
HYALINE CASTS #/AREA URNS LPF: ABNORMAL /LPF
IMM GRANULOCYTES # BLD AUTO: 0.02 K/UL (ref 0–0.11)
IMM GRANULOCYTES NFR BLD AUTO: 0.2 % (ref 0–0.9)
KETONES UR STRIP.AUTO-MCNC: NEGATIVE MG/DL
LEUKOCYTE ESTERASE UR QL STRIP.AUTO: ABNORMAL
LYMPHOCYTES # BLD AUTO: 1.12 K/UL (ref 1–4.8)
LYMPHOCYTES NFR BLD: 13.9 % (ref 22–41)
MCH RBC QN AUTO: 27.7 PG (ref 27–33)
MCHC RBC AUTO-ENTMCNC: 33.2 G/DL (ref 33.6–35)
MCV RBC AUTO: 83.5 FL (ref 81.4–97.8)
MICRO URNS: ABNORMAL
MONOCYTES # BLD AUTO: 0.65 K/UL (ref 0–0.85)
MONOCYTES NFR BLD AUTO: 8.1 % (ref 0–13.4)
NEUTROPHILS # BLD AUTO: 6.2 K/UL (ref 2–7.15)
NEUTROPHILS NFR BLD: 77.2 % (ref 44–72)
NITRITE UR QL STRIP.AUTO: NEGATIVE
NRBC # BLD AUTO: 0 K/UL
NRBC BLD-RTO: 0 /100 WBC
PH UR STRIP.AUTO: 7 [PH] (ref 5–8)
PLATELET # BLD AUTO: 284 K/UL (ref 164–446)
PMV BLD AUTO: 10.5 FL (ref 9–12.9)
POTASSIUM SERPL-SCNC: 4.1 MMOL/L (ref 3.6–5.5)
PROT SERPL-MCNC: 8.6 G/DL (ref 6–8.2)
PROT UR QL STRIP: 30 MG/DL
PROT UR-MCNC: 32 MG/DL (ref 0–15)
PROT/CREAT UR: 620 MG/G (ref 10–107)
RBC # BLD AUTO: 4.98 M/UL (ref 4.2–5.4)
RBC # URNS HPF: >150 /HPF
RBC UR QL AUTO: ABNORMAL
SODIUM SERPL-SCNC: 137 MMOL/L (ref 135–145)
SP GR UR STRIP.AUTO: 1.02
UROBILINOGEN UR STRIP.AUTO-MCNC: 0.2 MG/DL
WBC # BLD AUTO: 8 K/UL (ref 4.8–10.8)
WBC #/AREA URNS HPF: ABNORMAL /HPF

## 2021-08-27 PROCEDURE — 82570 ASSAY OF URINE CREATININE: CPT

## 2021-08-27 PROCEDURE — 85652 RBC SED RATE AUTOMATED: CPT

## 2021-08-27 PROCEDURE — 86140 C-REACTIVE PROTEIN: CPT

## 2021-08-27 PROCEDURE — 36415 COLL VENOUS BLD VENIPUNCTURE: CPT | Performed by: PEDIATRICS

## 2021-08-27 PROCEDURE — 80053 COMPREHEN METABOLIC PANEL: CPT

## 2021-08-27 PROCEDURE — 85025 COMPLETE CBC W/AUTO DIFF WBC: CPT

## 2021-08-27 PROCEDURE — 99999 PR NO CHARGE: CPT | Performed by: PEDIATRICS

## 2021-08-27 PROCEDURE — 81001 URINALYSIS AUTO W/SCOPE: CPT

## 2021-08-27 PROCEDURE — 86160 COMPLEMENT ANTIGEN: CPT

## 2021-08-27 PROCEDURE — 84156 ASSAY OF PROTEIN URINE: CPT

## 2021-08-31 NOTE — NON-PROVIDER
Lab orders received from Dr. Gunn.     Labs drawn from left AC via venipuncture, x1 attempt.      Pt's unaccompanied at bedside; comfort measures and distraction provided; pt tolerated well. Gauze and Band-aid applied. No active bleeding noted.     [facility].

## 2021-10-21 ENCOUNTER — OFFICE VISIT (OUTPATIENT)
Dept: MEDICAL GROUP | Facility: MEDICAL CENTER | Age: 21
End: 2021-10-21
Attending: INTERNAL MEDICINE
Payer: MEDICAID

## 2021-10-21 VITALS
BODY MASS INDEX: 20.58 KG/M2 | TEMPERATURE: 97.3 F | WEIGHT: 109 LBS | SYSTOLIC BLOOD PRESSURE: 98 MMHG | OXYGEN SATURATION: 97 % | RESPIRATION RATE: 16 BRPM | HEART RATE: 68 BPM | HEIGHT: 61 IN | DIASTOLIC BLOOD PRESSURE: 60 MMHG

## 2021-10-21 DIAGNOSIS — R11.0 NAUSEA: ICD-10-CM

## 2021-10-21 PROCEDURE — 99213 OFFICE O/P EST LOW 20 MIN: CPT | Performed by: INTERNAL MEDICINE

## 2021-10-21 RX ORDER — ONDANSETRON 4 MG/1
4 TABLET, ORALLY DISINTEGRATING ORAL EVERY 6 HOURS PRN
Qty: 10 TABLET | Refills: 0 | Status: SHIPPED | OUTPATIENT
Start: 2021-10-21 | End: 2022-12-22

## 2021-10-21 ASSESSMENT — FIBROSIS 4 INDEX: FIB4 SCORE: 0.42

## 2021-10-21 NOTE — PROGRESS NOTES
Subjective:   Estelle Pappas is a 21 y.o. female here today for nausea    Nausea  She reports sudden onset of nausea approximately 1 week ago.  She states that she is having difficulty eating because of this.  She has not had any vomiting but she gets nauseous with any type of food.  She denies fevers, chills, upper respiratory infectious symptoms.  She denies heartburn, diarrhea, constipation, melena, hematochezia.  There has been no change in her Plaquenil dose.       Current medicines (including changes today)  Current Outpatient Medications   Medication Sig Dispense Refill   • ondansetron (ZOFRAN ODT) 4 MG TABLET DISPERSIBLE Take 1 Tablet by mouth every 6 hours as needed for Nausea. 10 Tablet 0   • hydroxychloroquine (PLAQUENIL) 200 MG Tab TAKE 2 TABLETS BY MOUTH EVERY MONDAY, WEDNESDAY, FRIDAY 1 TABLET EVERY OTHER  Tablet 3   • acetaminophen (TYLENOL) 325 MG Tab Take 650 mg by mouth every four hours as needed for Mild Pain.       No current facility-administered medications for this visit.     She  has a past medical history of Alopecia, Anemia, Mixed connective tissue disease (HCC), Raynaud phenomenon, SLE (systemic lupus erythematosus) ([facility]), TTP (thrombotic thrombocytopenic purpura) ([facility]), TTP (thrombotic thrombocytopenic purpura) ([facility]), and Vitiligo.    ROS   Denies chest pain, shortness of breath  As above in HPI     Objective:     Vitals:    10/21/21 0706   BP: (!) 98/60   Pulse: 68   Resp: 16   Temp: 36.3 °C (97.3 °F)   SpO2: 97%     Body mass index is 20.6 kg/m².   Physical Exam:  Constitutional: Alert, no distress.  Skin: Warm, dry, good turgor, no rashes in visible areas.  Eye: Equal, round and reactive, conjunctiva clear, lids normal.  Respiratory: Unlabored respiratory effort, lungs clear to auscultation, no wheezes, no ronchi.  Cardiovascular: Regular rate and rhythm, no murmurs appreciated, no lower extremity edema  Abdomen: Soft, non-tender, no masses, no  hepatosplenomegaly.  Psych: Alert and oriented x3, normal affect and mood.      Assessment and Plan:   The following treatment plan was discussed    1. Nausea  Etiology unclear.  Given duration of symptoms for just 1 week, could represent a viral gastroenteritis or other self-limiting process however given her underlying lupus, she is at increased risk of peptic ulcer disease, GERD, esophageal dysmotility, and pancreatitis as well as hepatitis.  Because of this, I have added some labs and she plans to go get these as well as her rheumatologist ordered labs tomorrow.  We will start her on Zofran as needed.  We will follow-up with her in 2 weeks in case symptoms are not resolving at which time could consider empiric GERD treatment.  If she is feeling better before then she will cancel the follow-up.  - LIPASE; Future  - Comp Metabolic Panel; Future  - ondansetron (ZOFRAN ODT) 4 MG TABLET DISPERSIBLE; Take 1 Tablet by mouth every 6 hours as needed for Nausea.  Dispense: 10 Tablet; Refill: 0        Followup: Return in about 2 weeks (around 11/4/2021), or if symptoms worsen or fail to improve.

## 2021-10-21 NOTE — ASSESSMENT & PLAN NOTE
She reports sudden onset of nausea approximately 1 week ago.  She states that she is having difficulty eating because of this.  She has not had any vomiting but she gets nauseous with any type of food.  She denies fevers, chills, upper respiratory infectious symptoms.  She denies heartburn, diarrhea, constipation, melena, hematochezia.  There has been no change in her Plaquenil dose.

## 2022-08-07 ENCOUNTER — HOSPITAL ENCOUNTER (EMERGENCY)
Facility: MEDICAL CENTER | Age: 22
End: 2022-08-07
Attending: EMERGENCY MEDICINE
Payer: MEDICAID

## 2022-08-07 VITALS
HEART RATE: 85 BPM | WEIGHT: 104.94 LBS | SYSTOLIC BLOOD PRESSURE: 111 MMHG | HEIGHT: 61 IN | DIASTOLIC BLOOD PRESSURE: 69 MMHG | BODY MASS INDEX: 19.81 KG/M2 | OXYGEN SATURATION: 98 % | TEMPERATURE: 98.2 F | RESPIRATION RATE: 18 BRPM

## 2022-08-07 DIAGNOSIS — N94.9 GENITAL LESION, FEMALE: ICD-10-CM

## 2022-08-07 DIAGNOSIS — R10.2 VAGINAL PAIN: ICD-10-CM

## 2022-08-07 LAB
APPEARANCE UR: ABNORMAL
BACTERIA #/AREA URNS HPF: NEGATIVE /HPF
BILIRUB UR QL STRIP.AUTO: NEGATIVE
CANDIDA DNA VAG QL PROBE+SIG AMP: NEGATIVE
COLOR UR: YELLOW
EPI CELLS #/AREA URNS HPF: ABNORMAL /HPF
G VAGINALIS DNA VAG QL PROBE+SIG AMP: NEGATIVE
GLUCOSE UR STRIP.AUTO-MCNC: NEGATIVE MG/DL
HCG UR QL: NEGATIVE
HIV 1+2 AB+HIV1 P24 AG SERPL QL IA: NORMAL
HYALINE CASTS #/AREA URNS LPF: ABNORMAL /LPF
KETONES UR STRIP.AUTO-MCNC: 15 MG/DL
LEUKOCYTE ESTERASE UR QL STRIP.AUTO: ABNORMAL
MICRO URNS: ABNORMAL
MUCOUS THREADS #/AREA URNS HPF: ABNORMAL /HPF
NITRITE UR QL STRIP.AUTO: NEGATIVE
PH UR STRIP.AUTO: 6.5 [PH] (ref 5–8)
PROT UR QL STRIP: 100 MG/DL
RBC # URNS HPF: ABNORMAL /HPF
RBC UR QL AUTO: ABNORMAL
SP GR UR STRIP.AUTO: 1.03
T PALLIDUM AB SER QL IA: NORMAL
T VAGINALIS DNA VAG QL PROBE+SIG AMP: NEGATIVE
UROBILINOGEN UR STRIP.AUTO-MCNC: 1 MG/DL
WBC #/AREA URNS HPF: ABNORMAL /HPF

## 2022-08-07 PROCEDURE — 86780 TREPONEMA PALLIDUM: CPT

## 2022-08-07 PROCEDURE — 87529 HSV DNA AMP PROBE: CPT | Mod: 91

## 2022-08-07 PROCEDURE — 81001 URINALYSIS AUTO W/SCOPE: CPT

## 2022-08-07 PROCEDURE — A9270 NON-COVERED ITEM OR SERVICE: HCPCS | Performed by: EMERGENCY MEDICINE

## 2022-08-07 PROCEDURE — 87480 CANDIDA DNA DIR PROBE: CPT

## 2022-08-07 PROCEDURE — 87086 URINE CULTURE/COLONY COUNT: CPT

## 2022-08-07 PROCEDURE — 87491 CHLMYD TRACH DNA AMP PROBE: CPT

## 2022-08-07 PROCEDURE — 700101 HCHG RX REV CODE 250: Performed by: EMERGENCY MEDICINE

## 2022-08-07 PROCEDURE — 36415 COLL VENOUS BLD VENIPUNCTURE: CPT

## 2022-08-07 PROCEDURE — 99284 EMERGENCY DEPT VISIT MOD MDM: CPT

## 2022-08-07 PROCEDURE — 81025 URINE PREGNANCY TEST: CPT

## 2022-08-07 PROCEDURE — 87660 TRICHOMONAS VAGIN DIR PROBE: CPT

## 2022-08-07 PROCEDURE — 87510 GARDNER VAG DNA DIR PROBE: CPT

## 2022-08-07 PROCEDURE — 700102 HCHG RX REV CODE 250 W/ 637 OVERRIDE(OP): Performed by: EMERGENCY MEDICINE

## 2022-08-07 PROCEDURE — 87389 HIV-1 AG W/HIV-1&-2 AB AG IA: CPT

## 2022-08-07 PROCEDURE — 87591 N.GONORRHOEAE DNA AMP PROB: CPT

## 2022-08-07 RX ORDER — LIDOCAINE HYDROCHLORIDE 20 MG/ML
JELLY TOPICAL ONCE
Status: COMPLETED | OUTPATIENT
Start: 2022-08-07 | End: 2022-08-07

## 2022-08-07 RX ORDER — DOXYCYCLINE 100 MG/1
100 CAPSULE ORAL 2 TIMES DAILY
Qty: 14 CAPSULE | Refills: 0 | Status: SHIPPED | OUTPATIENT
Start: 2022-08-07 | End: 2022-08-14

## 2022-08-07 RX ORDER — DOXYCYCLINE 100 MG/1
100 TABLET ORAL ONCE
Status: COMPLETED | OUTPATIENT
Start: 2022-08-07 | End: 2022-08-07

## 2022-08-07 RX ORDER — VALACYCLOVIR HYDROCHLORIDE 1 G/1
1000 TABLET, FILM COATED ORAL 2 TIMES DAILY
Qty: 20 TABLET | Refills: 0 | Status: SHIPPED | OUTPATIENT
Start: 2022-08-07 | End: 2022-12-22

## 2022-08-07 RX ORDER — VALACYCLOVIR HYDROCHLORIDE 500 MG/1
1000 TABLET, FILM COATED ORAL ONCE
Status: COMPLETED | OUTPATIENT
Start: 2022-08-07 | End: 2022-08-07

## 2022-08-07 RX ADMIN — VALACYCLOVIR HYDROCHLORIDE 1000 MG: 500 TABLET, FILM COATED ORAL at 17:15

## 2022-08-07 RX ADMIN — DOXYCYCLINE 100 MG: 100 TABLET, FILM COATED ORAL at 16:30

## 2022-08-07 RX ADMIN — LIDOCAINE HYDROCHLORIDE 1 APPLICATION: 20 JELLY TOPICAL at 17:00

## 2022-08-07 ASSESSMENT — ENCOUNTER SYMPTOMS
CHILLS: 0
FEVER: 0

## 2022-08-07 ASSESSMENT — FIBROSIS 4 INDEX: FIB4 SCORE: 0.44

## 2022-08-07 NOTE — ED PROVIDER NOTES
"ED Provider Note    CHIEF COMPLAINT  Chief Complaint   Patient presents with   • Vaginal Discharge     C/o vaginal discharge describes as \"yellowish to greenish\" x few days. Got worse after monistat.     • Painful Urination       HPI  Estelle Pappas is a 22 y.o. female with a history of SLE presenting for evaluation of vaginal pain and discharge.  Patient reports that she noticed discomfort starting about 1 week ago.  She subsequently started Monistat and use this for several days but felt that symptoms were getting worse.  Over the past few days her discharge has changed to yellow and green.  She has significant pain in the area which has been worsening.  She notes that she is sexually active with 1 male partner.  They typically use protection, but did not use protection during their last encounter.  She notes that she also had used a lavender scented soap after this encounter and subsequently the pain started.  She is unsure whether she caused irritation or could have a vaginal infection.  She denies any possibility of pregnancy, and states that her last menstrual period was about 2 weeks ago.    REVIEW OF SYSTEMS  Review of Systems   Constitutional: Negative for chills and fever.   Genitourinary: Positive for dysuria.   All other systems reviewed and are negative.      PAST MEDICAL HISTORY   has a past medical history of Alopecia, Anemia, Mixed connective tissue disease (HCC), Raynaud phenomenon, SLE (systemic lupus erythematosus) (HCC), TTP (thrombotic thrombocytopenic purpura), TTP (thrombotic thrombocytopenic purpura), and Vitiligo.    SOCIAL HISTORY  Social History     Tobacco Use   • Smoking status: Never Smoker   • Smokeless tobacco: Never Used   Vaping Use   • Vaping Use: Never used   Substance and Sexual Activity   • Alcohol use: No   • Drug use: No   • Sexual activity: Not Currently     Birth control/protection: Abstinence       SURGICAL HISTORY   has a past surgical history that includes cath " "placement.    CURRENT MEDICATIONS  Home Medications     Reviewed by Alexandra Berry R.N. (Registered Nurse) on 08/07/22 at 1443  Med List Status: Partial   Medication Last Dose Status   acetaminophen (TYLENOL) 325 MG Tab  Active   hydroxychloroquine (PLAQUENIL) 200 MG Tab  Active   ondansetron (ZOFRAN ODT) 4 MG TABLET DISPERSIBLE  Active                ALLERGIES  Allergies   Allergen Reactions   • Amoxicillin Hives, Itching and Swelling     Patient stated       PHYSICAL EXAM  VITAL SIGNS: /69   Pulse 90   Temp 37 °C (98.6 °F) (Tympanic)   Resp 18   Ht 1.549 m (5' 1\")   Wt 47.6 kg (104 lb 15 oz)   LMP 07/27/2022   SpO2 98%   BMI 19.83 kg/m²    Pulse ox interpretation: I interpret this pulse ox as normal.  Constitutional: Alert in no apparent distress.  HENT: Normocephalic, Atraumatic, Bilateral external ears normal. Nose normal.   Eyes: Pupils are equal and reactive. Conjunctiva normal, non-icteric.   Heart: Regular rate and rhythm  Lungs: Clear to auscultation bilaterally.  Abdomen: Soft, nontender, normal bowel sounds  Skin: Warm, Dry, No erythema, No rash.   : Unable to perform speculum exam secondary to pain.  Patient has multiple blisterlike lesions over the labia minora and intravaginally.  Some of these appear to be filled with purulent material.  Extremely tender to palpation without significant swelling.  Psychiatric: Affect normal, Judgment normal, Mood normal, Appears appropriate and not intoxicated.     Diagnostic Studies:  Results for orders placed or performed during the hospital encounter of 08/07/22   URINALYSIS,CULTURE IF INDICATED    Specimen: Urine   Result Value Ref Range    Color Yellow     Character Turbid (A)     Specific Gravity 1.030 <1.035    Ph 6.5 5.0 - 8.0    Glucose Negative Negative mg/dL    Ketones 15 (A) Negative mg/dL    Protein 100 (A) Negative mg/dL    Bilirubin Negative Negative    Urobilinogen, Urine 1.0 Negative    Nitrite Negative Negative    Leukocyte Esterase " Large (A) Negative    Occult Blood Trace (A) Negative    Micro Urine Req Microscopic    Chlamydia/GC, PCR (Urine)    Specimen: Urine   Result Value Ref Range    Source Urine    T. Pallidum AB EIA (Syphilis)   Result Value Ref Range    Syphilis, Treponemal Qual Non-Reactive Non-Reactive   HIV AG/AB Combo Assay Screening   Result Value Ref Range    HIV Ag/Ab Combo Assay Non-Reactive Non Reactive   BETA-HCG QUALITATIVE URINE   Result Value Ref Range    Beta-Hcg Urine Negative Negative   URINE MICROSCOPIC (W/UA)   Result Value Ref Range    WBC Packed (A) /hpf    RBC 2-5 (A) /hpf    Bacteria Negative None /hpf    Epithelial Cells Moderate (A) /hpf    Mucous Threads Many /hpf    Hyaline Cast 0-2 /lpf   HSV by PCR WRFLEX to HSV 1/2 Subtype   Result Value Ref Range    HSV Source Vesicle fluid    URINE CULTURE(NEW)    Specimen: Urine   Result Value Ref Range    Significant Indicator NEG     Source UR     Site -     Culture Result -        These results were personally reviewed and interpreted by me.    COURSE & MEDICAL DECISION MAKING    22-year-old female presents emergency department for evaluation of vaginal discharge and pain.  On exam, she was well-appearing with normal vital signs.  Pelvic exam shows multiple lesions present on the labia minora.  These may be consistent with a contact dermatitis, syphilis, HSV, or in this patient Behçet syndrome.    Urinalysis shows packed white blood cells which may be secondary to the inflammatory process seen on her labia.  Patient was empirically started on doxycycline with concern for STI.  She did not wish to receive IM ceftriaxone at this time, and feel it is reasonable to wait for results. These lesions may be secondary to HSV which was ordered and is pending at this time.  I will empirically start patient on valacyclovir and advised close follow-up with her regular doctor.    The patient will return for new or worsening symptoms and is stable at the time of discharge.    The  patient is referred to a primary physician for blood pressure management, diabetic screening, and for all other preventative health concerns.    DISPOSITION:  Patient will be discharged home in stable condition.    FOLLOW UP:  Norma Aguero M.D.  [address] [place] [id number]  [phone]    Schedule an appointment as soon as possible for a visit         OUTPATIENT MEDICATIONS:  New Prescriptions    DOXYCYCLINE (MONODOX) 100 MG CAPSULE    Take 1 Capsule by mouth 2 times a day for 7 days.    VALACYCLOVIR (VALTREX) 1 GM TAB    Take 1 Tablet by mouth 2 times a day.            FINAL IMPRESSION  Visit Diagnoses     ICD-10-CM   1. Genital lesion, female  N94.9   2. Vaginal pain  R10.2              Electronically signed by: Giulia Armenta M.D., 8/7/2022 3:04 PM

## 2022-08-07 NOTE — ED TRIAGE NOTES
"Chief Complaint   Patient presents with   • Vaginal Discharge     C/o vaginal discharge describes as \"yellowish to greenish\" x few days. Got worse after monistat.     • Painful Urination     States she has hx of vaginal infection. Educated on triage process. Instructed to notify staff for any worsening symptoms. Denies any recent travel. Denies exposure to known covid positive patients. Denies any respiratory symptoms.   "

## 2022-08-08 LAB
C TRACH DNA SPEC QL NAA+PROBE: NEGATIVE
N GONORRHOEA DNA SPEC QL NAA+PROBE: NEGATIVE
SPECIMEN SOURCE: NORMAL

## 2022-08-08 NOTE — ED NOTES
Pt received discharge instructions and understood all including follow up , pt ambulated to lobby with no difficulty

## 2022-08-09 LAB
BACTERIA UR CULT: NORMAL
SIGNIFICANT IND 70042: NORMAL
SITE SITE: NORMAL
SOURCE SOURCE: NORMAL

## 2022-08-10 ENCOUNTER — OFFICE VISIT (OUTPATIENT)
Dept: URGENT CARE | Facility: CLINIC | Age: 22
End: 2022-08-10
Payer: MEDICAID

## 2022-08-10 VITALS
WEIGHT: 103 LBS | TEMPERATURE: 97.5 F | HEIGHT: 61 IN | DIASTOLIC BLOOD PRESSURE: 80 MMHG | BODY MASS INDEX: 19.45 KG/M2 | RESPIRATION RATE: 16 BRPM | HEART RATE: 107 BPM | SYSTOLIC BLOOD PRESSURE: 122 MMHG | OXYGEN SATURATION: 97 %

## 2022-08-10 DIAGNOSIS — Z09 FOLLOW-UP EXAMINATION: ICD-10-CM

## 2022-08-10 DIAGNOSIS — N94.9 GENITAL LESION, FEMALE: ICD-10-CM

## 2022-08-10 PROCEDURE — 99213 OFFICE O/P EST LOW 20 MIN: CPT | Performed by: STUDENT IN AN ORGANIZED HEALTH CARE EDUCATION/TRAINING PROGRAM

## 2022-08-10 ASSESSMENT — FIBROSIS 4 INDEX: FIB4 SCORE: 0.44

## 2022-08-11 LAB
HSV DNA SPEC QL NAA+PROBE: DETECTED
SPECIMEN SOURCE: ABNORMAL

## 2022-08-11 NOTE — PROGRESS NOTES
Subjective:   Estelle Pappas is a 22 y.o. female who presents for Painful Urination (Pt is feeling better than when she was in hospital, pt coming in today because she was advised to get checked after her hospital visit.) and Medication Refill (Lidocaine 2% Jelly for numbing)      HPI:  Pleasant 22-year-old female presents to the urgent care for ER follow-up from 8/7/2022.  Patient presented to the ER for vaginal lesions and pain.  Extensive work-up was done which showed no signs of urinary tract.  STI testing was performed at that time.  Patient's physical exam and presentation was most consistent with suspected herpes simplex lesions of the genitals.  Patient was empirically treated with valacyclovir and is still currently taking this medication.  HSV swab was performed and is still pending at this time.  Patient states that she was instructed to follow-up with her primary care provider 24 to 48 hours after being discharged from the ER.  She states that she attempted to see her PCP but they are no longer accepting her insurance and stated that she would not be able to be seen which is why she presented to the urgent care.  She reports that she is feeling much better at this time and her lesions have greatly improved.  She reports that the lesions are no longer blisters and have scabbed over.  She reports improvement in her pain and has been using topical lidocaine gel which was prescribed from the ER.  She denies worsening of her symptoms, dysuria, increased urinary frequency, hematuria, flank pain, back pain, vaginal discharge, vaginal odor, dizziness, headache, blurred vision, double vision, eye redness, eye discharge, chest pain, shortness of breath, abdominal pain, nausea, vomiting, or diarrhea.        Medications:    acetaminophen Tabs  doxycycline  hydroxychloroquine Tabs  Lidocaine Gel  ondansetron Tbdp  valacyclovir Tabs    Allergies: Amoxicillin    Problem List: Estelle Pappas does not  "have any pertinent problems on file.    Surgical History:  Past Surgical History:   Procedure Laterality Date    CATH PLACEMENT      5 days right jugular for plasma pharesis       Past Social Hx: Estelle Pappas  reports that she has never smoked. She has never used smokeless tobacco. She reports that she does not drink alcohol and does not use drugs.     Past Family Hx:  Estelle Pappas family history includes Hyperlipidemia in her father.     Problem list, medications, and allergies reviewed by myself today in Epic.     Objective:     /80 (BP Location: Left arm, Patient Position: Sitting, BP Cuff Size: Small adult)   Pulse (!) 107   Temp 36.4 °C (97.5 °F) (Temporal)   Resp 16   Ht 1.549 m (5' 1\")   Wt 46.7 kg (103 lb)   LMP 07/27/2022   SpO2 97%   BMI 19.46 kg/m²     Physical Exam  Vitals reviewed.   Constitutional:       General: She is not in acute distress.     Appearance: Normal appearance.   HENT:      Head: Normocephalic.      Right Ear: Tympanic membrane, ear canal and external ear normal.      Left Ear: Tympanic membrane, ear canal and external ear normal.      Nose: Nose normal.      Mouth/Throat:      Mouth: Mucous membranes are moist.   Eyes:      Conjunctiva/sclera: Conjunctivae normal.      Pupils: Pupils are equal, round, and reactive to light.   Cardiovascular:      Rate and Rhythm: Normal rate and regular rhythm.      Pulses: Normal pulses.      Heart sounds: Normal heart sounds. No murmur heard.  Pulmonary:      Effort: Pulmonary effort is normal. No respiratory distress.      Breath sounds: Normal breath sounds. No stridor. No wheezing, rhonchi or rales.   Genitourinary:     Comments: Patient deferred  exam.  She states that she is feeling much better does not want to do this exam at this time.  Musculoskeletal:      Cervical back: Normal range of motion.   Lymphadenopathy:      Cervical: No cervical adenopathy.   Skin:     General: Skin is warm and dry.      " Capillary Refill: Capillary refill takes less than 2 seconds.      Findings: No erythema, lesion or rash.   Neurological:      General: No focal deficit present.      Mental Status: She is alert and oriented to person, place, and time.       Assessment/Plan:     Diagnosis and associated orders:     1. Genital lesion, female  Lidocaine 2 % Gel    Referral to establish with [facility] PCP      2. Follow-up examination  Referral to establish with [facility] PCP         Comments/MDM:     Patient presents for follow-up for ER visit due to inability to see her PCP because of an insurance issue.  Patient reports great improvement in her symptoms and is currently taking her antiviral medication.  Patient's medical records, ER visit, and labs were reviewed.  HSV swab is still pending at this time.  Patient advised to continue taking her valacyclovir for the entirety of the prescription.  New prescription for 2% lidocaine gel was prescribed to the patient that she may continue use for pain as she states that this has been helping greatly.  Urgent referral to new PCP was created at this visit for follow-up and management.  ED/return precautions were given.  Patient has good understanding of the signs and symptoms that warrant immediate reevaluation.         Differential diagnosis, natural history, supportive care, and indications for immediate follow-up discussed.    Advised the patient to follow-up with the primary care physician for recheck, reevaluation, and consideration of further management.    Please note that this dictation was created using voice recognition software. I have made a reasonable attempt to correct obvious errors, but I expect that there are errors of grammar and possibly content that I did not discover before finalizing the note.    Electronically signed by Mainor Beckford PA-C.

## 2022-08-13 LAB
HSV1 DNA CSF QL NAA+PROBE: DETECTED
HSV2 DNA CSF QL NAA+PROBE: NOT DETECTED
SPECIMEN SOURCE: ABNORMAL

## 2022-10-17 ENCOUNTER — OFFICE VISIT (OUTPATIENT)
Dept: MEDICAL GROUP | Facility: CLINIC | Age: 22
End: 2022-10-17
Attending: STUDENT IN AN ORGANIZED HEALTH CARE EDUCATION/TRAINING PROGRAM
Payer: MEDICAID

## 2022-10-17 DIAGNOSIS — Z23 NEED FOR VACCINATION: ICD-10-CM

## 2022-10-17 DIAGNOSIS — Z30.09 ENCOUNTER FOR COUNSELING REGARDING CONTRACEPTION: ICD-10-CM

## 2022-10-17 DIAGNOSIS — M32.9 SYSTEMIC LUPUS ERYTHEMATOSUS, UNSPECIFIED SLE TYPE, UNSPECIFIED ORGAN INVOLVEMENT STATUS (HCC): ICD-10-CM

## 2022-10-17 DIAGNOSIS — A60.04 HERPES SIMPLEX VULVOVAGINITIS: ICD-10-CM

## 2022-10-17 DIAGNOSIS — Z12.4 CERVICAL CANCER SCREENING: ICD-10-CM

## 2022-10-17 PROCEDURE — 99203 OFFICE O/P NEW LOW 30 MIN: CPT | Mod: 25,GE | Performed by: HEALTH CARE PROVIDER

## 2022-10-17 PROCEDURE — 90471 IMMUNIZATION ADMIN: CPT | Performed by: HEALTH CARE PROVIDER

## 2022-10-17 PROCEDURE — 90686 IIV4 VACC NO PRSV 0.5 ML IM: CPT | Performed by: HEALTH CARE PROVIDER

## 2022-10-17 RX ORDER — VALACYCLOVIR HYDROCHLORIDE 1 G/1
TABLET, FILM COATED ORAL
Qty: 20 TABLET | Refills: 0 | Status: SHIPPED | OUTPATIENT
Start: 2022-10-17 | End: 2022-12-22

## 2022-10-17 ASSESSMENT — FIBROSIS 4 INDEX: FIB4 SCORE: 0.44

## 2022-10-17 NOTE — ASSESSMENT & PLAN NOTE
Discussed cervical cancer screening guidelines and recommendation to perform pap. Pt will schedule separate appt for pap.

## 2022-10-17 NOTE — ASSESSMENT & PLAN NOTE
Discussed contraception options. Pt will discuss best options with her Rheumatologist and will discuss further at well woman appt.

## 2022-10-17 NOTE — PROGRESS NOTES
Subjective:     CC: Establish care    HPI:   Estelle is a 22 y.o. female with past medical history of SLE, genital herpes presents today to establish care. No acute concerns. She notes recent ER visit at which time she tested positive for genital HSV and was treated with course of Valtrex with full resolution.    She also describes 5 year history of SLE for which she has been seeing Dr. Talbot and plans to continue. No recent flares or need for steroid use.    Problem   Herpes Simplex Vulvovaginitis   Cervical Cancer Screening   Encounter for Counseling Regarding Contraception   Sle (Systemic Lupus Erythematosus) (Hcc)       Current Outpatient Medications Ordered in Epic   Medication Sig Dispense Refill    valacyclovir (VALTREX) 1 GM Tab At first sign of herpes infection, take 1 tab by mouth daily for 5 days 20 Tablet 0    hydroxychloroquine (PLAQUENIL) 200 MG Tab TAKE 2 TABLETS BY MOUTH EVERY MONDAY, WEDNESDAY, FRIDAY 1 TABLET EVERY OTHER  Tablet 1    acetaminophen (TYLENOL) 325 MG Tab Take 650 mg by mouth every four hours as needed for Mild Pain.      Lidocaine 2 % Gel Apply 1 Application topically every four hours as needed (pain). 28.33 Each 0    valacyclovir (VALTREX) 1 GM Tab Take 1 Tablet by mouth 2 times a day. 20 Tablet 0    ondansetron (ZOFRAN ODT) 4 MG TABLET DISPERSIBLE Take 1 Tablet by mouth every 6 hours as needed for Nausea. 10 Tablet 0     No current Epic-ordered facility-administered medications on file.       Health Maintenance: Completed    ROS:  Gen: no fevers/chills, no changes in weight  Eyes: no changes in vision  ENT: no sore throat, no hearing loss, no bloody nose  Pulm: no sob, no cough  CV: no chest pain, no palpitations  GI: no nausea/vomiting, no diarrhea  : no dysuria  MSk: no myalgias  Skin: no rash  Neuro: no headaches, no numbness/tingling  Heme/Lymph: no easy bruising      Objective:     Exam:  BP (P) 106/67 (BP Location: Left arm, Patient Position: Sitting, BP Cuff Size:  "Small adult)   Pulse (P) 88   Ht (P) 1.58 m (5' 2.21\")   Wt (P) 49.5 kg (109 lb 3.2 oz)   SpO2 (P) 97%   BMI (P) 19.84 kg/m²  Body mass index is 19.84 kg/m² (pended).    Gen:  Alert and oriented, No apparent distress.  HEENT: Neck is supple without lymphadenopathy, EOMI, no pharyngeal erythema or exudate  Lungs:  Normal effort, CTA bilaterally, no wheezes, rhonchi, or rales  CV:  Regular rate and rhythm. No murmurs, rubs, or gallops.  Abd:      Soft, non-tender, non-distended  Ext:  No clubbing, cyanosis, edema.      Labs: None    Assessment & Plan:     22 y.o. female with the following -     Problem List Items Addressed This Visit       SLE (systemic lupus erythematosus) (HCC)     5 year history of SLE, currently prescribed hydroxychloroquine daily as well as Rituximab every several months with rare use of steroid burst for flare. Continues to follow with Dr. Deshpande. No current issues.         Herpes simplex vulvovaginitis     Recent history of genital herpes on 8/2022, in full remission. Will provide valtrex course to take if recurrence occurs.         Relevant Medications    valacyclovir (VALTREX) 1 GM Tab    Cervical cancer screening     Discussed cervical cancer screening guidelines and recommendation to perform pap. Pt will schedule separate appt for pap.         Encounter for counseling regarding contraception     Discussed contraception options. Pt will discuss best options with her Rheumatologist and will discuss further at well woman appt.          Other Visit Diagnoses       Need for vaccination        Relevant Orders    INFLUENZA VACCINE QUAD INJ (PF)                Follow up in 1-2 mo for WWE including PAP        Kevyn Leal M.D.  [facility]  PGY-2        "

## 2022-10-17 NOTE — ASSESSMENT & PLAN NOTE
5 year history of SLE, currently prescribed hydroxychloroquine daily as well as Rituximab every several months with rare use of steroid burst for flare. Continues to follow with Dr. Deshpande. No current issues.

## 2022-10-17 NOTE — ASSESSMENT & PLAN NOTE
Recent history of genital herpes on 8/2022, in full remission. Will provide valtrex course to take if recurrence occurs.

## 2022-10-27 ENCOUNTER — APPOINTMENT (OUTPATIENT)
Dept: MEDICAL GROUP | Facility: CLINIC | Age: 22
End: 2022-10-27
Payer: MEDICAID

## 2022-11-03 ENCOUNTER — NON-PROVIDER VISIT (OUTPATIENT)
Dept: MEDICAL GROUP | Facility: CLINIC | Age: 22
End: 2022-11-03
Payer: MEDICAID

## 2022-11-03 DIAGNOSIS — Z23 ENCOUNTER FOR ADMINISTRATION OF VACCINE: Primary | ICD-10-CM

## 2022-11-03 PROCEDURE — 90472 IMMUNIZATION ADMIN EACH ADD: CPT | Performed by: FAMILY MEDICINE

## 2022-11-03 PROCEDURE — 99999 PR NO CHARGE: CPT | Performed by: FAMILY MEDICINE

## 2022-11-03 PROCEDURE — 90651 9VHPV VACCINE 2/3 DOSE IM: CPT | Performed by: FAMILY MEDICINE

## 2022-11-03 PROCEDURE — 90471 IMMUNIZATION ADMIN: CPT | Performed by: FAMILY MEDICINE

## 2022-11-03 PROCEDURE — 90715 TDAP VACCINE 7 YRS/> IM: CPT | Performed by: FAMILY MEDICINE

## 2022-11-03 NOTE — PROGRESS NOTES
"Estelle Pappas is a 22 y.o. female here for a non-provider visit for:   TDAP    Reason for immunization: continue or complete series started at the office  Immunization records indicate need for vaccine: Yes, confirmed with [facility] [employer]  Minimum interval has been met for this vaccine: Yes  ABN completed: Not Indicated    VIS Dated  8/6/21 was given to patient: Yes  All IAC Questionnaire questions were answered \"No.\"    Patient tolerated injection and no adverse effects were observed or reported: Yes    Pt scheduled for next dose in series: No      Estelle Pappas is a 22 y.o. female here for a non-provider visit for:   HPV 1 of 2    Reason for immunization: continue or complete series started at the office  Immunization records indicate need for vaccine: Yes, confirmed with [employer]  Minimum interval has been met for this vaccine: Yes  ABN completed: Not Indicated    VIS Dated  8/6/21 was given to patient: Yes  All IAC Questionnaire questions were answered \"No.\"    Patient tolerated injection and no adverse effects were observed or reported: Yes    Pt scheduled for next dose in series: No    "

## 2022-11-18 ENCOUNTER — OFFICE VISIT (OUTPATIENT)
Dept: MEDICAL GROUP | Facility: CLINIC | Age: 22
End: 2022-11-18
Payer: MEDICAID

## 2022-11-18 VITALS
HEIGHT: 61 IN | WEIGHT: 106 LBS | HEART RATE: 100 BPM | RESPIRATION RATE: 17 BRPM | BODY MASS INDEX: 20.01 KG/M2 | OXYGEN SATURATION: 97 % | TEMPERATURE: 98 F

## 2022-11-18 DIAGNOSIS — Z12.4 SCREENING FOR CERVICAL CANCER: ICD-10-CM

## 2022-11-18 PROCEDURE — 99213 OFFICE O/P EST LOW 20 MIN: CPT | Mod: GE | Performed by: STUDENT IN AN ORGANIZED HEALTH CARE EDUCATION/TRAINING PROGRAM

## 2022-11-18 ASSESSMENT — FIBROSIS 4 INDEX: FIB4 SCORE: 0.44

## 2022-11-18 ASSESSMENT — PATIENT HEALTH QUESTIONNAIRE - PHQ9: CLINICAL INTERPRETATION OF PHQ2 SCORE: 0

## 2022-11-18 NOTE — PROGRESS NOTES
HPI:  Patient is a 22 y.o. female. This pleasant patient is here today here for Pap  Sexually active, Men, one partner, HSV new diagnosis (one flare up)  GO does not want to be pregnant -  Period ever month, 11 menarche, 5-6 days, light to moderate, some cramping.     Rheumatologist is not open to hormonal BC, using condoms -  Denies h/o STI    Denies change in discharge/odor, pain with sex, post-coital bleeding, or irritation      No problems updated.                                                                                                                               Patient Active Problem List    Diagnosis Date Noted    Herpes simplex vulvovaginitis 10/17/2022    Cervical cancer screening 10/17/2022    Encounter for counseling regarding contraception 10/17/2022    Nausea 10/21/2021    Ganglion cyst of dorsum of left wrist 07/15/2021    SLE (systemic lupus erythematosus) ([facility]) 09/08/2019    MCTD (mixed connective tissue disease) ([facility]) 10/12/2018    Raynaud's phenomenon without gangrene 10/12/2018    Hemolytic anemia ([facility]) 11/22/2017       Current Outpatient Medications   Medication Sig Dispense Refill    valacyclovir (VALTREX) 1 GM Tab At first sign of herpes infection, take 1 tab by mouth daily for 5 days 20 Tablet 0    hydroxychloroquine (PLAQUENIL) 200 MG Tab TAKE 2 TABLETS BY MOUTH EVERY MONDAY, WEDNESDAY, FRIDAY 1 TABLET EVERY OTHER  Tablet 1    acetaminophen (TYLENOL) 325 MG Tab Take 2 Tablets by mouth every four hours as needed for Mild Pain.      Lidocaine 2 % Gel Apply 1 Application topically every four hours as needed (pain). 28.33 Each 0    valacyclovir (VALTREX) 1 GM Tab Take 1 Tablet by mouth 2 times a day. 20 Tablet 0    ondansetron (ZOFRAN ODT) 4 MG TABLET DISPERSIBLE Take 1 Tablet by mouth every 6 hours as needed for Nausea. 10 Tablet 0     No current facility-administered medications for this visit.         Allergies as of 11/18/2022 - Reviewed 10/17/2022   Allergen Reaction Noted  "   Amoxicillin Hives, Itching, and Swelling 08/31/2020          BP (P) 100/60 (BP Location: Left arm, Patient Position: Sitting, BP Cuff Size: Small adult)   Pulse 100   Temp 36.7 °C (98 °F) (Temporal)   Resp 17   Ht 1.549 m (5' 1\")   Wt 48.1 kg (106 lb)   SpO2 97%   BMI 20.03 kg/m²     Gen: no fever/chills  CV: No chest pain  Resp: No SOB  GI/: No bowel or bladder complaints  Psych: No depression      Physical Exam:  Gen:         Alert and oriented, No apparent distress.  Neck:        No Lymphadenopathy or Bruits.  Lungs:     Clear to auscultation bilaterally  CV:           Regular rate and rhythm. No murmurs, rubs or gallops.    Abdomen: soft, nontender, nondistended.    :  Perineum and external genitalia normal without rash. Vagina with normal and physiologic discharge. Cervix without visible lesions or discharge. Bimanual exam without adnexal masses or cervical motion tenderness.  Skin:      no rash or exudate             Ext:          No clubbing, cyanosis, edema.      Assessment and Plan    22 y.o. female with the following-  Problem List Items Addressed This Visit    None  Visit Diagnoses       Screening for cervical cancer        Relevant Orders    PAP Only        Declines STD testing, will reach out via [employer] with results and year next pap is due    Kathrine Pastor M.D.       "

## 2023-05-01 ENCOUNTER — OFFICE VISIT (OUTPATIENT)
Dept: MEDICAL GROUP | Facility: CLINIC | Age: 23
End: 2023-05-01
Payer: MEDICAID

## 2023-05-01 VITALS
TEMPERATURE: 98.4 F | OXYGEN SATURATION: 97 % | WEIGHT: 114.9 LBS | HEIGHT: 61 IN | SYSTOLIC BLOOD PRESSURE: 99 MMHG | BODY MASS INDEX: 21.69 KG/M2 | DIASTOLIC BLOOD PRESSURE: 64 MMHG | HEART RATE: 74 BPM

## 2023-05-01 DIAGNOSIS — L50.9 URTICARIA: ICD-10-CM

## 2023-05-01 PROCEDURE — 99213 OFFICE O/P EST LOW 20 MIN: CPT | Mod: GE | Performed by: HEALTH CARE PROVIDER

## 2023-05-01 RX ORDER — LORATADINE 10 MG/1
10 TABLET ORAL DAILY
Qty: 90 TABLET | Refills: 3 | Status: SHIPPED | OUTPATIENT
Start: 2023-05-01 | End: 2024-03-02

## 2023-05-01 ASSESSMENT — FIBROSIS 4 INDEX: FIB4 SCORE: 0.44

## 2023-05-01 NOTE — PROGRESS NOTES
"  [facility]    Subjective:     CC: Hives    HPI:   Estelle is a 22 y.o. female with past medical history of SLE on hydroxychloroquine, Reynaud's, mixed connective tissue disease presenting today for evaluation of recent hives. She describes waking with \"hives\" on back of thighs most mornings in past several weeks. The spots are itchy and improve with Benadryl. They generally resolve within 20-30 min from waking. She denies fever, chills, dyspnea, chest pain.      Current Outpatient Medications Ordered in Epic   Medication Sig Dispense Refill    loratadine (CLARITIN) 10 MG Tab Take 1 Tablet by mouth every day. 90 Tablet 3    hydroxychloroquine (PLAQUENIL) 200 MG Tab TAKE 2 TABLETS BY MOUTH EVERY MONDAY, WEDNESDAY, FRIDAY 1 TABLET EVERY OTHER  Tablet 1    acetaminophen (TYLENOL) 325 MG Tab Take 2 Tablets by mouth every four hours as needed for Mild Pain.       No current Epic-ordered facility-administered medications on file.         ROS:  Gen: no fevers/chills, no changes in weight  Eyes: no changes in vision  ENT: no sore throat, no hearing loss, no bloody nose  Pulm: no sob, no cough  CV: no chest pain, no palpitations  GI: no nausea/vomiting, no diarrhea  : no dysuria  MSk: no myalgias  Skin: See HPI  Neuro: no headaches, no numbness/tingling  Heme/Lymph: no easy bruising      Objective:     Exam:  BP 99/64 (BP Location: Right arm, Patient Position: Sitting, BP Cuff Size: Adult)   Pulse 74   Temp 36.9 °C (98.4 °F) (Temporal)   Ht 1.549 m (5' 1\")   Wt 52.1 kg (114 lb 14.4 oz)   SpO2 97%   BMI 21.71 kg/m²  Body mass index is 21.71 kg/m².    Gen:  Alert and oriented, No apparent distress.  HEENT: Neck is supple without lymphadenopathy, EOMI, no pharyngeal erythema or exudate  Lungs:  Normal effort, CTA bilaterally, no wheezes, rhonchi, or rales  CV:  Regular rate and rhythm. No murmurs, rubs, or gallops.  Abd:      Soft, non-tender, non-distended  Ext:  No clubbing, cyanosis, edema.      Labs: "     Results for orders placed or performed during the hospital encounter of 08/07/22   URINALYSIS,CULTURE IF INDICATED    Specimen: Urine   Result Value Ref Range    Color Yellow     Character Turbid (A)     Specific Gravity 1.030 <1.035    Ph 6.5 5.0 - 8.0    Glucose Negative Negative mg/dL    Ketones 15 (A) Negative mg/dL    Protein 100 (A) Negative mg/dL    Bilirubin Negative Negative    Urobilinogen, Urine 1.0 Negative    Nitrite Negative Negative    Leukocyte Esterase Large (A) Negative    Occult Blood Trace (A) Negative    Micro Urine Req Microscopic    Chlamydia/GC, PCR (Urine)    Specimen: Urine   Result Value Ref Range    C. trachomatis by PCR Negative Negative    Gc By Dna Probe Negative Negative    Source Urine    T. Pallidum AB EIA (Syphilis)   Result Value Ref Range    Syphilis, Treponemal Qual Non-Reactive Non-Reactive   HIV AG/AB Combo Assay Screening   Result Value Ref Range    HIV Ag/Ab Combo Assay Non-Reactive Non Reactive   BETA-HCG QUALITATIVE URINE   Result Value Ref Range    Beta-Hcg Urine Negative Negative   URINE MICROSCOPIC (W/UA)   Result Value Ref Range    WBC Packed (A) /hpf    RBC 2-5 (A) /hpf    Bacteria Negative None /hpf    Epithelial Cells Moderate (A) /hpf    Mucous Threads Many /hpf    Hyaline Cast 0-2 /lpf   HSV by PCR WRFLEX to HSV 1/2 Subtype   Result Value Ref Range    HSV DNA By PCR Detected (A)     HSV Source Vesicle fluid    VAGINAL PATHOGENS DNA PANEL   Result Value Ref Range    Candida species DNA Probe Negative Negative    Trichamonas vaginalis DNA Probe Negative Negative    Gardnerella vaginalis DNA Probe Negative Negative   URINE CULTURE(NEW)    Specimen: Urine   Result Value Ref Range    Significant Indicator NEG     Source UR     Site -     Culture Result Usual urogenital demetrice 10-50,000 cfu/mL    HSV-1 AND HSV-2 SUBTYPE, PCR   Result Value Ref Range    HSV Subtype Source Vesicle fluid     HSV 1 Subtype by PCR Detected (A)     HSV 2 Subtype by PCR Not Detected           Assessment & Plan:     22 y.o. female with the following -     Problem List Items Addressed This Visit       Urticaria     Presents with history consistent with intermittent urticaria, primarily on posterior thighs. No lesions visible at this time. Recommend trial of Claritin 10 mg daily for 2-4 weeks then discontinuing. During that time, patient advised to return to normal detergent for bedsheets and consider any other nighttime exposures since lesions are most common when waking in AM.                    Future Appointments   Date Time Provider Department Center   6/28/2023  8:30 AM Naomie Deshpande M.D. [facility] None         Kevyn Leal M.D.  [facility]  PGY-2

## 2023-05-01 NOTE — ASSESSMENT & PLAN NOTE
Presents with history consistent with intermittent urticaria, primarily on posterior thighs. No lesions visible at this time. Recommend trial of Claritin 10 mg daily for 2-4 weeks then discontinuing. During that time, patient advised to return to normal detergent for bedsheets and consider any other nighttime exposures since lesions are most common when waking in AM.

## 2023-05-08 ENCOUNTER — OFFICE VISIT (OUTPATIENT)
Dept: URGENT CARE | Facility: CLINIC | Age: 23
End: 2023-05-08
Payer: MEDICAID

## 2023-05-08 ENCOUNTER — APPOINTMENT (OUTPATIENT)
Dept: URGENT CARE | Facility: PHYSICIAN GROUP | Age: 23
End: 2023-05-08
Payer: MEDICAID

## 2023-05-08 VITALS
BODY MASS INDEX: 21.71 KG/M2 | TEMPERATURE: 97.6 F | WEIGHT: 115 LBS | SYSTOLIC BLOOD PRESSURE: 116 MMHG | HEIGHT: 61 IN | RESPIRATION RATE: 14 BRPM | OXYGEN SATURATION: 96 % | HEART RATE: 66 BPM | DIASTOLIC BLOOD PRESSURE: 70 MMHG

## 2023-05-08 DIAGNOSIS — B35.9 TINEA: ICD-10-CM

## 2023-05-08 PROCEDURE — 99213 OFFICE O/P EST LOW 20 MIN: CPT | Performed by: NURSE PRACTITIONER

## 2023-05-08 RX ORDER — KETOCONAZOLE 20 MG/G
1 CREAM TOPICAL DAILY
Qty: 60 G | Refills: 0 | Status: SHIPPED | OUTPATIENT
Start: 2023-05-08 | End: 2023-05-29

## 2023-05-08 ASSESSMENT — ENCOUNTER SYMPTOMS
FATIGUE: 0
FEVER: 0
SHORTNESS OF BREATH: 0

## 2023-05-08 ASSESSMENT — FIBROSIS 4 INDEX: FIB4 SCORE: 0.44

## 2023-05-09 NOTE — PROGRESS NOTES
"Subjective:   Estelle Pappas is a 22 y.o. female who presents for Rash (On left Thigh )      Rash  This is a new problem. The current episode started in the past 7 days (Erythematous circular rash left thigh). The problem has been gradually improving since onset. Location: left thigh. The rash is characterized by redness and itchiness. It is unknown if there was an exposure to a precipitant. Pertinent negatives include no congestion, fatigue, fever or shortness of breath. Past treatments include topical steroids. The treatment provided mild relief. There is no history of allergies or eczema.     Review of Systems   Constitutional:  Negative for fatigue and fever.   HENT:  Negative for congestion.    Respiratory:  Negative for shortness of breath.    Skin:  Positive for itching and rash.     Medications:    acetaminophen Tabs  hydroxychloroquine Tabs  ketoconazole Crea  loratadine Tabs    Allergies: Amoxicillin    Problem List: Estelle Pappas does not have any pertinent problems on file.    Surgical History:  Past Surgical History:   Procedure Laterality Date    CATH PLACEMENT      5 days right jugular for plasma pharesis       Past Social Hx: Estelle Pappas  reports that she has never smoked. She has never used smokeless tobacco. She reports that she does not drink alcohol and does not use drugs.     Past Family Hx:  Estelle Pappas family history includes Hyperlipidemia in her father.     Problem list, medications, and allergies reviewed by myself today in Epic.     Objective:     /70 (BP Location: Right arm, Patient Position: Sitting, BP Cuff Size: Adult long)   Pulse 66   Temp 36.4 °C (97.6 °F) (Temporal)   Resp 14   Ht 1.549 m (5' 1\")   Wt 52.2 kg (115 lb)   SpO2 96%   BMI 21.73 kg/m²     Physical Exam  Constitutional:       Appearance: Normal appearance. She is not ill-appearing or toxic-appearing.   HENT:      Head: Normocephalic.      Right Ear: External ear normal. "      Left Ear: External ear normal.      Nose: Nose normal.      Mouth/Throat:      Lips: Pink.   Eyes:      General: Lids are normal.   Pulmonary:      Effort: Pulmonary effort is normal. No accessory muscle usage.   Musculoskeletal:      Cervical back: Full passive range of motion without pain.   Skin:     Findings: Rash present.             Comments: Circular rash left thigh mildly erythematous central clearing noted,   Neurological:      Mental Status: She is alert and oriented to person, place, and time.   Psychiatric:         Mood and Affect: Mood normal.         Thought Content: Thought content normal.       Assessment/Plan:     Diagnosis and associated orders:     1. Tinea  ketoconazole (NIZORAL) 2 % Cream         Comments/MDM:     I personally reviewed prior external notes and prior test results pertinent to today's visit.  Was able to review images from patient's phone of initial presentation of rash which was consistent with a tinea rash we will treat with topical fungal's  Discussed management options, risks and benefits, and alternatives to treatment plan agreed upon.   Red flags discussed and indications to immediately call 911 or present to the Emergency Department.   Supportive care, differential diagnoses, and indications for immediate follow-up discussed with patient.    Patient expresses understanding and agrees to plan. Patient denies any other questions or concerns.                Please note that this dictation was created using voice recognition software. I have made a reasonable attempt to correct obvious errors, but I expect that there are errors of grammar and possibly content that I did not discover before finalizing the note.    This note was electronically signed by Asael ASHLEY.

## 2023-06-06 NOTE — PROGRESS NOTES
"Pediatric Hematology/Oncology Clinic  Progress Note      Patient Name:  Estelle Cowart  : 2000   MRN: [id number]    Location of Service: [facility]    Date of Service: 2018  Time: 11:30 AM    Primary Care Physician: Trenton Anaya M.D.    HISTORY OF PRESENT ILLNESS:     Chief Complaint: Fatigue, Congestion, Occasional Headaches x 4-5 days    History of Present Illness: Estelle Cowart is a 18 y.o.  female who returns to the [facility] acutely with her father for concerns of fatigue, congestion and occasional headaches in the past 4-5 days.      Per history, Estelle has been healthy since her last clinic  Visit, but about 4-5 days ago she started feeling some fatigue.  She has noticed this primarily with exertion in the form of walking.  She states taht on several occasions she has had to slow grant or even stop what she was doing and she had become fatigued.  She denies any headache that is worse, more frequent or more prolonged than previously \"Plaquenil\" related headaches.  She has had shortness of breath with exertion, but denies difficulty breathing.  Estelle reports that about 3 days ago she started to develop significant nasal congestion and last night, she report the smell of blood coming from the back of her nose and had a little be of blood in some spit produced.  She otherwise denies symptoms.  She has not had any fever or cough.  She denies any rash, denies any bruising or bleeding.  Estelle does not report any nausea, vomiting, diarrhea or constipation.  No blood in urine or in stool.  No darkened urine.  No changes in vision.    She has been attending school.  Sleeping well.     Review of Systems:     Constitutional: Afebrile.  Energy and activity are decreased.  Not able to keep up with brother walking.   HENT: Negative for ear pain.  Considerable nasal congestion.  Eyes: Negative for visual changes.  Respiratory: " Negative for shortness of breath or noisy breathing.  No cough.  Cardiovascular: Negative for chest pain or extremity swelling.    Gastrointestinal: Negative for nausea, vomiting, abdominal pain, diarrhea, constipation or blood in stool.    Genitourinary: Negative for painful urination, blood in urine or flank pain.    Musculoskeletal: Negative for joint or muscle pains.    Skin: Negative for rash, signs of infection.  Neurological: Negative for numbness, tingling, sensory changes, weakness or headaches.    Endo/Heme/Allergies: Does not bruise/bleed easily.    Psychiatric/Behavioral: No changes in mood, appropriate for age.     PAST MEDICAL HISTORY:     Past Medical History:    1) Vitiligo  2) Thrombotic Thrombocytopenia Purpura  3) DENILSON Positive / anti-RNP, anti-Ro, anti-SM antibody Positive   4) Alopecia  5) Raynauds phenomenon  6) Rituximab associated neutropenia (resolved)     Thrombotic Thrombocytopenia Purpura History:  Diagnosed 11/22/17  Platelets 11, Hgb 7.5, Cr 0.63, LDH 2355  FARA IgG Positive, FARA C3d negative  [id number] (11/23/17)   HUS negative  HAV IgG positive, Hep B negative, Hep C negative  Plasma exchange, 1.5 x volume with FFP daily x 5 days  Stable labs until 12/21/17 when platelets began to drop to 102, normal LDH, no MAHA findings on peripheral smear  Full relapse of disease with increased LDH to 452,  platelets to 15 and drop in Hgb to 8.2 -12/27  Admission for plasma exchange 12/28/17  S/P Rituximab Therapy 1/8/18, 1/15/18, 1/22/18, 1/29/18  Started on Plaquenil for rheumatologic disease (Dr. Jean)     Past Surgical History:     1) RIJ Vascular Catheter placement 11/23/17  2) Right femoral Vascular Catheter placement     Birth/Developmental History:    2nd of 3 children  No complications of pregnancy   36 weeks EGA per father  No complications of delivery  No concerns for growth or development    Allergies:   Allergies as of 09/19/2018   • (No Known Allergies)     Social  History: Lives at home with mother, father, 2 brothers.  Recently graduated from [employer].  Will be starting college courses at end of the month.  No extramural activities.  No alcohol, no drugs.      Family History:     Maternal grandmother with diabetes.  Family history otherwise completely unremarkable  No blood disorders, bleeding or clotting disorders  No history of anemia  No rheumatologic disease or autoimmune disease     Immunizations:  Up to date with the exception of 1 immunization (meningococcal?)    Medications:   Current Outpatient Prescriptions on File Prior to Visit   Medication Sig Dispense Refill   • hydroxychloroquine (PLAQUENIL) 200 MG Tab Take 1 Tab by mouth every day. 30 Tab 1   • acetaminophen (TYLENOL) 325 MG Tab Take 650 mg by mouth every four hours as needed.     • ondansetron (ZOFRAN ODT) 8 MG TABLET DISPERSIBLE Take 1 Tab by mouth every 8 hours as needed for Nausea. 15 Tab 0     No current facility-administered medications on file prior to visit.        OBJECTIVE:     Vitals:   Blood pressure 123/74, pulse (!) 102, temperature 36.9 °C (98.4 °F), temperature source Temporal, resp. rate 18, weight 54 kg (119 lb 0.8 oz), SpO2 96 %, not currently breastfeeding.    Labs:    Hospital Outpatient Visit on 09/19/2018   Component Date Value   • WBC 09/19/2018 5.0    • RBC 09/19/2018 4.68    • Hemoglobin 09/19/2018 13.4    • Hematocrit 09/19/2018 38.6    • MCV 09/19/2018 82.5    • MCH 09/19/2018 28.6    • MCHC 09/19/2018 34.7    • RDW 09/19/2018 40.3    • Platelet Count 09/19/2018 258    • MPV 09/19/2018 9.9    • Neutrophils-Polys 09/19/2018 75.20*   • Lymphocytes 09/19/2018 11.50*   • Monocytes 09/19/2018 11.70    • Eosinophils 09/19/2018 1.20    • Basophils 09/19/2018 0.20    • Immature Granulocytes 09/19/2018 0.20    • Nucleated RBC 09/19/2018 0.00    • Neutrophils (Absolute) 09/19/2018 3.72    • Lymphs (Absolute) 09/19/2018 0.57*   • Monos (Absolute) 09/19/2018 0.58    • Eos (Absolute)  09/19/2018 0.06    • Baso (Absolute) 09/19/2018 0.01    • Immature Granulocytes (a* 09/19/2018 0.01    • NRBC (Absolute) 09/19/2018 0.00    • Sodium 09/19/2018 136    • Potassium 09/19/2018 3.8    • Chloride 09/19/2018 105    • Co2 09/19/2018 22    • Anion Gap 09/19/2018 9.0    • Glucose 09/19/2018 85    • Bun 09/19/2018 8    • Creatinine 09/19/2018 0.51    • Calcium 09/19/2018 9.3    • AST(SGOT) 09/19/2018 27    • ALT(SGPT) 09/19/2018 24    • Alkaline Phosphatase 09/19/2018 77    • Total Bilirubin 09/19/2018 0.3    • Albumin 09/19/2018 4.4    • Total Protein 09/19/2018 8.2    • Globulin 09/19/2018 3.8*   • A-G Ratio 09/19/2018 1.2    • LDH Total 09/19/2018 165    • Reticulocyte Count 09/19/2018 0.8    • Retic, Absolute 09/19/2018 0.04    • Imm. Reticulocyte Fracti* 09/19/2018 3.6*   • Retic Hgb Equivalent 09/19/2018 29.6    • GFR If  09/19/2018 >60    • GFR If Non  Ameri* 09/19/2018 >60      Physical Exam:    Constitutional: Well-developed, well-nourished.  Tearful and scared.  HENT: Normocephalic and atraumatic. Moderate to severe nasal congestion with hypernasal speech.  No overt rhinorrhea or discharge . Oropharynx is clear and moist.  No oral ulcerations or sores.  No enlargement of tonsils.  No palatal petechiae.    Eyes: Conjunctivae are injected from crying. Pupils are equal, round, and reactive to light.  Non-icteric.  Neck: Normal range of motion of neck, no adenopathy.    Cardiovascular: Normal rate, regular rhythm and normal heart sounds.  No murmur heard. DP/radial pulses 2+, cap refill < 2 sec  Pulmonary/Chest: Effort normal and breath sounds normal. No respiratory distress. Symmetric expansion.  No crackles or wheezes.  Abdomen: Soft. Bowel sounds are normal. No distension and no mass. There is no hepatosplenomegaly.    Genitourinary:  Deferred.  Musculoskeletal: Normal range of motion of lower and upper extremities bilaterally. No tenderness to palpation of elbows, wrists,  hands, knees, ankles and feet bilaterally.   Lymphadenopathy: No cervical adenopathy, axillary adenopathy or inguinal adenopathy.   Neurological: Alert and oriented to person and place. Exhibits normal muscle tone bilaterally in upper and lower extremities. Gait normal. Coordination normal.    Skin: Skin is warm, dry and pink.  No rash or evidence of skin infection.  No pallor.  No bruising.  No petechiae.  Psychiatric: Mood and affect normal for age.  Scared as above.    ASSESSMENT AND PLAN:     Estelle Cowart is a 18 year old female with a history of thrombotic thrombocytopenia purpura s/p Rituximab therapy for acute fatigue    1) Fatigue:   - Constellation of symptoms at presentation and history consistent with URI   - Encouraged rest and supportive care   - If fatigue worsening or development of new symptoms, have encouraged patient to bring them to medical attention    2) Nasal Congestion:   - As above, URI likely   - Recommendation for Afrin use with restriction to 48-72 hours use only    3) Blood in Nose:   - Also likely related to current illness   - Nasal wash, humidifier, shower recommended    4) Thrombotic Thrombocytopenia Purpura in Remission:              - CBC normal without an evidence of TTP recurrence               Disposition:  Discharge home with supportive care.  RTC as scheduled for follow-up of TTP.    Gregory Gunn MD  Pediatric Hematology / Oncology  [facility]  Cell.  [phone]  Office. [phone]             V-Y Plasty Text: The defect edges were debeveled with a #15 scalpel blade.  Given the location of the defect, shape of the defect and the proximity to free margins an V-Y advancement flap was deemed most appropriate.  Using a sterile surgical marker, an appropriate advancement flap was drawn incorporating the defect and placing the expected incisions within the relaxed skin tension lines where possible.    The area thus outlined was incised deep to adipose tissue with a #15 scalpel blade.  The skin margins were undermined to an appropriate distance in all directions utilizing iris scissors.

## 2023-09-05 ENCOUNTER — OFFICE VISIT (OUTPATIENT)
Dept: URGENT CARE | Facility: CLINIC | Age: 23
End: 2023-09-05
Payer: MEDICAID

## 2023-09-05 VITALS
BODY MASS INDEX: 21.71 KG/M2 | WEIGHT: 115 LBS | SYSTOLIC BLOOD PRESSURE: 122 MMHG | OXYGEN SATURATION: 100 % | TEMPERATURE: 97.3 F | DIASTOLIC BLOOD PRESSURE: 80 MMHG | HEART RATE: 78 BPM | RESPIRATION RATE: 16 BRPM | HEIGHT: 61 IN

## 2023-09-05 DIAGNOSIS — A60.04 HERPES SIMPLEX VULVOVAGINITIS: ICD-10-CM

## 2023-09-05 PROCEDURE — 3079F DIAST BP 80-89 MM HG: CPT | Performed by: FAMILY MEDICINE

## 2023-09-05 PROCEDURE — 99213 OFFICE O/P EST LOW 20 MIN: CPT | Performed by: FAMILY MEDICINE

## 2023-09-05 PROCEDURE — 3074F SYST BP LT 130 MM HG: CPT | Performed by: FAMILY MEDICINE

## 2023-09-05 RX ORDER — ACYCLOVIR 400 MG/1
400 TABLET ORAL 3 TIMES DAILY
Qty: 21 TABLET | Refills: 0 | Status: SHIPPED | OUTPATIENT
Start: 2023-09-05 | End: 2023-09-12

## 2023-09-05 NOTE — PROGRESS NOTES
"  Subjective:      23 y.o. female presents to urgent care for possible genital herpes outbreak.  About 2 days ago she noticed some pain and rash to her vulva.  Pain is intermittent, comes with certain positions, is described as throbbing, currently rated 3/10.  She has been using Femiclear gel with some relief in symptoms.  She was first diagnosed with genital herpes earlier this year, this is her third outbreak total    She denies any other questions or concerns at this time.    Current problem list, medication, and past medical/surgical history were reviewed in Epic.    ROS  See HPI     Objective:      /80   Pulse 78   Temp 36.3 °C (97.3 °F)   Resp 16   Ht 1.549 m (5' 1\")   Wt 52.2 kg (115 lb)   SpO2 100%   BMI 21.73 kg/m²     Physical Exam  Constitutional:       General: She is not in acute distress.     Appearance: She is not diaphoretic.   Cardiovascular:      Rate and Rhythm: Normal rate and regular rhythm.      Heart sounds: Normal heart sounds.   Pulmonary:      Effort: Pulmonary effort is normal. No respiratory distress.      Breath sounds: Normal breath sounds.   Genitourinary:     Labia:         Right: Lesion present.         Left: Lesion present.    Neurological:      Mental Status: She is alert.   Psychiatric:         Mood and Affect: Affect normal.         Judgment: Judgment normal.       Assessment/Plan:     1. Herpes simplex vulvovaginitis  Lesions noted on physical exam that are consistent with genital herpes.  Prescription for acyclovir has been sent.  - acyclovir (ZOVIRAX) 400 MG tablet; Take 1 Tablet by mouth 3 times a day for 7 days.  Dispense: 21 Tablet; Refill: 0      Instructed to return to Urgent Care or nearest Emergency Department if symptoms fail to improve, for any change in condition, further concerns, or new concerning symptoms. Patient states understanding of the plan of care and discharge instructions.    Re Valentine M.D.   "

## 2023-09-24 ENCOUNTER — OFFICE VISIT (OUTPATIENT)
Dept: URGENT CARE | Facility: CLINIC | Age: 23
End: 2023-09-24
Payer: MEDICAID

## 2023-09-24 VITALS
WEIGHT: 115 LBS | SYSTOLIC BLOOD PRESSURE: 90 MMHG | RESPIRATION RATE: 16 BRPM | TEMPERATURE: 98.1 F | OXYGEN SATURATION: 99 % | DIASTOLIC BLOOD PRESSURE: 54 MMHG | HEART RATE: 75 BPM | HEIGHT: 61 IN | BODY MASS INDEX: 21.71 KG/M2

## 2023-09-24 DIAGNOSIS — B37.31 VAGINAL YEAST INFECTION: ICD-10-CM

## 2023-09-24 DIAGNOSIS — A60.04 HERPES SIMPLEX VULVOVAGINITIS: ICD-10-CM

## 2023-09-24 PROCEDURE — 3078F DIAST BP <80 MM HG: CPT | Performed by: NURSE PRACTITIONER

## 2023-09-24 PROCEDURE — 99213 OFFICE O/P EST LOW 20 MIN: CPT | Performed by: NURSE PRACTITIONER

## 2023-09-24 PROCEDURE — 3074F SYST BP LT 130 MM HG: CPT | Performed by: NURSE PRACTITIONER

## 2023-09-24 RX ORDER — ACYCLOVIR 400 MG/1
400 TABLET ORAL 3 TIMES DAILY
Qty: 21 TABLET | Refills: 0 | Status: SHIPPED | OUTPATIENT
Start: 2023-09-24 | End: 2023-10-01

## 2023-09-24 RX ORDER — FLUCONAZOLE 150 MG/1
TABLET ORAL
Qty: 2 TABLET | Refills: 0 | Status: SHIPPED | OUTPATIENT
Start: 2023-09-24 | End: 2024-01-08

## 2023-09-24 NOTE — PROGRESS NOTES
Date: 09/24/23     Chief Complaint:    Chief Complaint   Patient presents with    Vaginal Itching     Hx of genital herpes         History of Present Illness: 23 y.o.  female presents to clinic with vulva itching irritation and tenderness.  Patient does have a history of genital herpes.  Although she states that this irritation is different than her previous outbreaks.  She does have SLE and is on Plaquenil.  She did use homeopathic topical HSV cream which did improve her symptoms.  She denies any vaginal itching discharge or pain.  She denies any burning with urination urinary frequency urgency.No fevers bodyaches or flank pain.       ROS:    As stated in HPI     Medical/SX/ Social History:  Reviewed per chart    Pertinent Medications:    Current Outpatient Medications on File Prior to Visit   Medication Sig Dispense Refill    hydroxychloroquine (PLAQUENIL) 200 MG Tab TAKE 2 TABLETS BY MOUTH EVERY MONDAY, WEDNESDAY, FRIDAY 1 TABLET EVERY OTHER  Tablet 1    loratadine (CLARITIN) 10 MG Tab Take 1 Tablet by mouth every day. 90 Tablet 3    acetaminophen (TYLENOL) 325 MG Tab Take 2 Tablets by mouth every four hours as needed for Mild Pain.       No current facility-administered medications on file prior to visit.        Allergies:    Amoxicillin     Problem list, medications, and allergies reviewed by myself today in Epic     Physical Exam:    Vitals:    09/24/23 0949   BP: 90/54   Pulse: 75   Resp: 16   Temp: 36.7 °C (98.1 °F)   SpO2: 99%             Physical Exam  Constitutional:       General: She is not in acute distress.     Appearance: Normal appearance. She is well-developed. She is not ill-appearing or toxic-appearing.   HENT:      Head: Normocephalic and atraumatic.   Cardiovascular:      Rate and Rhythm: Normal rate and regular rhythm.      Heart sounds: Normal heart sounds.   Pulmonary:      Effort: Pulmonary effort is normal. No respiratory distress.      Breath sounds: Normal breath sounds. No  wheezing.   Abdominal:      General: Abdomen is flat. Bowel sounds are normal.      Palpations: Abdomen is soft.      Tenderness: There is no abdominal tenderness. There is no right CVA tenderness, left CVA tenderness, guarding or rebound.   Genitourinary:     Exam position: Supine.       Skin:     General: Skin is warm.      Capillary Refill: Capillary refill takes less than 2 seconds.      Coloration: Skin is not cyanotic or pale.   Neurological:      Mental Status: She is alert and oriented to person, place, and time.      Gait: Gait is intact.   Psychiatric:         Behavior: Behavior normal. Behavior is cooperative.            Medical Decision making and plan :  I personally reviewed prior external notes and test results pertinent to today's visit. Pt is clinically stable at today's acute urgent care visit.  Patient appears nontoxic with no acute distress noted. Appropriate for outpatient care at this time. The patient remained stable during the urgent care visit.     Pleasant 23 y.o. female presented clinic with tenderness and itching to her vulva.  On exam there is a small ulceration which is likely an vesicle secondary to herpes simplex virus.  Although the surrounding vaginal tissue is pale and friable which is consistent with a yeast infection.  Using shared decision making we will start patient on Diflucan.  If she begins having more vesicles or symptoms of HSV flare she will then start the acyclovir. Shared decision-making was utilized with patient for treatment plan.  Differential Diagnosis, natural history, and supportive care discussed.      1. Herpes simplex vulvovaginitis    - acyclovir (ZOVIRAX) 400 MG tablet; Take 1 Tablet by mouth 3 times a day for 7 days.  Dispense: 21 Tablet; Refill: 0    2. Vaginal yeast infection    - fluconazole (DIFLUCAN) 150 MG tablet; Take one tablet orally for yeast infection, if symptoms persist, may repeat treatment after 72 hours.  Dispense: 2 Tablet; Refill: 0        Medication discussed included indication for use and the potential benefits and side effects. Education was provided regarding the aforementioned assessments.  All of the patient's questions were answered to their satisfaction at the time of discharge. Patient was encouraged to monitor symptoms closely. Those signs and symptoms which would warrant concern and mandate seeking a higher level of service through the emergency department discussed at length.  Patient stated agreement and understanding of this plan of care.    Disposition:  Home in stable condition       Voice Recognition Disclaimer:  Portions of this document were created using voice recognition software. The software does have a chance of producing errors of grammar and possibly content. I have made every reasonable attempt to correct obvious errors, but there may be errors of grammar and possibly content that I did not discover before finalizing the documentation.    Mireya Reyes, [email or web address].

## 2023-10-10 ENCOUNTER — OFFICE VISIT (OUTPATIENT)
Dept: URGENT CARE | Facility: CLINIC | Age: 23
End: 2023-10-10
Payer: MEDICAID

## 2023-10-10 VITALS
HEART RATE: 72 BPM | RESPIRATION RATE: 14 BRPM | OXYGEN SATURATION: 98 % | DIASTOLIC BLOOD PRESSURE: 62 MMHG | SYSTOLIC BLOOD PRESSURE: 100 MMHG | WEIGHT: 115 LBS | HEIGHT: 61 IN | TEMPERATURE: 98.4 F | BODY MASS INDEX: 21.71 KG/M2

## 2023-10-10 DIAGNOSIS — R21 RASH: ICD-10-CM

## 2023-10-10 PROCEDURE — 99214 OFFICE O/P EST MOD 30 MIN: CPT

## 2023-10-10 PROCEDURE — 3074F SYST BP LT 130 MM HG: CPT

## 2023-10-10 PROCEDURE — 3078F DIAST BP <80 MM HG: CPT

## 2023-10-10 RX ORDER — TRIAMCINOLONE ACETONIDE OINTMENT USP, 0.05% 0.5 MG/G
1 OINTMENT TOPICAL 2 TIMES DAILY PRN
Qty: 430 G | Refills: 0 | Status: SHIPPED | OUTPATIENT
Start: 2023-10-10 | End: 2024-01-08

## 2023-10-10 NOTE — PROGRESS NOTES
Subjective:   Estelle Pappas is a 23 y.o. female who presents for Rash (Poss ringworm, x 3 days )      HPI:    Patient presents urgent care with concerns of possible ringworm rash.  Patient reports rash is located over her intergluteal cleft.   She states the rash is pink, flat.  She denies scale or expanding and ringlike structure of rash.  She states the rash is pruritic and not painful.  She states she had ringworm approximately month ago located on her left lower thigh, which was treated with antifungal cream. She has applied the cream to the new rash, which has not improved the rash.   She has a previous medical history of lupus, she reports she has not been taking her Plaquenil as ordered.  She states she forgets often, and she states she has to set an alarm as reminder to take the medication.  She states she has been having hive-like rashes on her arms with the decreased compliance with Plaquenil as well.   Denies presence of rash in other parts of her body.  She also with history of herpes simplex.   Denies fever, prodromal symptoms  Contact with new medications, products, foods.    ROS As above in HPI    Medications:    Current Outpatient Medications on File Prior to Visit   Medication Sig Dispense Refill    hydroxychloroquine (PLAQUENIL) 200 MG Tab TAKE 2 TABLETS BY MOUTH EVERY MONDAY, WEDNESDAY, FRIDAY 1 TABLET EVERY OTHER  Tablet 1    loratadine (CLARITIN) 10 MG Tab Take 1 Tablet by mouth every day. 90 Tablet 3    acetaminophen (TYLENOL) 325 MG Tab Take 2 Tablets by mouth every four hours as needed for Mild Pain.      fluconazole (DIFLUCAN) 150 MG tablet Take one tablet orally for yeast infection, if symptoms persist, may repeat treatment after 72 hours. (Patient not taking: Reported on 10/10/2023) 2 Tablet 0     No current facility-administered medications on file prior to visit.        Allergies:   Amoxicillin    Problem List:   Patient Active Problem List   Diagnosis    MCTD (mixed  "connective tissue disease) (HCC)    Raynaud's phenomenon without gangrene    Hemolytic anemia (HCC)    SLE (systemic lupus erythematosus) (HCC)    Ganglion cyst of dorsum of left wrist    Nausea    Herpes simplex vulvovaginitis    Cervical cancer screening    Encounter for counseling regarding contraception    Urticaria        Surgical History:  Past Surgical History:   Procedure Laterality Date    CATH PLACEMENT      5 days right jugular for plasma pharesis       Past Social Hx:   Social History     Tobacco Use    Smoking status: Never    Smokeless tobacco: Never   Vaping Use    Vaping Use: Never used   Substance Use Topics    Alcohol use: No    Drug use: No          Problem list, medications, and allergies reviewed by myself today in Epic.     Objective:     /62   Pulse 72   Temp 36.9 °C (98.4 °F)   Resp 14   Ht 1.549 m (5' 1\")   Wt 52.2 kg (115 lb)   SpO2 98%   BMI 21.73 kg/m²     Physical Exam  Vitals and nursing note reviewed.   Constitutional:       Appearance: Normal appearance.   HENT:      Head: Normocephalic and atraumatic.   Cardiovascular:      Rate and Rhythm: Normal rate and regular rhythm.      Heart sounds: Normal heart sounds.   Pulmonary:      Effort: Pulmonary effort is normal.      Breath sounds: Normal breath sounds.   Abdominal:      General: Bowel sounds are normal.      Palpations: Abdomen is soft.   Skin:     Findings: Erythema and rash present. Rash is macular and papular.             Comments: Superior intergluteal cleft and proximal aspects of bilateral buttocks have erythematous macular papular lesion.  No annular presentation.  No vesicles, bullae, scaling, crusts.  No warmth, edema, fluctuance.  Not tender to palpation.  No excoriation.   Neurological:      Mental Status: She is alert and oriented to person, place, and time.         Assessment/Plan:     Diagnosis and associated orders:   1. Rash  - TRIAMCINOLONE ACETONIDE, TOP, 0.05 % Ointment; Apply 1 Application " topically 2 times a day as needed (rash).  Dispense: 430 g; Refill: 0        Comments/MDM:     Rash appears to be consistent with SLE maculopapular lesions.  Not annular.  Does not have typical ringworm presentation.  No vesicles, bullae, scale or crust appreciated.  No signs of secondary infection.  Encourage patient to resume use of Plaquenil.  Reviewed use of triamcinolone ointment.  Patient has follow-up appointment with her rheumatologist at the end of this month.  She is advised to keep appointment as planned.  Follow-up with primary care is advised otherwise return to urgent care if there is no improvement in rash.       Please note that this dictation was created using voice recognition software. I have made a reasonable attempt to correct obvious errors, but I expect that there are errors of grammar and possibly content that I did not discover before finalizing the note.    This note was electronically signed by DION Lemus

## 2024-02-27 ENCOUNTER — HOSPITAL ENCOUNTER (OUTPATIENT)
Facility: MEDICAL CENTER | Age: 24
End: 2024-02-27
Attending: NURSE PRACTITIONER
Payer: COMMERCIAL

## 2024-02-27 ENCOUNTER — OFFICE VISIT (OUTPATIENT)
Dept: URGENT CARE | Facility: CLINIC | Age: 24
End: 2024-02-27
Payer: MEDICAID

## 2024-02-27 VITALS
HEART RATE: 90 BPM | RESPIRATION RATE: 20 BRPM | SYSTOLIC BLOOD PRESSURE: 90 MMHG | OXYGEN SATURATION: 97 % | HEIGHT: 61 IN | BODY MASS INDEX: 21.49 KG/M2 | TEMPERATURE: 98.2 F | WEIGHT: 113.8 LBS | DIASTOLIC BLOOD PRESSURE: 60 MMHG

## 2024-02-27 DIAGNOSIS — J02.9 VIRAL PHARYNGITIS: ICD-10-CM

## 2024-02-27 DIAGNOSIS — R30.0 DYSURIA: ICD-10-CM

## 2024-02-27 LAB
APPEARANCE UR: NORMAL
BILIRUB UR STRIP-MCNC: NEGATIVE MG/DL
COLOR UR AUTO: YELLOW
FLUAV RNA SPEC QL NAA+PROBE: NEGATIVE
FLUBV RNA SPEC QL NAA+PROBE: NEGATIVE
GLUCOSE UR STRIP.AUTO-MCNC: NEGATIVE MG/DL
KETONES UR STRIP.AUTO-MCNC: NEGATIVE MG/DL
LEUKOCYTE ESTERASE UR QL STRIP.AUTO: NORMAL
NITRITE UR QL STRIP.AUTO: NEGATIVE
PH UR STRIP.AUTO: 7 [PH] (ref 5–8)
POCT INT CON NEG: NEGATIVE
POCT INT CON POS: POSITIVE
POCT URINE PREGNANCY TEST: NEGATIVE
PROT UR QL STRIP: NEGATIVE MG/DL
RBC UR QL AUTO: NORMAL
RSV RNA SPEC QL NAA+PROBE: NEGATIVE
S PYO DNA SPEC NAA+PROBE: NOT DETECTED
SARS-COV-2 RNA RESP QL NAA+PROBE: NEGATIVE
SP GR UR STRIP.AUTO: 1.02
UROBILINOGEN UR STRIP-MCNC: 0.2 MG/DL

## 2024-02-27 PROCEDURE — 81025 URINE PREGNANCY TEST: CPT | Performed by: NURSE PRACTITIONER

## 2024-02-27 PROCEDURE — 3074F SYST BP LT 130 MM HG: CPT | Performed by: NURSE PRACTITIONER

## 2024-02-27 PROCEDURE — 99213 OFFICE O/P EST LOW 20 MIN: CPT | Performed by: NURSE PRACTITIONER

## 2024-02-27 PROCEDURE — 87637 SARSCOV2&INF A&B&RSV AMP PRB: CPT | Mod: QW | Performed by: NURSE PRACTITIONER

## 2024-02-27 PROCEDURE — 87651 STREP A DNA AMP PROBE: CPT | Performed by: NURSE PRACTITIONER

## 2024-02-27 PROCEDURE — 3078F DIAST BP <80 MM HG: CPT | Performed by: NURSE PRACTITIONER

## 2024-02-27 PROCEDURE — 81002 URINALYSIS NONAUTO W/O SCOPE: CPT | Performed by: NURSE PRACTITIONER

## 2024-02-27 PROCEDURE — 87086 URINE CULTURE/COLONY COUNT: CPT

## 2024-02-27 NOTE — PROGRESS NOTES
Chief Complaint   Patient presents with    Fever    Chills    Other     Recent wisdom teeth pulled, had dry socket, dentist said no infection in it yesterday    UTI     Hx of genital herpes, no visible outbreak, burning with urination x1day       HISTORY OF PRESENT ILLNESS: Patient is a pleasant 23 y.o. female who presents to urgent care today with numerous complaints.  She endorses 4 days of a sore throat.  She became concerned today when she developed tactile fever and chills.  She also had an episode of dysuria today as well.  She is currently on her menses.  She denies any nausea or vomiting.  She also reports she had recent wisdom teeth pulled on 2/16.  She was evaluated by her dentist yesterday who reported no complications or concern for infection.  She denies any known ill contacts.    Patient Active Problem List    Diagnosis Date Noted    Urticaria 05/01/2023    Herpes simplex vulvovaginitis 10/17/2022    Cervical cancer screening 10/17/2022    Encounter for counseling regarding contraception 10/17/2022    Nausea 10/21/2021    Ganglion cyst of dorsum of left wrist 07/15/2021    SLE (systemic lupus erythematosus) ([facility]) 09/08/2019    MCTD (mixed connective tissue disease) ([facility]) 10/12/2018    Raynaud's phenomenon without gangrene 10/12/2018    Hemolytic anemia ([facility]) 11/22/2017       Allergies:Amoxicillin    Current Outpatient Medications Ordered in Epic   Medication Sig Dispense Refill    hydroxychloroquine (PLAQUENIL) 200 MG Tab 400mg (2 tabs) on Monday wed and Friday and 200mg every other day of the week 135 Tablet 1    loratadine (CLARITIN) 10 MG Tab Take 1 Tablet by mouth every day. 90 Tablet 3    acetaminophen (TYLENOL) 325 MG Tab Take 2 Tablets by mouth every four hours as needed for Mild Pain.       No current Epic-ordered facility-administered medications on file.       Past Medical History:   Diagnosis Date    Alopecia     Anemia     Mixed connective tissue disease ([facility])     Raynaud phenomenon      "SLE (systemic lupus erythematosus) (HCC)     TTP (thrombotic thrombocytopenic purpura) (HCC)     TTP (thrombotic thrombocytopenic purpura) (HCC)     Vitiligo        Social History     Tobacco Use    Smoking status: Never    Smokeless tobacco: Never   Vaping Use    Vaping Use: Never used   Substance Use Topics    Alcohol use: No    Drug use: No       Family Status   Relation Name Status    Fa  (Not Specified)    Neg Hx  (Not Specified)     Family History   Problem Relation Age of Onset    Hyperlipidemia Father     Cancer Neg Hx     Diabetes Neg Hx     Heart Disease Neg Hx     Stroke Neg Hx        ROS:  Review of Systems   Constitutional: Positive for tactile fever and chills today.  Negative for malaise, and fatigue.   HENT: Positive for sore throat, recent dental surgery.  Negative for ear pain, nosebleeds, congestion, and neck pain.    Eyes: Negative for vision changes.   Neuro: Negative for headache, sensory changes, weakness, seizure, LOC.   Cardiovascular: Negative for chest pain, palpitations, orthopnea and leg swelling.   Respiratory: Negative for cough, sputum production, shortness of breath and wheezing.   Gastrointestinal: Negative for abdominal pain, nausea, vomiting or diarrhea.   Genitourinary: Positive for dysuria.   Negative for flank pain, urgency and frequency.  Musculoskeletal: Negative for falls, neck pain, back pain, joint pain, myalgias.   Skin: Negative for rash, diaphoresis.     Exam:  BP 90/60 (BP Location: Right arm, Patient Position: Sitting, BP Cuff Size: Small adult)   Pulse 90   Temp 36.8 °C (98.2 °F)   Resp 20   Ht 1.549 m (5' 1\")   Wt 51.6 kg (113 lb 12.8 oz)   SpO2 97%   General: well-nourished, well-developed female in NAD  Head: normocephalic, atraumatic  Eyes: PERRLA, no conjunctival injection, acuity grossly intact, lids normal.  Ears: normal shape and symmetry, no tenderness, no discharge. External canals are without any significant edema or erythema. Tympanic membranes are " without any inflammation, no effusion. Gross auditory acuity is intact.  Nose: symmetrical without tenderness, no discharge.  Mouth/Throat: reasonable hygiene, + erythema, without exudates or tonsillar enlargement.  There are 2 areas to mouth with recent wisdom teeth removal, no surrounding gumline erythema, patient reports improvement.  Neck: no masses, range of motion within normal limits, no tracheal deviation. No obvious thyroid enlargement.   Lymph: + cervical adenopathy. No supraclavicular adenopathy.   Neuro: alert and oriented. Cranial nerves 1-12 grossly intact. No sensory deficit.   Cardiovascular: regular rate and rhythm. No edema.  Pulmonary: no distress. Chest is symmetrical with respiration, no wheezes, crackles, or rhonchi.   Abdomen: soft, non-tender, no guarding, no hepatosplenomegaly.  No CVA tenderness.  Musculoskeletal: no clubbing, appropriate muscle tone, gait is stable.  Skin: warm, dry, intact, no clubbing, no cyanosis, no rashes.   Psych: appropriate mood, affect, judgement.         Lab Results/POC Test Results   Results for orders placed or performed in visit on 02/27/24   POCT Pregnancy   Result Value Ref Range    POC Urine Pregnancy Test Negative     Internal Control Positive Positive     Internal Control Negative Negative    POCT Urinalysis   Result Value Ref Range    POC Color Yellow Negative    POC Appearance Cloudy Negative    POC Glucose Negative Negative mg/dL    POC Bilirubin Negative Negative mg/dL    POC Ketones Negative Negative mg/dL    POC Specific Gravity 1.020     POC Blood Small Negative    POC Urine PH 7.0 5.0 - 8.0    POC Protein Negative Negative mg/dL    POC Urobiligen 0.2 mg/dL    POC Nitrites Negative Negative    POC Leukocyte Esterase Trace Negative   POCT CoV-2, Flu A/B, RSV by PCR   Result Value Ref Range    SARS-CoV-2 by PCR Negative Negative, Invalid    Influenza virus A RNA Negative Negative, Invalid    Influenza virus B, PCR Negative Negative, Invalid    RSV,  PCR Negative Negative, Invalid   POCT GROUP A STREP, PCR   Result Value Ref Range    POC Group A Strep, PCR Not Detected Not Detected, Invalid               Assessment/Plan:  1. Viral pharyngitis  POCT CoV-2, Flu A/B, RSV by PCR    POCT GROUP A STREP, PCR      2. Dysuria  POCT Pregnancy    POCT Urinalysis    URINE CULTURE(NEW)            The patient is a pleasant 23-year-old who is well-appearing who presents with concerns of tactile fever and chills today.  She does endorse sore throat for the last 4 days.  Strep test in clinic is negative.  Viral panel also negative.  She does report recent dental surgery, 2 wisdom teeth removal, she had an evaluation by her dentist yesterday with no concerns.  Do not suspect infectious process at this point.  Furthermore, patient does report 1 episode of dysuria today but has since passed.  She denies any other urinary symptoms.  Urine is positive for leukocytes, blood, protein but states that she currently is on her menses.  Urine will be sent for culture.  At this point do suspect viral pharyngitis as causation of her symptoms.  She is encouraged to use over-the-counter ibuprofen and Tylenol as well as increase fluid intake.  Supportive care, differential diagnoses, and indications for immediate follow-up discussed with patient.   Pathogenesis of diagnosis discussed including typical length and natural progression.   Instructed to return to clinic or nearest emergency department for any change in condition, further concerns, or worsening of symptoms.  Patient states understanding of the plan of care and discharge instructions.  Instructed to make an appointment, for follow up, with her primary care provider.        Please note that this dictation was created using voice recognition software. I have made every reasonable attempt to correct obvious errors, but I expect that there are errors of grammar and possibly content that I did not discover before finalizing the  note.      RED Rodriguez.

## 2024-03-01 LAB
BACTERIA UR CULT: NORMAL
SIGNIFICANT IND 70042: NORMAL
SITE SITE: NORMAL
SOURCE SOURCE: NORMAL

## 2024-03-02 ENCOUNTER — APPOINTMENT (OUTPATIENT)
Dept: RADIOLOGY | Facility: MEDICAL CENTER | Age: 24
DRG: 872 | End: 2024-03-02
Attending: STUDENT IN AN ORGANIZED HEALTH CARE EDUCATION/TRAINING PROGRAM
Payer: MEDICAID

## 2024-03-02 ENCOUNTER — HOSPITAL ENCOUNTER (INPATIENT)
Facility: MEDICAL CENTER | Age: 24
LOS: 1 days | DRG: 872 | End: 2024-03-03
Attending: STUDENT IN AN ORGANIZED HEALTH CARE EDUCATION/TRAINING PROGRAM | Admitting: FAMILY MEDICINE
Payer: MEDICAID

## 2024-03-02 DIAGNOSIS — A41.9 SEPSIS, DUE TO UNSPECIFIED ORGANISM, UNSPECIFIED WHETHER ACUTE ORGAN DYSFUNCTION PRESENT (HCC): ICD-10-CM

## 2024-03-02 DIAGNOSIS — R94.31 ST SEGMENT DEPRESSION: ICD-10-CM

## 2024-03-02 PROBLEM — R21 RASH AND NONSPECIFIC SKIN ERUPTION: Status: ACTIVE | Noted: 2024-03-02

## 2024-03-02 LAB
ALBUMIN SERPL BCP-MCNC: 3.1 G/DL (ref 3.2–4.9)
ALBUMIN/GLOB SERPL: 1.2 G/DL
ALP SERPL-CCNC: 71 U/L (ref 30–99)
ALT SERPL-CCNC: 189 U/L (ref 2–50)
ANION GAP SERPL CALC-SCNC: 11 MMOL/L (ref 7–16)
APPEARANCE UR: ABNORMAL
APTT PPP: 23.6 SEC (ref 24.7–36)
AST SERPL-CCNC: 297 U/L (ref 12–45)
B PARAP IS1001 DNA NPH QL NAA+NON-PROBE: NOT DETECTED
B PERT.PT PRMT NPH QL NAA+NON-PROBE: NOT DETECTED
BACTERIA #/AREA URNS HPF: NEGATIVE /HPF
BASOPHILS # BLD AUTO: 0.1 % (ref 0–1.8)
BASOPHILS # BLD: 0.01 K/UL (ref 0–0.12)
BILIRUB SERPL-MCNC: 0.3 MG/DL (ref 0.1–1.5)
BILIRUB UR QL STRIP.AUTO: NEGATIVE
BUN SERPL-MCNC: 13 MG/DL (ref 8–22)
C PNEUM DNA NPH QL NAA+NON-PROBE: NOT DETECTED
CALCIUM ALBUM COR SERPL-MCNC: 8 MG/DL (ref 8.5–10.5)
CALCIUM SERPL-MCNC: 7.3 MG/DL (ref 8.5–10.5)
CHLORIDE SERPL-SCNC: 112 MMOL/L (ref 96–112)
CO2 SERPL-SCNC: 18 MMOL/L (ref 20–33)
COLOR UR: YELLOW
CREAT SERPL-MCNC: 0.54 MG/DL (ref 0.5–1.4)
EKG IMPRESSION: NORMAL
EOSINOPHIL # BLD AUTO: 0.03 K/UL (ref 0–0.51)
EOSINOPHIL NFR BLD: 0.3 % (ref 0–6.9)
EPI CELLS #/AREA URNS HPF: NORMAL /HPF
ERYTHROCYTE [DISTWIDTH] IN BLOOD BY AUTOMATED COUNT: 39 FL (ref 35.9–50)
FLUAV RNA NPH QL NAA+NON-PROBE: NOT DETECTED
FLUAV RNA SPEC QL NAA+PROBE: NEGATIVE
FLUBV RNA NPH QL NAA+NON-PROBE: NOT DETECTED
FLUBV RNA SPEC QL NAA+PROBE: NEGATIVE
GFR SERPLBLD CREATININE-BSD FMLA CKD-EPI: 132 ML/MIN/1.73 M 2
GLOBULIN SER CALC-MCNC: 2.6 G/DL (ref 1.9–3.5)
GLUCOSE SERPL-MCNC: 108 MG/DL (ref 65–99)
GLUCOSE UR STRIP.AUTO-MCNC: NEGATIVE MG/DL
HADV DNA NPH QL NAA+NON-PROBE: NOT DETECTED
HCG SERPL QL: NEGATIVE
HCOV 229E RNA NPH QL NAA+NON-PROBE: DETECTED
HCOV HKU1 RNA NPH QL NAA+NON-PROBE: NOT DETECTED
HCOV NL63 RNA NPH QL NAA+NON-PROBE: NOT DETECTED
HCOV OC43 RNA NPH QL NAA+NON-PROBE: NOT DETECTED
HCT VFR BLD AUTO: 31.6 % (ref 37–47)
HGB BLD-MCNC: 10.6 G/DL (ref 12–16)
HMPV RNA NPH QL NAA+NON-PROBE: NOT DETECTED
HPIV1 RNA NPH QL NAA+NON-PROBE: NOT DETECTED
HPIV2 RNA NPH QL NAA+NON-PROBE: NOT DETECTED
HPIV3 RNA NPH QL NAA+NON-PROBE: NOT DETECTED
HPIV4 RNA NPH QL NAA+NON-PROBE: NOT DETECTED
HYALINE CASTS #/AREA URNS LPF: NORMAL /LPF
IMM GRANULOCYTES # BLD AUTO: 0.03 K/UL (ref 0–0.11)
IMM GRANULOCYTES NFR BLD AUTO: 0.3 % (ref 0–0.9)
INR PPP: 1.16 (ref 0.87–1.13)
KETONES UR STRIP.AUTO-MCNC: ABNORMAL MG/DL
LACTATE SERPL-SCNC: 1.1 MMOL/L (ref 0.5–2)
LACTATE SERPL-SCNC: 1.1 MMOL/L (ref 0.5–2)
LACTATE SERPL-SCNC: 1.3 MMOL/L (ref 0.5–2)
LACTATE SERPL-SCNC: 2.7 MMOL/L (ref 0.5–2)
LACTATE SERPL-SCNC: 4 MMOL/L (ref 0.5–2)
LEUKOCYTE ESTERASE UR QL STRIP.AUTO: ABNORMAL
LYMPHOCYTES # BLD AUTO: 0.18 K/UL (ref 1–4.8)
LYMPHOCYTES NFR BLD: 1.9 % (ref 22–41)
M PNEUMO DNA NPH QL NAA+NON-PROBE: NOT DETECTED
MCH RBC QN AUTO: 27.9 PG (ref 27–33)
MCHC RBC AUTO-ENTMCNC: 33.5 G/DL (ref 32.2–35.5)
MCV RBC AUTO: 83.2 FL (ref 81.4–97.8)
MICRO URNS: ABNORMAL
MONOCYTES # BLD AUTO: 0.32 K/UL (ref 0–0.85)
MONOCYTES NFR BLD AUTO: 3.5 % (ref 0–13.4)
MUCOUS THREADS #/AREA URNS HPF: NORMAL /HPF
NEUTROPHILS # BLD AUTO: 8.67 K/UL (ref 1.82–7.42)
NEUTROPHILS NFR BLD: 93.9 % (ref 44–72)
NITRITE UR QL STRIP.AUTO: NEGATIVE
NRBC # BLD AUTO: 0 K/UL
NRBC BLD-RTO: 0 /100 WBC (ref 0–0.2)
PH UR STRIP.AUTO: 6 [PH] (ref 5–8)
PLATELET # BLD AUTO: 216 K/UL (ref 164–446)
PMV BLD AUTO: 10 FL (ref 9–12.9)
POTASSIUM SERPL-SCNC: 3.1 MMOL/L (ref 3.6–5.5)
PROT SERPL-MCNC: 5.7 G/DL (ref 6–8.2)
PROT UR QL STRIP: NEGATIVE MG/DL
PROTHROMBIN TIME: 15 SEC (ref 12–14.6)
RBC # BLD AUTO: 3.8 M/UL (ref 4.2–5.4)
RBC # URNS HPF: NORMAL /HPF
RBC UR QL AUTO: ABNORMAL
RSV RNA NPH QL NAA+NON-PROBE: NOT DETECTED
RSV RNA SPEC QL NAA+PROBE: NEGATIVE
RV+EV RNA NPH QL NAA+NON-PROBE: NOT DETECTED
S PYO DNA SPEC NAA+PROBE: NOT DETECTED
SARS-COV-2 RNA NPH QL NAA+NON-PROBE: NOTDETECTED
SARS-COV-2 RNA RESP QL NAA+PROBE: NOTDETECTED
SODIUM SERPL-SCNC: 141 MMOL/L (ref 135–145)
SP GR UR STRIP.AUTO: 1.02
TROPONIN T SERPL-MCNC: <6 NG/L (ref 6–19)
UROBILINOGEN UR STRIP.AUTO-MCNC: 0.2 MG/DL
WBC # BLD AUTO: 9.2 K/UL (ref 4.8–10.8)
WBC #/AREA URNS HPF: NORMAL /HPF

## 2024-03-02 PROCEDURE — 87633 RESP VIRUS 12-25 TARGETS: CPT

## 2024-03-02 PROCEDURE — 83605 ASSAY OF LACTIC ACID: CPT

## 2024-03-02 PROCEDURE — 0241U HCHG SARS-COV-2 COVID-19 NFCT DS RESP RNA 4 TRGT ED POC: CPT

## 2024-03-02 PROCEDURE — 700105 HCHG RX REV CODE 258: Performed by: STUDENT IN AN ORGANIZED HEALTH CARE EDUCATION/TRAINING PROGRAM

## 2024-03-02 PROCEDURE — 71045 X-RAY EXAM CHEST 1 VIEW: CPT

## 2024-03-02 PROCEDURE — 700111 HCHG RX REV CODE 636 W/ 250 OVERRIDE (IP): Mod: JZ

## 2024-03-02 PROCEDURE — 80053 COMPREHEN METABOLIC PANEL: CPT

## 2024-03-02 PROCEDURE — 85730 THROMBOPLASTIN TIME PARTIAL: CPT

## 2024-03-02 PROCEDURE — 700102 HCHG RX REV CODE 250 W/ 637 OVERRIDE(OP): Performed by: STUDENT IN AN ORGANIZED HEALTH CARE EDUCATION/TRAINING PROGRAM

## 2024-03-02 PROCEDURE — 99285 EMERGENCY DEPT VISIT HI MDM: CPT

## 2024-03-02 PROCEDURE — 96374 THER/PROPH/DIAG INJ IV PUSH: CPT

## 2024-03-02 PROCEDURE — 700117 HCHG RX CONTRAST REV CODE 255: Performed by: STUDENT IN AN ORGANIZED HEALTH CARE EDUCATION/TRAINING PROGRAM

## 2024-03-02 PROCEDURE — 700102 HCHG RX REV CODE 250 W/ 637 OVERRIDE(OP)

## 2024-03-02 PROCEDURE — 96375 TX/PRO/DX INJ NEW DRUG ADDON: CPT

## 2024-03-02 PROCEDURE — 81001 URINALYSIS AUTO W/SCOPE: CPT

## 2024-03-02 PROCEDURE — 87651 STREP A DNA AMP PROBE: CPT

## 2024-03-02 PROCEDURE — 85610 PROTHROMBIN TIME: CPT

## 2024-03-02 PROCEDURE — A9270 NON-COVERED ITEM OR SERVICE: HCPCS | Performed by: STUDENT IN AN ORGANIZED HEALTH CARE EDUCATION/TRAINING PROGRAM

## 2024-03-02 PROCEDURE — 85025 COMPLETE CBC W/AUTO DIFF WBC: CPT

## 2024-03-02 PROCEDURE — 770001 HCHG ROOM/CARE - MED/SURG/GYN PRIV*

## 2024-03-02 PROCEDURE — 36415 COLL VENOUS BLD VENIPUNCTURE: CPT

## 2024-03-02 PROCEDURE — 99222 1ST HOSP IP/OBS MODERATE 55: CPT | Mod: GC | Performed by: FAMILY MEDICINE

## 2024-03-02 PROCEDURE — A9270 NON-COVERED ITEM OR SERVICE: HCPCS

## 2024-03-02 PROCEDURE — 87581 M.PNEUMON DNA AMP PROBE: CPT

## 2024-03-02 PROCEDURE — 84484 ASSAY OF TROPONIN QUANT: CPT

## 2024-03-02 PROCEDURE — 87040 BLOOD CULTURE FOR BACTERIA: CPT

## 2024-03-02 PROCEDURE — 700111 HCHG RX REV CODE 636 W/ 250 OVERRIDE (IP): Mod: JZ | Performed by: STUDENT IN AN ORGANIZED HEALTH CARE EDUCATION/TRAINING PROGRAM

## 2024-03-02 PROCEDURE — 87798 DETECT AGENT NOS DNA AMP: CPT | Mod: 91

## 2024-03-02 PROCEDURE — 87486 CHLMYD PNEUM DNA AMP PROBE: CPT

## 2024-03-02 PROCEDURE — 93005 ELECTROCARDIOGRAM TRACING: CPT | Performed by: STUDENT IN AN ORGANIZED HEALTH CARE EDUCATION/TRAINING PROGRAM

## 2024-03-02 PROCEDURE — 84703 CHORIONIC GONADOTROPIN ASSAY: CPT

## 2024-03-02 PROCEDURE — 700105 HCHG RX REV CODE 258

## 2024-03-02 PROCEDURE — 70487 CT MAXILLOFACIAL W/DYE: CPT

## 2024-03-02 RX ORDER — SODIUM CHLORIDE 9 MG/ML
INJECTION, SOLUTION INTRAVENOUS ONCE
Status: DISCONTINUED | OUTPATIENT
Start: 2024-03-02 | End: 2024-03-02

## 2024-03-02 RX ORDER — ACETAMINOPHEN 500 MG
1000 TABLET ORAL ONCE
Status: COMPLETED | OUTPATIENT
Start: 2024-03-02 | End: 2024-03-02

## 2024-03-02 RX ORDER — PREDNISONE 10 MG/1
30 TABLET ORAL DAILY
Status: COMPLETED | OUTPATIENT
Start: 2024-03-02 | End: 2024-03-03

## 2024-03-02 RX ORDER — ENOXAPARIN SODIUM 100 MG/ML
40 INJECTION SUBCUTANEOUS DAILY
Status: DISCONTINUED | OUTPATIENT
Start: 2024-03-02 | End: 2024-03-03 | Stop reason: HOSPADM

## 2024-03-02 RX ORDER — PROMETHAZINE HYDROCHLORIDE 25 MG/1
12.5-25 SUPPOSITORY RECTAL EVERY 4 HOURS PRN
Status: DISCONTINUED | OUTPATIENT
Start: 2024-03-02 | End: 2024-03-03 | Stop reason: HOSPADM

## 2024-03-02 RX ORDER — ONDANSETRON 4 MG/1
4 TABLET, ORALLY DISINTEGRATING ORAL EVERY 4 HOURS PRN
Status: DISCONTINUED | OUTPATIENT
Start: 2024-03-02 | End: 2024-03-03 | Stop reason: HOSPADM

## 2024-03-02 RX ORDER — HYDROXYCHLOROQUINE SULFATE 200 MG/1
200 TABLET, FILM COATED ORAL
Status: DISCONTINUED | OUTPATIENT
Start: 2024-03-02 | End: 2024-03-03 | Stop reason: HOSPADM

## 2024-03-02 RX ORDER — HYDROXYCHLOROQUINE SULFATE 200 MG/1
400 TABLET, FILM COATED ORAL
Status: DISCONTINUED | OUTPATIENT
Start: 2024-03-04 | End: 2024-03-03 | Stop reason: HOSPADM

## 2024-03-02 RX ORDER — CEFTRIAXONE 2 G/1
2000 INJECTION, POWDER, FOR SOLUTION INTRAMUSCULAR; INTRAVENOUS ONCE
Status: COMPLETED | OUTPATIENT
Start: 2024-03-02 | End: 2024-03-02

## 2024-03-02 RX ORDER — LABETALOL HYDROCHLORIDE 5 MG/ML
10 INJECTION, SOLUTION INTRAVENOUS EVERY 4 HOURS PRN
Status: DISCONTINUED | OUTPATIENT
Start: 2024-03-02 | End: 2024-03-03 | Stop reason: HOSPADM

## 2024-03-02 RX ORDER — GUAIFENESIN/DEXTROMETHORPHAN 100-10MG/5
10 SYRUP ORAL EVERY 6 HOURS PRN
Status: DISCONTINUED | OUTPATIENT
Start: 2024-03-02 | End: 2024-03-03 | Stop reason: HOSPADM

## 2024-03-02 RX ORDER — DIPHENHYDRAMINE HYDROCHLORIDE 50 MG/ML
25 INJECTION INTRAMUSCULAR; INTRAVENOUS EVERY 6 HOURS PRN
Status: DISCONTINUED | OUTPATIENT
Start: 2024-03-02 | End: 2024-03-02

## 2024-03-02 RX ORDER — ACETAMINOPHEN 325 MG/1
650 TABLET ORAL EVERY 6 HOURS PRN
Status: DISCONTINUED | OUTPATIENT
Start: 2024-03-02 | End: 2024-03-03 | Stop reason: HOSPADM

## 2024-03-02 RX ORDER — PROCHLORPERAZINE EDISYLATE 5 MG/ML
5-10 INJECTION INTRAMUSCULAR; INTRAVENOUS EVERY 4 HOURS PRN
Status: DISCONTINUED | OUTPATIENT
Start: 2024-03-02 | End: 2024-03-03 | Stop reason: HOSPADM

## 2024-03-02 RX ORDER — DIPHENHYDRAMINE HYDROCHLORIDE 50 MG/ML
25 INJECTION INTRAMUSCULAR; INTRAVENOUS EVERY 4 HOURS PRN
Status: DISCONTINUED | OUTPATIENT
Start: 2024-03-02 | End: 2024-03-03 | Stop reason: HOSPADM

## 2024-03-02 RX ORDER — SODIUM CHLORIDE 9 MG/ML
INJECTION, SOLUTION INTRAVENOUS ONCE
Status: COMPLETED | OUTPATIENT
Start: 2024-03-02 | End: 2024-03-02

## 2024-03-02 RX ORDER — IBUPROFEN 800 MG/1
800 TABLET ORAL EVERY 8 HOURS PRN
Status: DISCONTINUED | OUTPATIENT
Start: 2024-03-02 | End: 2024-03-03 | Stop reason: HOSPADM

## 2024-03-02 RX ORDER — PROCHLORPERAZINE EDISYLATE 5 MG/ML
10 INJECTION INTRAMUSCULAR; INTRAVENOUS ONCE
Status: COMPLETED | OUTPATIENT
Start: 2024-03-02 | End: 2024-03-02

## 2024-03-02 RX ORDER — SODIUM CHLORIDE, SODIUM LACTATE, POTASSIUM CHLORIDE, CALCIUM CHLORIDE 600; 310; 30; 20 MG/100ML; MG/100ML; MG/100ML; MG/100ML
INJECTION, SOLUTION INTRAVENOUS CONTINUOUS
Status: DISCONTINUED | OUTPATIENT
Start: 2024-03-02 | End: 2024-03-03

## 2024-03-02 RX ORDER — PROMETHAZINE HYDROCHLORIDE 25 MG/1
12.5-25 TABLET ORAL EVERY 4 HOURS PRN
Status: DISCONTINUED | OUTPATIENT
Start: 2024-03-02 | End: 2024-03-03 | Stop reason: HOSPADM

## 2024-03-02 RX ORDER — IBUPROFEN 800 MG/1
800 TABLET ORAL EVERY 8 HOURS PRN
COMMUNITY
Start: 2024-02-16

## 2024-03-02 RX ORDER — ONDANSETRON 2 MG/ML
4 INJECTION INTRAMUSCULAR; INTRAVENOUS EVERY 4 HOURS PRN
Status: DISCONTINUED | OUTPATIENT
Start: 2024-03-02 | End: 2024-03-03 | Stop reason: HOSPADM

## 2024-03-02 RX ORDER — CLINDAMYCIN HYDROCHLORIDE 300 MG/1
300 CAPSULE ORAL EVERY 8 HOURS
Status: ON HOLD | COMMUNITY
Start: 2024-02-16 | End: 2024-03-03

## 2024-03-02 RX ORDER — CHLORHEXIDINE GLUCONATE ORAL RINSE 1.2 MG/ML
15 SOLUTION DENTAL 2 TIMES DAILY
COMMUNITY
Start: 2024-02-16

## 2024-03-02 RX ADMIN — PROCHLORPERAZINE EDISYLATE 10 MG: 5 INJECTION INTRAMUSCULAR; INTRAVENOUS at 04:07

## 2024-03-02 RX ADMIN — DIPHENHYDRAMINE HYDROCHLORIDE 25 MG: 50 INJECTION, SOLUTION INTRAMUSCULAR; INTRAVENOUS at 21:59

## 2024-03-02 RX ADMIN — PREDNISONE 30 MG: 10 TABLET ORAL at 17:37

## 2024-03-02 RX ADMIN — DIPHENHYDRAMINE HYDROCHLORIDE 25 MG: 50 INJECTION, SOLUTION INTRAMUSCULAR; INTRAVENOUS at 08:47

## 2024-03-02 RX ADMIN — DIPHENHYDRAMINE HYDROCHLORIDE 25 MG: 50 INJECTION, SOLUTION INTRAMUSCULAR; INTRAVENOUS at 15:35

## 2024-03-02 RX ADMIN — CEFTRIAXONE SODIUM 2000 MG: 2 INJECTION, POWDER, FOR SOLUTION INTRAMUSCULAR; INTRAVENOUS at 04:52

## 2024-03-02 RX ADMIN — SODIUM CHLORIDE, POTASSIUM CHLORIDE, SODIUM LACTATE AND CALCIUM CHLORIDE: 600; 310; 30; 20 INJECTION, SOLUTION INTRAVENOUS at 08:10

## 2024-03-02 RX ADMIN — IOHEXOL 70 ML: 350 INJECTION, SOLUTION INTRAVENOUS at 06:00

## 2024-03-02 RX ADMIN — SODIUM CHLORIDE, POTASSIUM CHLORIDE, SODIUM LACTATE AND CALCIUM CHLORIDE: 600; 310; 30; 20 INJECTION, SOLUTION INTRAVENOUS at 17:41

## 2024-03-02 RX ADMIN — SODIUM CHLORIDE: 9 INJECTION, SOLUTION INTRAVENOUS at 04:10

## 2024-03-02 RX ADMIN — ENOXAPARIN SODIUM 40 MG: 100 INJECTION SUBCUTANEOUS at 17:36

## 2024-03-02 RX ADMIN — HYDROXYCHLOROQUINE SULFATE 200 MG: 200 TABLET ORAL at 09:39

## 2024-03-02 RX ADMIN — ACETAMINOPHEN 1000 MG: 500 TABLET ORAL at 04:07

## 2024-03-02 ASSESSMENT — COGNITIVE AND FUNCTIONAL STATUS - GENERAL
MOBILITY SCORE: 24
SUGGESTED CMS G CODE MODIFIER DAILY ACTIVITY: CH
SUGGESTED CMS G CODE MODIFIER MOBILITY: CH
DAILY ACTIVITIY SCORE: 24

## 2024-03-02 ASSESSMENT — PAIN DESCRIPTION - PAIN TYPE
TYPE: ACUTE PAIN

## 2024-03-02 ASSESSMENT — PATIENT HEALTH QUESTIONNAIRE - PHQ9
SUM OF ALL RESPONSES TO PHQ9 QUESTIONS 1 AND 2: 0
1. LITTLE INTEREST OR PLEASURE IN DOING THINGS: NOT AT ALL
2. FEELING DOWN, DEPRESSED, IRRITABLE, OR HOPELESS: NOT AT ALL

## 2024-03-02 NOTE — ED NOTES
Pt wheeled back to room  Green 23 assisted by ED blake Gray able to transfer to hospital bed with steady gait. Pt placed into gown and placed on the monitor.  Pt states same complaints  from triage.  Noted Pt with active vomiting. Chart up for ERP to see.

## 2024-03-02 NOTE — ED NOTES
Naty from Lab called with critical result of Lactic Acid -4.0 at 0444. Critical lab result read back to Naty.   Dr. Lott notified of critical lab result at 0444 via voalte.

## 2024-03-02 NOTE — PROGRESS NOTES
Respiratory panel came back positive for coronavirus. Isolation required. PT must transfer to [facility]. Report given to, Jenny HOPSON.     Noticed increase redness spreading up her arms. Gave second dose of Benadryl and notified MD.

## 2024-03-02 NOTE — H&P
FAMILY MEDICINE HISTORY AND PHYSICAL       PATIENT ID:  NAME:  Estelle Pappas  MRN:               [id number]  YOB: 2000    Date of Admission: 3/2/2024     Attending: Luz Pabon M.d.     Resident: Cam Barboza M.D. (PGY-1)    Primary Care Physician:  Kevyn Leal M.D.    CC:    Chief Complaint   Patient presents with    Numbness     Woke up with numbness and tingling in all extremities    Headache    Epigastric Pain       HPI: Estelle Pappas is a 23 y.o. female with hx of SLE and recent wisdom tooth extraction who presented with increasing fevers and headaches over the last two days.  She was seen at urgent care  earlier this past week, and was diagnosed with a viral pharyngitis and discharged home.  Since that time, she feels her symptoms initially improved, but now feels they are getting worse.  Her wisdom teeth were removed 2 weeks ago.  She saw her surgeon 2 days ago, and they stated they saw no sign of infection in her oral cavity.    She has a history of SLE diagnosed 7 years ago, at which time the patient says she was admitted to [facility] for treatment of a bacterial infection as well as TTP.      ERCourse:  Pt presented to ED with above symptoms.  Found to be febrile, tachycardic.  CXR unremarkable, CT maxillofacial shows no evidence of infection or other acute process following wisdom tooth removal.  UA with trace ketones, small leukocyte esterase, trace blood.  Troponin negative, lactate 4.0.  hCG negative.  Blood cultures x 2 drawn, patient medicated with 1.5 L NS bolus, Tylenol, Compazine, and Rocephin 2 g x 1.  Patient admitted for further evaluation of sepsis.    REVIEW OF SYSTEMS:   Ten systems reviewed and were negative except as noted in the HPI.                PAST MEDICAL HISTORY:   has a past medical history of Alopecia, Anemia, Mixed connective tissue disease (HCC), Raynaud phenomenon, SLE (systemic lupus erythematosus) (HCC), TTP (thrombotic  Patient up to bedside, vital signs and site stable. Patient ambulated to bathroom without difficulty. Patient voided without difficulty. 1410 Discharge instructions and home medications reviewed with patient. Time allowed for questions and answers. Peripheral IV sites dc'd without difficulty with tips intact. 1421 Patient discharged to home with family. thrombocytopenic purpura) (HCC), TTP (thrombotic thrombocytopenic purpura) (HCC), and Vitiligo.     PAST SURGICAL HISTORY:   has a past surgical history that includes cath placement.     FAMILY HISTORY:  family history includes Hyperlipidemia in her father.     SOCIAL HISTORY:   Employment: [employer]  Living Situation: Lives locally with her mother.  Denies any sexual activity.  Smoking: Denies  Etoh: Denies  Recreational Drug: Denies    DIET:   Orders Placed This Encounter   Procedures    Diet Order Diet: Regular     Standing Status:   Standing     Number of Occurrences:   1     Order Specific Question:   Diet:     Answer:   Regular [1]       ALLERGIES:  Allergies   Allergen Reactions    Amoxicillin Hives, Itching and Swelling     Patient stated       OUTPATIENT MEDICATIONS:    Current Facility-Administered Medications:     [START ON 3/3/2024] cefTRIAXone (Rocephin) syringe 2,000 mg, 2,000 mg, Intravenous, DAILY, Cam Barboza M.D.    lactated ringers infusion, , Intravenous, Continuous, Cam Barboza M.D.    enoxaparin (Lovenox) inj 40 mg, 40 mg, Subcutaneous, DAILY AT 1800, Cam Barboza M.D.    acetaminophen (Tylenol) tablet 650 mg, 650 mg, Oral, Q6HRS PRN, Cam Barboza M.D.    labetalol (Normodyne/Trandate) injection 10 mg, 10 mg, Intravenous, Q4HRS PRN, Cam Barboza M.D.    ondansetron (Zofran) syringe/vial injection 4 mg, 4 mg, Intravenous, Q4HRS PRN, Cam Barboza M.D.    ondansetron (Zofran ODT) dispertab 4 mg, 4 mg, Oral, Q4HRS PRN, Cam Barboza M.D.    promethazine (Phenergan) tablet 12.5-25 mg, 12.5-25 mg, Oral, Q4HRS PRN, Cam Barboza M.D.    promethazine (Phenergan) suppository 12.5-25 mg, 12.5-25 mg, Rectal, Q4HRS PRN, Cam Barboza M.D.    prochlorperazine (Compazine) injection 5-10 mg, 5-10 mg, Intravenous, Q4HRS PRN, Cam Barboza M.D.    guaiFENesin dextromethorphan (Robitussin DM) 100-10 MG/5ML syrup 10 mL, 10 mL, Oral, Q6HRS PRN, Cam Barboza M.D.    Current Outpatient  Medications:     hydroxychloroquine (PLAQUENIL) 200 MG Tab, 400mg (2 tabs) on  and Friday and 200mg every other day of the week, Disp: 135 Tablet, Rfl: 1    loratadine (CLARITIN) 10 MG Tab, Take 1 Tablet by mouth every day., Disp: 90 Tablet, Rfl: 3    acetaminophen (TYLENOL) 325 MG Tab, Take 2 Tablets by mouth every four hours as needed for Mild Pain., Disp: , Rfl:     PHYSICAL EXAM:  Vitals:    24 0305 24 0420 24 0520 24 0605   BP: 110/54 94/53 90/53 91/52   Pulse: (!) 107 (!) 118 (!) 121 (!) 117   Resp: 20 18 20 20   Temp: (!) 38.3 °C (101 °F) (!) 38.4 °C (101.1 °F) 37.8 °C (100.1 °F) 37.9 °C (100.2 °F)   TempSrc: Temporal Temporal Temporal Temporal   SpO2: 98% 98% 96% 96%   Weight:       Height:       , Temp (24hrs), Av °C (100.4 °F), Min:37.5 °C (99.5 °F), Max:38.4 °C (101.1 °F)  , Pulse Oximetry: 96 %, O2 (LPM): 0, O2 Delivery Device: None - Room Air    Physical Exam  Vitals reviewed.   Constitutional:       General: She is not in acute distress.     Appearance: Normal appearance. She is not toxic-appearing.      Comments: Young female lying in hospital bed, tired appearing.  Malar rash on face, bilateral hands.   HENT:      Head: Normocephalic and atraumatic.      Right Ear: External ear normal.      Left Ear: External ear normal.      Nose: Nose normal. No congestion.      Mouth/Throat:      Mouth: Mucous membranes are moist.      Pharynx: Oropharynx is clear. No oropharyngeal exudate or posterior oropharyngeal erythema.   Eyes:      Extraocular Movements: Extraocular movements intact.      Pupils: Pupils are equal, round, and reactive to light.   Cardiovascular:      Rate and Rhythm: Normal rate and regular rhythm.      Heart sounds: No murmur heard.  Pulmonary:      Effort: Pulmonary effort is normal. No respiratory distress.      Breath sounds: Normal breath sounds.   Abdominal:      General: Abdomen is flat. Bowel sounds are normal. There is no distension.       Palpations: Abdomen is soft.      Tenderness: There is no abdominal tenderness. There is no guarding or rebound.   Musculoskeletal:         General: Swelling (Bilateral hands) present. No deformity. Normal range of motion.      Cervical back: Normal range of motion and neck supple.   Skin:     General: Skin is warm and dry.      Capillary Refill: Capillary refill takes less than 2 seconds.      Findings: Rash (Macular rash, face, hands, upper legs) present.   Neurological:      General: No focal deficit present.      Mental Status: She is alert and oriented to person, place, and time.      Cranial Nerves: No cranial nerve deficit.   Psychiatric:         Mood and Affect: Mood normal.         Behavior: Behavior normal.            LAB TESTS:   Results for orders placed or performed during the hospital encounter of 03/02/24   HCG QUAL Serum   Result Value Ref Range    Beta-Hcg Qualitative Serum Negative Negative   Troponin   Result Value Ref Range    Troponin T <6 6 - 19 ng/L   LACTIC ACID   Result Value Ref Range    Lactic Acid 4.0 (HH) 0.5 - 2.0 mmol/L   URINALYSIS    Specimen: Urine   Result Value Ref Range    Color Yellow     Character Cloudy (A)     Specific Gravity 1.024 <1.035    Ph 6.0 5.0 - 8.0    Glucose Negative Negative mg/dL    Ketones Trace (A) Negative mg/dL    Protein Negative Negative mg/dL    Bilirubin Negative Negative    Urobilinogen, Urine 0.2 Negative    Nitrite Negative Negative    Leukocyte Esterase Small (A) Negative    Occult Blood Trace (A) Negative    Micro Urine Req Microscopic    URINE MICROSCOPIC (W/UA)   Result Value Ref Range    WBC 0-2 /hpf    RBC 0-2 /hpf    Bacteria Negative None /hpf    Epithelial Cells Few /hpf    Mucous Threads Few /hpf    Hyaline Cast 0-2 /lpf   Group A Strep by PCR    Specimen: Throat   Result Value Ref Range    Group A Strep by PCR Not Detected Not Detected   EKG   Result Value Ref Range    Report       [facility] Emergency Dept.    Test  "Date:  2024  Pt Name:    BERNARDINO SCOTT       Department: ER  MRN:        [id number]                      Room:        23  Gender:     Female                       Technician: [id number]  :        2000                   Requested By:ZARA HENDRIX  Order #:    [id number]                    Reading MD:    Measurements  Intervals                                Axis  Rate:       111                          P:          49  NY:         136                          QRS:        71  QRSD:       89                           T:          -74  QT:         317  QTc:        431    Interpretive Statements  Sinus tachycardia  Borderline T abnormalities, diffuse leads  No previous ECG available for comparison     POC CoV-2, FLU A/B, RSV by PCR   Result Value Ref Range    POC Influenza A RNA, PCR Negative Negative    POC Influenza B RNA, PCR Negative Negative    POC RSV, by PCR Negative Negative    POC SARS-CoV-2, PCR NotDetected         CULTURES:   Results       Procedure Component Value Units Date/Time    Blood Culture [[id number]] Collected: 24 045    Order Status: Sent Specimen: Blood from Peripheral Updated: 24 0530    Narrative:      Per Hospital Policy: Only change Specimen Src: to \"Line\" if  specified by physician order.    Group A Strep by PCR [[id number]] Collected: 24    Order Status: Completed Specimen: Throat Updated: 24 0500     Group A Strep by PCR Not Detected    Blood Culture [[id number]] Collected: 24    Order Status: Sent Specimen: Blood from Peripheral Updated: 24 0433    Narrative:      Per Hospital Policy: Only change Specimen Src: to \"Line\" if  specified by physician order.    URINALYSIS [[id number]]  (Abnormal) Collected: 24 0245    Order Status: Completed Specimen: Urine Updated: 24     Color Yellow     Character Cloudy     Specific Gravity 1.024     Ph 6.0     Glucose Negative mg/dL      Ketones Trace mg/dL      Protein " Negative mg/dL      Bilirubin Negative     Urobilinogen, Urine 0.2     Nitrite Negative     Leukocyte Esterase Small     Occult Blood Trace     Micro Urine Req Microscopic            IMAGES:  CT-MAXILLOFACIAL WITH PLUS RECONS   Final Result      No evidence of infection or other acute process following wisdom tooth removal.      DX-CHEST-LIMITED (1 VIEW)   Final Result      No acute process.           CONSULTS:   None    ASSESSMENT/PLAN:   23 y.o. female with hx of SLE and recent wisdom tooth extraction admitted 3/2/2024 with fever, tachycardia, and lactic acidosis concerning for sepsis    I anticipate this patient will require at least two midnights for appropriate medical management, necessitating inpatient admission because of sepsis treatment      * Sepsis (HCC)- (present on admission)  Assessment & Plan  This is Sepsis Present on admission  SIRS criteria identified on my evaluation include: Fever, with temperature greater than 100.9 deg F and Tachycardia, with heart rate greater than 90 BPM  Clinical indicators of end organ dysfunction include Lactic Acid greater than 2  Source is unknown, likely oral.  Sepsis protocol initiated  Crystalloid Fluid Administration: Fluid resuscitation ordered per standard protocol - 30 mL/kg per current or ideal body weight  IV antibiotics as appropriate for source of sepsis  Reassessment: I have reassessed the patient's hemodynamic status    Unclear if etiology of fever and tachycardia is viral versus bacterial.  No sign of abscess or infection on CT maxillofacial.  For now, will continue empiric antibiotics.  Blood cultures pending.  - Continue antibiotics, Ancef instead of ceftriaxone due to development of rash  - Continue IV fluids at 1.5 maintenance until lactate resolved, then maintenance rate  - Ordered extended respiratory panel to evaluate for other causes of fever    Rash and nonspecific skin eruption  Assessment & Plan  Patient with malar rash on face and bilateral  hands, as well as upper legs bilaterally after admission to the floor.  Chart review shows that she was administered Compazine, Rocephin, Tylenol, and IV contrast in the ED prior to the onset of this rash. She denies any difficulty breathing.  - IV Benadryl  - Discussed with inpatient pharmacist, they recommend Ancef as an alternative to ceftriaxone, as it does not have cross-reactivity with ceftriaxone or amoxicillin.  Also recommend prednisone 0.5 mg/kg/day x 3 days  - Ordered Ancef, prednisone as above  - Monitor for progression/resolution of rash  - IV contrast is another possible etiology of the patient's rash    SLE (systemic lupus erythematosus related syndrome) (HCC)- (present on admission)  Assessment & Plan  Diagnosed 7 years ago.  Currently taking Plaquenil 400 mg Monday Wednesday Friday, 200 mg on other days.  - Reordered home Plaquenil  - Lupus is a possible alternative etiology of the patient's rash, though less likely given the concomitant swelling          Core Measures:  Fluids: LR @ 137 mL/hr  Lines: Peripheral IV for intravenous access  Abx: Ancef  Diet: regular diet  PPX: enoxaparin ppx    Disposition  Patient is not medically cleared for discharge.   Anticipate discharge to to home with close outpatient follow-up.  I have placed the appropriate orders for post-discharge needs.    I have personally seen and examined the patient at bedside. I discussed the plan of care with patient, family, bedside RN, pharmacy, and  Luz Pabon M.d..    CODE Status: Full Code      Cam Barboza M.D.   PGY-1  [facility]

## 2024-03-02 NOTE — ED NOTES
Pt medicated per MAR. Throat sample obtained for Grp A Strep sent to lab.    COVID sample in process

## 2024-03-02 NOTE — ASSESSMENT & PLAN NOTE
This is Sepsis Present on admission  SIRS criteria identified on my evaluation include: Fever, with temperature greater than 100.9 deg F and Tachycardia, with heart rate greater than 90 BPM  Clinical indicators of end organ dysfunction include Lactic Acid greater than 2  Source is unknown, likely oral.  Sepsis protocol initiated  Crystalloid Fluid Administration: Fluid resuscitation ordered per standard protocol - 30 mL/kg per current or ideal body weight  IV antibiotics as appropriate for source of sepsis  Reassessment: I have reassessed the patient's hemodynamic status    Unclear if etiology of fever and tachycardia is viral versus bacterial.  No sign of abscess or infection on CT maxillofacial.  For now, will continue empiric antibiotics.  Blood cultures pending.  - Continue antibiotics, Ancef instead of ceftriaxone due to development of rash  - Continue IV fluids at 1.5 maintenance until lactate resolved, then maintenance rate  - Ordered extended respiratory panel to evaluate for other causes of fever

## 2024-03-02 NOTE — ED NOTES
Transport @ bedside.  Pt and family informed and updated with POC.   Pt transferred with all Pts belongings taken.

## 2024-03-02 NOTE — ED NOTES
Pharmacy Medication Reconciliation      ~Medication reconciliation updated and complete per patient at bedside.   ~Allergies have been verified and updated   ~Clindamycin 300mg q 8 hours completed >7 days ago  ~Patient home pharmacy :  [facility]      ~Anticoagulants (rivaroxaban, apixaban, edoxaban, dabigatran, warfarin, enoxaparin) taken in the last 14 days? NO  ~Anticoagulant: N/A Last dose: N/A

## 2024-03-02 NOTE — ED TRIAGE NOTES
"Chief Complaint   Patient presents with    Numbness     Woke up with numbness and tingling in all extremities    Headache    Epigastric Pain     Patient ambulatory to triage for the above complaints. Patient states that she took an Ibuprofen before bed for dental pain, had wisdom teeth removed recently, and then she woke up with all of the above symptoms. Patient recently diagnosed with a viral infection and has a history of Lupus. Patient took a Claritin before bed and then again when she woke up as she was worried about a possible allergic reaction.     Patient is cold and shaking in triage.    Pt is alert and oriented, speaking in full sentences, follows commands and responds appropriately to questions. Resp are even and unlabored.      Pt placed in lobby. Pt educated on triage process. Pt encouraged to alert staff for any changes.     Patient and staff wearing appropriate PPE.    Ht 1.549 m (5' 1\")   Wt 51.6 kg (113 lb 12.1 oz)     "

## 2024-03-02 NOTE — ED NOTES
Patient ambulated longterm to the room and started vomiting, wheelchair provided to patient and wheeled to room, Transferred to [facility] without assistance. Patient changed into gown and placed on monitor. Chart up for ERP to see.

## 2024-03-02 NOTE — ED PROVIDER NOTES
ED Provider Note    CHIEF COMPLAINT  Chief Complaint   Patient presents with    Numbness     Woke up with numbness and tingling in all extremities    Headache    Epigastric Pain       EXTERNAL RECORDS REVIEWED  Outpatient Notes office visit on 1/8/2024 for SLE    HPI/ROS  LIMITATION TO HISTORY   Select: : None  OUTSIDE HISTORIAN(S):  Parent reports patient has a history of TTP    Estelle Pappas is a 23 y.o. female who presents with headache, body aches, paresthesias throughout all extremities, peripheral vasospasm, fatigue, pain.  Patient endorses chest pain as well.  Nausea.  Patient has a history of SLE and is being treated with Plaquenil and typically has lupus exacerbations which are more consistent with lethargy.  Patient has been in remission for TTP.  Patient reports that she took 200 mg ibuprofen for her dental pain and then she had worsening symptoms which is why she took Claritin because she thought she may have had an allergic reaction.  Patient has taken ibuprofen in the past without difficulty    PAST MEDICAL HISTORY   has a past medical history of Alopecia, Anemia, Mixed connective tissue disease (HCC), Raynaud phenomenon, SLE (systemic lupus erythematosus) (HCC), TTP (thrombotic thrombocytopenic purpura) (HCC), TTP (thrombotic thrombocytopenic purpura) (HCC), and Vitiligo.    SURGICAL HISTORY   has a past surgical history that includes cath placement.    FAMILY HISTORY  Family History   Problem Relation Age of Onset    Hyperlipidemia Father     Cancer Neg Hx     Diabetes Neg Hx     Heart Disease Neg Hx     Stroke Neg Hx        SOCIAL HISTORY  Social History     Tobacco Use    Smoking status: Never    Smokeless tobacco: Never   Vaping Use    Vaping Use: Never used   Substance and Sexual Activity    Alcohol use: No    Drug use: No    Sexual activity: Not Currently     Birth control/protection: Abstinence       CURRENT MEDICATIONS  Home Medications       Reviewed by Jensen Martines R.N.  "(Registered Nurse) on 03/02/24 at 0238  Med List Status: Not Addressed     Medication Last Dose Status   acetaminophen (TYLENOL) 325 MG Tab  Active   hydroxychloroquine (PLAQUENIL) 200 MG Tab  Active   loratadine (CLARITIN) 10 MG Tab  Active                    ALLERGIES  Allergies   Allergen Reactions    Amoxicillin Hives, Itching and Swelling     Patient stated       PHYSICAL EXAM  VITAL SIGNS: BP 91/52   Pulse (!) 117   Temp 37.9 °C (100.2 °F) (Temporal)   Resp 20   Ht 1.549 m (5' 1\")   Wt 51.6 kg (113 lb 12.1 oz)   SpO2 96%   BMI 21.49 kg/m²    Vitals and nursing note reviewed.   Constitutional:       Comments: Patient is lying in bed supine, pleasant, conversant, speaking in complete sentences   HENT:   Facial rash  Posterior oropharyngeal erythema  No evidence of swelling, purulent drainage, erythema at the site of wisdom tooth extraction  Eyes:      Extraocular Movements: Extraocular movements intact.      Conjunctiva/sclera: Conjunctivae normal.      Pupils: Pupils are equal, round, and reactive to light.   Cardiovascular:      Pulses: Normal pulses.      Comments:   Pulmonary:      Effort: Pulmonary effort is normal. No respiratory distress.   Abdominal:      Comments: Abdomen is soft, non-tender, non-distended, non-rigid, no rebound, guarding, masses, no McBurney's point tenderness, no peritoneal signs, negative Rovsing sign, negative Baptiste sign.  No CVA tenderness to palpation. Benign abdomen.   Musculoskeletal:      Peripheral extremity vasospasm     Cervical back: Normal range of motion. No rigidity.   Skin:     General: Skin is warm and dry.      Capillary Refill: Capillary refill takes less than 2 seconds.   Neurological:      Mental Status: Alert.       DIAGNOSTIC STUDIES / PROCEDURES  EKG  I have independently interpreted this EKG  Inferior ST depressions    LABS  Lactic acid 4.0    RADIOLOGY  I have independently interpreted the diagnostic imaging associated with this visit and am " waiting the final reading from the radiologist.   My preliminary interpretation is as follows: No evidence of infection  Radiologist interpretation: No evidence of infection    COURSE & MEDICAL DECISION MAKING    INITIAL ASSESSMENT, COURSE AND PLAN  Care Narrative: CT face to evaluate for postoperative infection.  IV fluids, Tylenol, Compazine for headache and fever.  Urinalysis to evaluate for UTI.   Chest x-ray to evaluate for acute cardiopulmonary process such as pneumonia, pulmonary edema, pneumothorax.  Respiratory viral panel to evaluate for flu, COVID, RSV.  Septic workup with blood cultures and lactic acid.  Coags ordered to evaluate for TTP.  Differential includes TTP, lupus flare, sepsis, anaphylaxis.  Anaphylaxis less likely at this time, patient without stridor, no obvious respiratory symptoms however we will provide patient with epinephrine should she appear to worsen.    Electronically signed by: Riley Lott M.D., 3/2/2024 3:48 AM    Patient has SIRS criteria with elevated lactic acid concerning for sepsis.  No obvious source of infection at this time.  Respiratory viral panel negative for flu, COVID, RSV.  Chest x-ray without acute cardiopulmonary process.  CT max face without evidence of postoperative infection.  Patient started on antibiotics regardless and will be admitted to hospital medicine pending culture growth.  This presentation could be secondary to lupus flare as well.  Disposition to the hospital medicine service.    This dictation has been created using voice recognition software. I am continuously working with the software to minimize the number of voice recognition errors and I have made every attempt to manually correct the errors within my dictation. However errors  related to this voice recognition software may still exist and should be interpreted within the appropriate context.     Electronically signed by: Riley Lott M.D., 3/2/2024 6:22 AM    CRITICAL  CARE  The very real possibilty of a deterioration of this patient's condition required the highest level of my preparedness for sudden, emergent intervention.  I provided critical care services, which included medication orders, frequent reevaluations of the patient's condition and response to treatment, ordering and reviewing test results, and discussing the case with various consultants.  The critical care time associated with the care of the patient was 36 minutes. Review chart for interventions. This time is exclusive of any other billable procedures.       HYDRATION: Based on the patient's presentation of Sepsis the patient was given IV fluids. IV Hydration was used because oral hydration was not adequate alone. Upon recheck following hydration, the patient was improved.        DISPOSITION AND DISCUSSIONS       Decision tools and prescription drugs considered including, but not limited to: HEART Score 2 .    FINAL DIAGNOSIS  1. Sepsis, due to unspecified organism, unspecified whether acute organ dysfunction present (HCC)    2. ST segment depression           Electronically signed by: Riley Lott M.D., 3/2/2024 3:43 AM

## 2024-03-03 ENCOUNTER — PHARMACY VISIT (OUTPATIENT)
Dept: PHARMACY | Facility: MEDICAL CENTER | Age: 24
End: 2024-03-03
Payer: COMMERCIAL

## 2024-03-03 VITALS
TEMPERATURE: 98.1 F | BODY MASS INDEX: 23.81 KG/M2 | OXYGEN SATURATION: 95 % | HEIGHT: 61 IN | DIASTOLIC BLOOD PRESSURE: 61 MMHG | HEART RATE: 73 BPM | WEIGHT: 126.1 LBS | RESPIRATION RATE: 16 BRPM | SYSTOLIC BLOOD PRESSURE: 99 MMHG

## 2024-03-03 PROBLEM — A41.9 SEPSIS (HCC): Status: RESOLVED | Noted: 2024-03-02 | Resolved: 2024-03-03

## 2024-03-03 LAB
ALBUMIN SERPL BCP-MCNC: 3.2 G/DL (ref 3.2–4.9)
ALBUMIN/GLOB SERPL: 1.2 G/DL
ALP SERPL-CCNC: 67 U/L (ref 30–99)
ALT SERPL-CCNC: 142 U/L (ref 2–50)
ANION GAP SERPL CALC-SCNC: 12 MMOL/L (ref 7–16)
AST SERPL-CCNC: 130 U/L (ref 12–45)
BILIRUB SERPL-MCNC: <0.2 MG/DL (ref 0.1–1.5)
BUN SERPL-MCNC: 6 MG/DL (ref 8–22)
CALCIUM ALBUM COR SERPL-MCNC: 8.4 MG/DL (ref 8.5–10.5)
CALCIUM SERPL-MCNC: 7.8 MG/DL (ref 8.5–10.5)
CHLORIDE SERPL-SCNC: 110 MMOL/L (ref 96–112)
CO2 SERPL-SCNC: 18 MMOL/L (ref 20–33)
CREAT SERPL-MCNC: 0.46 MG/DL (ref 0.5–1.4)
ERYTHROCYTE [DISTWIDTH] IN BLOOD BY AUTOMATED COUNT: 38.5 FL (ref 35.9–50)
GFR SERPLBLD CREATININE-BSD FMLA CKD-EPI: 137 ML/MIN/1.73 M 2
GLOBULIN SER CALC-MCNC: 2.6 G/DL (ref 1.9–3.5)
GLUCOSE SERPL-MCNC: 108 MG/DL (ref 65–99)
HCT VFR BLD AUTO: 29.4 % (ref 37–47)
HGB BLD-MCNC: 10 G/DL (ref 12–16)
MCH RBC QN AUTO: 27.7 PG (ref 27–33)
MCHC RBC AUTO-ENTMCNC: 34 G/DL (ref 32.2–35.5)
MCV RBC AUTO: 81.4 FL (ref 81.4–97.8)
PLATELET # BLD AUTO: 214 K/UL (ref 164–446)
PMV BLD AUTO: 11 FL (ref 9–12.9)
POTASSIUM SERPL-SCNC: 3.9 MMOL/L (ref 3.6–5.5)
PROT SERPL-MCNC: 5.8 G/DL (ref 6–8.2)
RBC # BLD AUTO: 3.61 M/UL (ref 4.2–5.4)
SODIUM SERPL-SCNC: 140 MMOL/L (ref 135–145)
WBC # BLD AUTO: 5.3 K/UL (ref 4.8–10.8)

## 2024-03-03 PROCEDURE — A9270 NON-COVERED ITEM OR SERVICE: HCPCS

## 2024-03-03 PROCEDURE — 700102 HCHG RX REV CODE 250 W/ 637 OVERRIDE(OP)

## 2024-03-03 PROCEDURE — 700111 HCHG RX REV CODE 636 W/ 250 OVERRIDE (IP)

## 2024-03-03 PROCEDURE — 85027 COMPLETE CBC AUTOMATED: CPT

## 2024-03-03 PROCEDURE — 700105 HCHG RX REV CODE 258

## 2024-03-03 PROCEDURE — 80053 COMPREHEN METABOLIC PANEL: CPT

## 2024-03-03 PROCEDURE — 36415 COLL VENOUS BLD VENIPUNCTURE: CPT

## 2024-03-03 PROCEDURE — 99238 HOSP IP/OBS DSCHRG MGMT 30/<: CPT | Mod: GC | Performed by: FAMILY MEDICINE

## 2024-03-03 PROCEDURE — RXMED WILLOW AMBULATORY MEDICATION CHARGE

## 2024-03-03 RX ORDER — PREDNISONE 10 MG/1
30 TABLET ORAL DAILY
Qty: 3 TABLET | Refills: 0 | Status: SHIPPED | OUTPATIENT
Start: 2024-03-03 | End: 2024-03-04

## 2024-03-03 RX ORDER — DIPHENHYDRAMINE HCL 25 MG
25 CAPSULE ORAL EVERY 6 HOURS PRN
Qty: 30 CAPSULE | Refills: 0 | Status: SHIPPED | OUTPATIENT
Start: 2024-03-03

## 2024-03-03 RX ADMIN — PREDNISONE 30 MG: 10 TABLET ORAL at 08:25

## 2024-03-03 RX ADMIN — CEFAZOLIN 2 G: 10 INJECTION, POWDER, FOR SOLUTION INTRAVENOUS at 05:07

## 2024-03-03 RX ADMIN — SODIUM CHLORIDE, POTASSIUM CHLORIDE, SODIUM LACTATE AND CALCIUM CHLORIDE: 600; 310; 30; 20 INJECTION, SOLUTION INTRAVENOUS at 00:30

## 2024-03-03 RX ADMIN — HYDROXYCHLOROQUINE SULFATE 200 MG: 200 TABLET ORAL at 08:25

## 2024-03-03 ASSESSMENT — PAIN DESCRIPTION - PAIN TYPE
TYPE: ACUTE PAIN
TYPE: ACUTE PAIN

## 2024-03-03 NOTE — DISCHARGE SUMMARY
[facility] MEDICINE DISCHARGE SUMMARY     Admit Date:  3/2/2024       Discharge Date:   3/3/2024    Attending: Luz Pabon M.d.     Resident: Cam Barboza M.D. (PGY-1)    PATIENT: Estelle Pappas; [id number]; 2000    Diagnoses (includes active and resolved):  Principal Problem (Resolved):    Sepsis (HCC) (POA: Yes)  Active Problems:    Rash and nonspecific skin eruption (POA: No)    SLE (systemic lupus erythematosus related syndrome) (HCC) (POA: Yes)       Patient Active Problem List   Diagnosis    MCTD (mixed connective tissue disease) (HCC)    Raynaud's phenomenon without gangrene    Hemolytic anemia (HCC)    SLE (systemic lupus erythematosus related syndrome) (HCC)    Ganglion cyst of dorsum of left wrist    Nausea    Herpes simplex vulvovaginitis    Cervical cancer screening    Encounter for counseling regarding contraception    Urticaria    Rash and nonspecific skin eruption        Hospital Summary (Brief Narrative):       Estelle  is a 23 y.o.  Female with hx of SLE and recent wisdom tooth extraction admitted 3/2/2024 with fever, tachycardia, and lactic acidosis concerning for sepsis.    Patient had a wisdom tooth extraction 2 weeks prior to her presentation.  General normal postoperative course.  However, on 2/27 she was having sore throat and flulike symptoms, so she presented to urgent care for evaluation.  She was evaluated for strep, as well as abscess, and was determined to have a viral pharyngitis and was discharged home with recommendations for supportive care.  Patient states she feels her symptoms have improved, but then began to worsen again on the date of admission, with increasing fevers and headaches.  She is evaluated in the ED, found to have fever, tachycardia, and lactic acid of 4.0.  CT maxillofacial was performed, which did not show any evidence of abscess or other soft tissue infection.  Blood cultures were drawn.  Patient was started on empiric antibiotics due to meeting  sepsis criteria, and was admitted for further evaluation.    Later on the morning of admission, the patient began to develop a rash of her face, bilateral hands, and bilateral thighs.  Patient was medicated with Benadryl with some relief of symptoms.  Patient had received IV contrast for her CT scan, as well as Rocephin on the morning before symptoms.  Patient feels her rash was developing after the CT scan, but prior to the initiation of antibiotics.  It is presumed she had a reaction to IV contrast, and this was noted in her allergy list prior to discharge.  Out of abundance of caution, ceftriaxone was switched to Ancef after discussion with pharmacist, as well as initiating prednisone 0.5 mg/kg/day x 3 days.      On the morning of admission, extended respiratory panel was ordered to evaluate for viral etiology.  Non-COVID-19 coronavirus was detected.  The patient's symptoms gradually improved overnight.  Antibiotics were stopped due to suspected viral source of symptoms. Blood cultures remained negative throughout admission by the time of discharge.  Patient will be discharged home with remainder of her steroid course, as well as Benadryl for any continued rash.  Patient given strict return precautions with any new or worsening symptoms, including shortness of breath, fevers, chills.     Consultants:      None    Procedures:        None    Discharge Medications:        Medication Reconciliation Completed     Medication List        START taking these medications        Instructions   Banophen 25 MG capsule  Generic drug: diphenhydrAMINE   Take 1 Capsule by mouth every 6 hours as needed for Itching or Rash.  Dose: 25 mg     mag hydrox-al hydrox-simeth-diphenhydrAMINE-lidocaine   Swish and spit 30 mL every 6 hours as needed (for oral mucocutaneous lupus flare).  Dose: 30 mL     predniSONE 10 MG Tabs  Commonly known as: Deltasone   Take 3 Tablets by mouth every day for 1 day.  Dose: 30 mg            CONTINUE taking  these medications        Instructions   chlorhexidine 0.12 % Soln  Commonly known as: Peridex   Take 15 mL by mouth 2 times a day. X7 days  Dose: 15 mL     hydroxychloroquine 200 MG Tabs  Commonly known as: Plaquenil   400mg (2 tabs) on Monday wed and Friday and 200mg every other day of the week     ibuprofen 800 MG Tabs  Commonly known as: Motrin   Take 800 mg by mouth every 8 hours as needed for Mild Pain. for pain  Dose: 800 mg            STOP taking these medications      clindamycin 300 MG Caps  Commonly known as: Cleocin              Discharge Criteria: The patient recovered much more quickly than anticipated on admission.    Disposition:  to home with close outpatient follow-up    Diet:  regular diet    Activity:  Activity as tolerated.    Code Status: Full Code      PCP: Kevyn Leal M.D.    Follow Up:  Kevyn Leal M.D.  [address] [facility] [address]  [phone]    Follow up in 1 week(s)      [facility], Emergency Dept  [address] [place] [id number]  [phone]  Follow up  As needed, If symptoms worsen     Future Appointments   Date Time Provider Department Center   4/9/2024 11:30 AM AIXA Walker None   7/8/2024  3:00 PM Naomie Deshpande M.D. [facility] None        Instructions:         The patient was instructed to return to the ER in the event of worsening symptoms. I have counseled the patient on the importance of compliance and the patient has agreed to proceed with all medical recommendations and follow up plan indicated above.   The patient understands that all medications come with benefits and risks. Risks may include permanent injury or death and these risks can be minimized with close reassessment and monitoring.            #########################################################    24 Hour Events: No acute events overnight,  Patient reports she feels much better today.  Her rash is improving.  She states that she feels the rash began after her CT  scan yesterday, but before antibiotics were administered.  She feels good about discharge home today with close outpatient follow-up.  Requesting a work note.    OBJECTIVE:     Vitals:    03/02/24 2310 03/03/24 0350 03/03/24 0727 03/03/24 1134   BP: 97/64 96/61 90/51 99/61   Pulse: 96 80 75 73   Resp: 18 18 17 16   Temp: 36.9 °C (98.4 °F) 36.6 °C (97.9 °F) 36.8 °C (98.2 °F) 36.7 °C (98.1 °F)   TempSrc: Temporal Temporal Temporal Temporal   SpO2: 95% 96% 96% 95%   Weight:       Height:           Intake/Output Summary (Last 24 hours) at 3/3/2024 1440  Last data filed at 3/3/2024 1400  Gross per 24 hour   Intake 2375.74 ml   Output --   Net 2375.74 ml       Physical Exam  Vitals reviewed.   Constitutional:       General: She is not in acute distress.     Appearance: Normal appearance. She is not toxic-appearing.      Comments: Young female lying in hospital bed, appears improved compared to admission.  Malar rash on face, bilateral hands significantly improved from the day before.  HENT:      Head: Normocephalic and atraumatic.      Right Ear: External ear normal.      Left Ear: External ear normal.      Nose: Nose normal. No congestion.      Mouth/Throat:      Mouth: Mucous membranes are moist.      Pharynx: Oropharynx is clear. No oropharyngeal exudate or posterior oropharyngeal erythema.   Eyes:      Extraocular Movements: Extraocular movements intact.      Pupils: Pupils are equal, round, and reactive to light.   Cardiovascular:      Rate and Rhythm: Normal rate and regular rhythm.      Heart sounds: No murmur heard.  Pulmonary:      Effort: Pulmonary effort is normal. No respiratory distress.      Breath sounds: Normal breath sounds.   Abdominal:      General: Abdomen is flat. Bowel sounds are normal. There is no distension.      Palpations: Abdomen is soft.      Tenderness: There is no abdominal tenderness. There is no guarding or rebound.   Musculoskeletal:         General: Swelling (Bilateral hands) present,  resolving. No deformity. Normal range of motion.      Cervical back: Normal range of motion and neck supple.   Skin:     General: Skin is warm and dry.      Capillary Refill: Capillary refill takes less than 2 seconds.      Findings: Rash (Macular rash, face, hands, upper legs) present, improving.   Neurological:      General: No focal deficit present.      Mental Status: She is alert and oriented to person, place, and time.      Cranial Nerves: No cranial nerve deficit.   Psychiatric:         Mood and Affect: Mood normal.         Behavior: Behavior normal.     LABS:  Recent Labs     03/02/24  0759 03/03/24  0120   WBC 9.2 5.3   RBC 3.80* 3.61*   HEMOGLOBIN 10.6* 10.0*   HEMATOCRIT 31.6* 29.4*   MCV 83.2 81.4   MCH 27.9 27.7   RDW 39.0 38.5   PLATELETCT 216 214   MPV 10.0 11.0   NEUTSPOLYS 93.90*  --    LYMPHOCYTES 1.90*  --    MONOCYTES 3.50  --    EOSINOPHILS 0.30  --    BASOPHILS 0.10  --      Recent Labs     03/02/24  0759 03/03/24  0120   SODIUM 141 140   POTASSIUM 3.1* 3.9   CHLORIDE 112 110   CO2 18* 18*   BUN 13 6*   CREATININE 0.54 0.46*   CALCIUM 7.3* 7.8*   ALBUMIN 3.1* 3.2     Estimated GFR/CRCL = Estimated Creatinine Clearance: 143.5 mL/min (A) (by C-G formula based on SCr of 0.46 mg/dL (L)).  Recent Labs     03/02/24  0759 03/03/24  0120   GLUCOSE 108* 108*     Recent Labs     03/02/24  0605 03/02/24  0759 03/03/24  0120   ASTSGOT  --  297* 130*   ALTSGPT  --  189* 142*   TBILIRUBIN  --  0.3 <0.2   ALKPHOSPHAT  --  71 67   GLOBULIN  --  2.6 2.6   INR 1.16*  --   --              Recent Labs     03/02/24  0605   INR 1.16*   APTT 23.6*       MICROBIOLOGY:   Results       Procedure Component Value Units Date/Time    Blood Culture [[id number]] Collected: 03/02/24 0418    Order Status: Completed Specimen: Blood from Peripheral Updated: 03/03/24 0819     Significant Indicator NEG     Source BLD     Site PERIPHERAL     Culture Result No Growth  Note: Blood cultures are incubated for 5 days and  are monitored  "continuously.Positive blood cultures  are called to the RN and reported as soon as  they are identified.      Narrative:      Per Hospital Policy: Only change Specimen Src: to \"Line\" if  specified by physician order.  Left Forearm/Arm    Blood Culture [[id number]] Collected: 03/02/24 0456    Order Status: Completed Specimen: Blood from Peripheral Updated: 03/03/24 0819     Significant Indicator NEG     Source BLD     Site PERIPHERAL     Culture Result No Growth  Note: Blood cultures are incubated for 5 days and  are monitored continuously.Positive blood cultures  are called to the RN and reported as soon as  they are identified.      Narrative:      Per Hospital Policy: Only change Specimen Src: to \"Line\" if  specified by physician order.  Left AC    Respiratory Panel by PCR (Inpatient ONLY) [[id number]]  (Abnormal) Collected: 03/02/24 1320    Order Status: Completed Specimen: Respirate from Nasopharyngeal Updated: 03/02/24 1457     Adenovirus, PCR Not Detected     SARS-CoV-2 (COVID-19) RNA by TIFFANY NotDetected     Comment: [facility] providers: PLEASE REFER TO DE-ESCALATION AND RETESTING PROTOCOL  on [email or web address]    **The [employer] Respiratory Panel 2.1 (RP2.1) RT-PCR test is FDA authorized.          Coronavirus 229E, PCR DETECTED     Coronavirus HKU1, PCR Not Detected     Coronavirus NL63, PCR Not Detected     Coronavirus OC43, PCR Not Detected     Human Metapneumovirus, PCR Not Detected     Rhino/Enterovirus, PCR Not Detected     Influenza A, PCR Not Detected     Influenza B, PCR Not Detected     Parainfluenza 1, PCR Not Detected     Parainfluenza 2, PCR Not Detected     Parainfluenza 3, PCR Not Detected     Parainfluenza 4, PCR Not Detected     RSV (Respiratory Syncytial Virus), PCR Not Detected     Bordetella parapertussis (JZ2262), PCR Not Detected     Bordetella pertussis (ptxP), PCR Not Detected     Mycoplasma pneumoniae, PCR Not Detected     Chlamydia pneumoniae, PCR Not Detected    Narrative:      Collected By: " [id number] [facility]  UTILIZATION ALERT: Has Flu/RSV/COVID PCR testing been  performed, resulted reviewed, and consulted with Infectious  Diseases or PICU Provider Teams?->Yes  Have you been in close contact with a person who is suspected  or known to be positive for COVID-19 within the last 30 days  (e.g. last seen that person < 30 days ago)->No    Group A Strep by PCR [[id number]] Collected: 03/02/24 0418    Order Status: Completed Specimen: Throat Updated: 03/02/24 0500     Group A Strep by PCR Not Detected    URINALYSIS [[id number]]  (Abnormal) Collected: 03/02/24 0245    Order Status: Completed Specimen: Urine Updated: 03/02/24 0402     Color Yellow     Character Cloudy     Specific Gravity 1.024     Ph 6.0     Glucose Negative mg/dL      Ketones Trace mg/dL      Protein Negative mg/dL      Bilirubin Negative     Urobilinogen, Urine 0.2     Nitrite Negative     Leukocyte Esterase Small     Occult Blood Trace     Micro Urine Req Microscopic              IMAGING:   CT-MAXILLOFACIAL WITH PLUS RECONS   Final Result      No evidence of infection or other acute process following wisdom tooth removal.      DX-CHEST-LIMITED (1 VIEW)   Final Result      No acute process.            Cam Barboza M.D.   PGY-1  [facility]

## 2024-03-03 NOTE — PROGRESS NOTES
Discharging Patient home per physician order.  Discharged with Mother.  Demonstrated understanding of discharge instructions, follow up appointments, home medications and prescriptions.Ambulating without assistance, voiding without difficulty, pain well controlled, tolerating oral medications, oxygen saturation greater than 90% , tolerating diet. Educational handouts given and discussed.  Verbalized understanding of discharge instructions and educational handouts.  Stated several reasons why to return to ED or seek medical attention. All questions answered.  Belongings and dressing supplies with patient at time of discharge.

## 2024-03-03 NOTE — PROGRESS NOTES
Report received from previous shift RN.  Assessment complete.  Patient resting in bed comfortably, even and unlabored breathing present.  Patient denies chills/SOB. Spo2 > 90% on room air. Redness to face and hands.   All needs met at this time. Call light within reach. Hourly rounding in place.

## 2024-03-03 NOTE — CARE PLAN
The patient is Stable - Low risk of patient condition declining or worsening    Shift Goals  Clinical Goals: monitor hives, IVF, monitor lactic  Patient Goals: rest  Family Goals: rest, updated on POC    Progress made toward(s) clinical / shift goals:  Pt medicated per MAR, resting. IV fluids running. Monitoring lactics. Hives improving with medication. Updated pt and family on POC. Care ongoing.    Patient is not progressing towards the following goals:

## 2024-03-03 NOTE — ASSESSMENT & PLAN NOTE
Patient with malar rash on face and bilateral hands, as well as upper legs bilaterally after admission to the floor.  Chart review shows that she was administered Compazine, Rocephin, Tylenol, and IV contrast in the ED prior to the onset of this rash. She denies any difficulty breathing.  - IV Benadryl  - Discussed with inpatient pharmacist, they recommend Ancef as an alternative to ceftriaxone, as it does not have cross-reactivity with ceftriaxone or amoxicillin.  Also recommend prednisone 0.5 mg/kg/day x 3 days  - Ordered Ancef, prednisone as above  - Monitor for progression/resolution of rash  - IV contrast is another possible etiology of the patient's rash

## 2024-03-03 NOTE — CARE PLAN
The patient is Stable - Low risk of patient condition declining or worsening     Progress made toward(s) clinical / shift goals:      Problem: Pain - Standard  Goal: Alleviation of pain or a reduction in pain to the patient’s comfort goal  Outcome: Progressing     Problem: Knowledge Deficit - Standard  Goal: Patient and family/care givers will demonstrate understanding of plan of care, disease process/condition, diagnostic tests and medications  Outcome: Progressing     Problem: Hemodynamics  Goal: Patient's hemodynamics, fluid balance and neurologic status will be stable or improve  Outcome: Progressing     Problem: Fluid Volume  Goal: Fluid volume balance will be maintained  Outcome: Progressing     Problem: Urinary - Renal Perfusion  Goal: Ability to achieve and maintain adequate renal perfusion and functioning will improve  Outcome: Progressing     Problem: Respiratory  Goal: Patient will achieve/maintain optimum respiratory ventilation and gas exchange  Outcome: Progressing     Patient has call light within reach and calls appropriately. Patient has been updated on plan of care and will continue to be updated as information arises. Patient got oriented to the unit and has been ambulating to the bathroom. Patient has tolerated fluids for hydration.

## 2024-03-03 NOTE — ASSESSMENT & PLAN NOTE
Diagnosed 7 years ago.  Currently taking Plaquenil 400 mg Monday Wednesday Friday, 200 mg on other days.  - Reordered home Plaquenil  - Lupus is a possible alternative etiology of the patient's rash, though less likely given the concomitant swelling

## 2024-03-03 NOTE — DISCHARGE INSTRUCTIONS
Sepsis, Diagnosis, Adult  Sepsis is a serious bodily reaction to an infection. The infection that triggers sepsis may be from a bacteria, virus, or fungus. Sepsis can result from an infection in any part of your body. Infections that commonly lead to sepsis include skin, lung, and urinary tract infections.  Sepsis is a medical emergency that must be treated right away in a hospital. In severe cases, it can lead to septic shock. Septic shock can weaken your heart and cause your blood pressure to drop. This can cause your central nervous system and your body's organs to stop working.  What are the causes?  This condition is caused by a severe reaction to infections from bacteria, viruses, or fungus. The germs that most often lead to sepsis include:  Escherichia coli (E. coli) bacteria.  Staphylococcus aureus (staph) bacteria.  Some types of Streptococcus bacteria.  The most common infections affect these organs:  The lung (pneumonia).  The kidneys or bladder (urinary tract infection).  The skin (cellulitis).  The bowel, gallbladder, or pancreas.  What increases the risk?  You are more likely to develop this condition if:  Your body's disease-fighting system (immune system) is weakened.  You are age 65 or older.  You are male.  You had surgery or you have been hospitalized.  You have these devices inserted into your body:  A small, thin tube (catheter).  IV line.  Breathing tube.  Drainage tube.  You are not getting enough nutrients from food (malnourished).  You have a chronic disease, such as cancer, lung disease, kidney disease, or diabetes.  What are the signs or symptoms?  Symptoms of this condition may include:  Fever.  Chills or feeling very cold.  Confusion or anxiety.  Fatigue.  Muscle aches.  Shortness of breath or rapid breathing (hyperventilation).  Nausea and vomiting.  Urinating much less than usual.  Fast heart rate.  Changes in skin color. Your skin may look blotchy, pale, or blue.  Cool, clammy, or  sweaty skin.  Skin rash.  Other symptoms depend on the source of your infection.  How is this diagnosed?  This condition is diagnosed based on your symptoms, medical history, and physical exam. Other tests may also be done to find out the cause of the infection and how severe the sepsis is. Tests may include:  Blood tests.  Urine tests.  Swabs from other areas of your body that may have an infection. These samples may be tested (cultured) to find out what type of bacteria is causing the infection.  Chest X-ray to check for pneumonia. Other imaging tests, such as a CT scan, may also be done.  Lumbar puncture. This removes a small amount of the fluid that surrounds your brain and spinal cord. The fluid is then examined for infection.  How is this treated?  This condition must be treated in a hospital. Based on the cause of your infection, you may be given an antibiotic, antiviral, or antifungal medicine.  You may also receive:  Fluids through an IV.  Oxygen and breathing assistance.  Medicines to increase your blood pressure.  Kidney dialysis. This process cleans your blood if your kidneys have failed.  Surgery to remove infected tissue.  Blood transfusion if needed.  Medicine to prevent blood clots.  Nutrients to correct imbalances in basic body function (metabolism). You may:  Receive important salts and minerals (electrolytes) through an IV.  Have your blood sugar level adjusted.  Follow these instructions at home:  Medicines    Take over-the-counter and prescription medicines only as told by your health care provider.  If you were prescribed an antibiotic, antiviral, or antifungal medicine, take it as told by your health care provider. Do not stop taking the medicine even if you start to feel better.  General instructions  If you have a catheter or other indwelling device, ask to have it removed as soon as possible.  Keep all follow-up visits. This is important.  Contact a health care provider if:  You do not feel  like you are getting better or regaining strength.  You are having trouble coping with your recovery.  You frequently feel tired.  You feel worse or do not seem to get better after surgery.  You think you may have an infection after surgery.  Get help right away if:  You have any symptoms of sepsis.  You have difficulty breathing.  You have a rapid or skipping heartbeat.  You become confused or disoriented.  You have a high fever.  Your skin becomes blotchy, pale, or blue.  You have an infection that is getting worse or not getting better.  These symptoms may represent a serious problem that is an emergency. Do not wait to see if the symptoms will go away. Get medical help right away. Call your local emergency services (911 in the [place] Do not drive yourself to the hospital.  Summary  Sepsis is a medical emergency that requires immediate treatment in a hospital.  This condition is caused by a severe reaction to infections from bacteria, viruses, or fungus.  Based on the cause of your infection, you may be given an antibiotic, antiviral, or antifungal medicine.  Treatment may also include IV fluids, breathing assistance, and kidney dialysis.  This information is not intended to replace advice given to you by your health care provider. Make sure you discuss any questions you have with your health care provider.  Document Revised: 11/01/2021 Document Reviewed: 11/01/2021  [employer] Â® Patient Education © 2023 [employer] Â® [employer]    COVID-19  COVID-19, or coronavirus disease 2019, is an infection that is caused by a new (novel) coronavirus called SARS-CoV-2. COVID-19 can cause many symptoms. In some people, the virus may not cause any symptoms. In others, it may cause mild or severe symptoms. Some people with severe infection develop severe disease.  What are the causes?  This illness is caused by a virus. The virus may be in the air as tiny specks of fluid (aerosols) or droplets, or it may be on surfaces. You may catch the  virus by:  Breathing in droplets from an infected person. Droplets can be spread by a person breathing, speaking, singing, coughing, or sneezing.  Touching something, like a table or a doorknob, that has virus on it (is contaminated) and then touching your mouth, nose, or eyes.  What increases the risk?  Risk for infection:  You are more likely to get infected with the COVID-19 virus if:  You are within 6 ft (1.8 m) of a person with COVID-19 for 15 minutes or longer.  You are providing care for a person who is infected with COVID-19.  You are in close personal contact with other people. Close personal contact includes hugging, kissing, or sharing eating or drinking utensils.  Risk for serious illness caused by COVID-19:  You are more likely to get seriously ill from the COVID-19 virus if:  You have cancer.  You have a long-term (chronic) disease, such as:  Chronic lung disease. This includes pulmonary embolism, chronic obstructive pulmonary disease, and cystic fibrosis.  Long-term disease that lowers your body's ability to fight infection (immunocompromise).  Serious cardiac conditions, such as heart failure, coronary artery disease, or cardiomyopathy.  Diabetes.  Chronic kidney disease.  Liver diseases. These include cirrhosis, nonalcoholic fatty liver disease, alcoholic liver disease, or autoimmune hepatitis.  You have obesity.  You are pregnant or were recently pregnant.  You have sickle cell disease.  What are the signs or symptoms?  Symptoms of this condition can range from mild to severe. Symptoms may appear any time from 2 to 14 days after being exposed to the virus. They include:  Fever or chills.  Shortness of breath or trouble breathing.  Feeling tired or very tired.  Headaches, body aches, or muscle aches.  Runny or stuffy nose, sneezing, coughing, or sore throat.  New loss of taste or smell. This is rare.  Some people may also have stomach problems, such as nausea, vomiting, or diarrhea.  Other people  may not have any symptoms of COVID-19.  How is this diagnosed?  This condition may be diagnosed by testing samples to check for the COVID-19 virus. The most common tests are the PCR test and the antigen test. Tests may be done in the lab or at home. They include:  Using a swab to take a sample of fluid from the back of your nose and throat (nasopharyngeal fluid), from your nose, or from your throat.  Testing a sample of saliva from your mouth.  Testing a sample of coughed-up mucus from your lungs (sputum).  How is this treated?  Treatment for COVID-19 infection depends on the severity of the condition.  Mild symptoms can be managed at home with rest, fluids, and over-the-counter medicines.  Serious symptoms may be treated in a hospital intensive care unit (ICU). Treatment in the ICU may include:  Supplemental oxygen. Extra oxygen is given through a tube in the nose, a face mask, or a norman.  Medicines. These may include:  Antivirals, such as monoclonal antibodies. These help your body fight off certain viruses that can cause disease.  Anti-inflammatories, such as corticosteroids. These reduce inflammation and suppress the immune system.  Antithrombotics. These prevent or treat blood clots, if they develop.  Convalescent plasma. This helps boost your immune system, if you have an underlying immunosuppressive condition or are getting immunosuppressive treatments.  Prone positioning. This means you will lie on your stomach. This helps oxygen to get into your lungs.  Infection control measures.  If you are at risk for more serious illness caused by COVID-19, your health care provider may prescribe two long-acting monoclonal antibodies, given together every 6 months.  How is this prevented?  To protect yourself:  Use preventive medicine (pre-exposure prophylaxis). You may get pre-exposure prophylaxis if you have moderate or severe immunocompromise.  Get vaccinated. Anyone 6 months old or older who meets guidelines can get  a COVID-19 vaccine or vaccine series. This includes people who are pregnant or making breast milk (lactating).  Get an added dose of COVID-19 vaccine after your first vaccine or vaccine series if you have moderate to severe immunocompromise. This applies if you have had a solid organ transplant or have been diagnosed with an immunocompromising condition.  You should get the added dose 4 weeks after you got the first COVID-19 vaccine or vaccine series.  If you get an mRNA vaccine, you will need a 3-dose primary series.  If you get the J&J/Harl[employer] vaccine, you will need a 2-dose primary series, with the second dose being an mRNA vaccine.  Talk to your health care provider about getting experimental monoclonal antibodies. This treatment is approved under emergency use authorization to prevent severe illness before or after being exposed to the COVID-19 virus. You may be given monoclonal antibodies if:  You have moderate or severe immunocompromise. This includes treatments that lower your immune response. People with immunocompromise may not develop protection against COVID-19 when they are vaccinated.  You cannot be vaccinated. You may not get a vaccine if you have a severe allergic reaction to the vaccine or its components.  You are not fully vaccinated.  You are in a facility where COVID-19 is present and:  Are in close contact with a person who is infected with the COVID-19 virus.  Are at high risk of being exposed to the COVID-19 virus.  You are at risk of illness from new variants of the COVID-19 virus.  To protect others:  If you have symptoms of COVID-19, take steps to prevent the virus from spreading to others.  Stay home. Leave your house only to get medical care. Do not use public transit, if possible.  Do not travel while you are sick.  Wash your hands often with soap and water for at least 20 seconds. If soap and water are not available, use alcohol-based hand .  Make sure that all people in your  household wash their hands well and often.  Cough or sneeze into a tissue or your sleeve or elbow. Do not cough or sneeze into your hand or into the air.  Where to find more information  Centers for Disease Control and Prevention: www.cdc.gov/coronavirus  World Health Organization: www.who.int/health-topics/coronavirus  Get help right away if:  You have trouble breathing.  You have pain or pressure in your chest.  You are confused.  You have bluish lips and fingernails.  You have trouble waking from sleep.  You have symptoms that get worse.  These symptoms may be an emergency. Get help right away. Call 911.  Do not wait to see if the symptoms will go away.  Do not drive yourself to the hospital.  Summary  COVID-19 is an infection that is caused by a new coronavirus.  Sometimes, there are no symptoms. Other times, symptoms range from mild to severe. Some people with a severe COVID-19 infection develop severe disease.  The virus that causes COVID-19 can spread from person to person through droplets or aerosols from breathing, speaking, singing, coughing, or sneezing.  Mild symptoms of COVID-19 can be managed at home with rest, fluids, and over-the-counter medicines.  This information is not intended to replace advice given to you by your health care provider. Make sure you discuss any questions you have with your health care provider.  Document Revised: 12/08/2022 Document Reviewed: 12/08/2022  [employer] Patient Education © 2023 [employer]

## 2024-03-03 NOTE — PROGRESS NOTES
4 Eyes Skin Assessment Completed by EMILIANA Costello and EMILIANA Alvarado.    Head Redness  Ears WDL  Nose WDL  Mouth WDL  Neck WDL  Breast/Chest WDL  Shoulder Blades WDL  Spine WDL  (R) Arm/Elbow/Hand Rash  (L) Arm/Elbow/Hand Rash  Abdomen WDL  Groin WDL  Scrotum/Coccyx/Buttocks WDL  (R) Leg Scar  (L) Leg Rash  (R) Heel/Foot/Toe WDL  (L) Heel/Foot/Toe WDL          Devices In Places Blood Pressure Cuff and Pulse Ox      Interventions In Place Pillows and Low Air Loss Mattress    Possible Skin Injury No    Pictures Uploaded Into Epic N/A  Wound Consult Placed N/A  RN Wound Prevention Protocol Ordered No     Patient has bilateral upper and lower extremity rashes and a rash across her cheeks and eyebrows.

## 2024-06-18 ENCOUNTER — OFFICE VISIT (OUTPATIENT)
Dept: URGENT CARE | Facility: CLINIC | Age: 24
End: 2024-06-18

## 2024-06-18 ENCOUNTER — HOSPITAL ENCOUNTER (OUTPATIENT)
Facility: MEDICAL CENTER | Age: 24
End: 2024-06-18
Attending: NURSE PRACTITIONER

## 2024-06-18 VITALS
BODY MASS INDEX: 20.77 KG/M2 | RESPIRATION RATE: 12 BRPM | DIASTOLIC BLOOD PRESSURE: 68 MMHG | SYSTOLIC BLOOD PRESSURE: 100 MMHG | OXYGEN SATURATION: 98 % | WEIGHT: 110 LBS | HEIGHT: 61 IN | HEART RATE: 64 BPM | TEMPERATURE: 97.5 F

## 2024-06-18 DIAGNOSIS — R30.0 DYSURIA: ICD-10-CM

## 2024-06-18 DIAGNOSIS — Z3A.01 LESS THAN 8 WEEKS GESTATION OF PREGNANCY: ICD-10-CM

## 2024-06-18 DIAGNOSIS — B00.9 HSV-2 (HERPES SIMPLEX VIRUS 2) INFECTION: ICD-10-CM

## 2024-06-18 LAB
APPEARANCE UR: CLEAR
BILIRUB UR STRIP-MCNC: NEGATIVE MG/DL
COLOR UR AUTO: YELLOW
GLUCOSE UR STRIP.AUTO-MCNC: NEGATIVE MG/DL
KETONES UR STRIP.AUTO-MCNC: NORMAL MG/DL
LEUKOCYTE ESTERASE UR QL STRIP.AUTO: NORMAL
NITRITE UR QL STRIP.AUTO: NEGATIVE
PH UR STRIP.AUTO: 6 [PH] (ref 5–8)
POCT INT CON NEG: NEGATIVE
POCT INT CON POS: POSITIVE
POCT URINE PREGNANCY TEST: POSITIVE
PROT UR QL STRIP: NEGATIVE MG/DL
RBC UR QL AUTO: NORMAL
SP GR UR STRIP.AUTO: >=1.03
UROBILINOGEN UR STRIP-MCNC: 0.2 MG/DL

## 2024-06-18 PROCEDURE — 81002 URINALYSIS NONAUTO W/O SCOPE: CPT | Performed by: NURSE PRACTITIONER

## 2024-06-18 PROCEDURE — 87086 URINE CULTURE/COLONY COUNT: CPT

## 2024-06-18 PROCEDURE — 3078F DIAST BP <80 MM HG: CPT | Performed by: NURSE PRACTITIONER

## 2024-06-18 PROCEDURE — 87491 CHLMYD TRACH DNA AMP PROBE: CPT

## 2024-06-18 PROCEDURE — 87480 CANDIDA DNA DIR PROBE: CPT

## 2024-06-18 PROCEDURE — 3074F SYST BP LT 130 MM HG: CPT | Performed by: NURSE PRACTITIONER

## 2024-06-18 PROCEDURE — 87510 GARDNER VAG DNA DIR PROBE: CPT

## 2024-06-18 PROCEDURE — 87591 N.GONORRHOEAE DNA AMP PROB: CPT

## 2024-06-18 PROCEDURE — 81025 URINE PREGNANCY TEST: CPT | Performed by: NURSE PRACTITIONER

## 2024-06-18 PROCEDURE — 99213 OFFICE O/P EST LOW 20 MIN: CPT | Performed by: NURSE PRACTITIONER

## 2024-06-18 PROCEDURE — 87660 TRICHOMONAS VAGIN DIR PROBE: CPT

## 2024-06-18 ASSESSMENT — FIBROSIS 4 INDEX: FIB4 SCORE: 1.17

## 2024-06-19 LAB
C TRACH DNA GENITAL QL NAA+PROBE: NEGATIVE
CANDIDA DNA VAG QL PROBE+SIG AMP: NEGATIVE
G VAGINALIS DNA VAG QL PROBE+SIG AMP: NEGATIVE
N GONORRHOEA DNA GENITAL QL NAA+PROBE: NEGATIVE
SPECIMEN SOURCE: NORMAL
T VAGINALIS DNA VAG QL PROBE+SIG AMP: NEGATIVE

## 2024-06-19 ASSESSMENT — ENCOUNTER SYMPTOMS
FEVER: 0
FLANK PAIN: 0
ABDOMINAL PAIN: 0
CHILLS: 0

## 2024-06-20 NOTE — PROGRESS NOTES
"Subjective:   Estelle Pappas is a 23 y.o. female who presents for Other (Vaginal burning after urination, H/O HSV)      Dysuria   This is a new problem. Episode onset: 4-5days; possible hsv outbreak has a hx. The problem occurs every urination. The quality of the pain is described as burning. The pain is moderate. There has been no fever. She is Sexually active. Associated symptoms include urgency. Pertinent negatives include no chills, discharge, flank pain, frequency, hematuria, hesitancy or possible pregnancy. She has tried nothing for the symptoms. The treatment provided no relief.       Review of Systems   Constitutional:  Negative for chills and fever.   Gastrointestinal:  Negative for abdominal pain.   Genitourinary:  Positive for dysuria and urgency. Negative for flank pain, frequency, hematuria and hesitancy.        Vaginal pain         Medications:    hydroxychloroquine Tabs    Allergies: Amoxicillin and Iodine contrast [diagnostic x-ray materials]    Problem List: Estelle Pappas does not have any pertinent problems on file.    Surgical History:  Past Surgical History:   Procedure Laterality Date    CATH PLACEMENT      5 days right jugular for plasma pharesis       Past Social Hx: Estelle Pappas  reports that she has never smoked. She has never used smokeless tobacco. She reports that she does not drink alcohol and does not use drugs.     Past Family Hx:  Estelle Pappas family history includes Hyperlipidemia in her father.     Problem list, medications, and allergies reviewed by myself today in Epic.     Objective:     /68   Pulse 64   Temp 36.4 °C (97.5 °F) (Temporal)   Resp 12   Ht 1.549 m (5' 1\")   Wt 49.9 kg (110 lb)   SpO2 98%   BMI 20.78 kg/m²     Physical Exam  Constitutional:       Appearance: Normal appearance. She is not ill-appearing or toxic-appearing.   HENT:      Head: Normocephalic.      Right Ear: External ear normal.      Left Ear: External ear " Patient: Wesley Yanes    Procedure Summary     Date: 03/23/22 Room / Location: [facility] END[facility] 4 / Formerly Self Memorial normal.      Nose: Nose normal.      Mouth/Throat:      Lips: Pink.   Eyes:      General: Lids are normal.   Pulmonary:      Effort: Pulmonary effort is normal. No accessory muscle usage.   Genitourinary:     Labia:         Right: Rash present.         Left: Rash present.           Comments: Vesiculated ulcerated lesions  Musculoskeletal:      Cervical back: Full passive range of motion without pain.   Neurological:      Mental Status: She is alert and oriented to person, place, and time.   Psychiatric:         Mood and Affect: Mood normal.         Thought Content: Thought content normal.         Assessment/Plan:     Diagnosis and associated orders:     1. Dysuria  POCT Urinalysis    POCT Pregnancy    VAGINAL PATHOGENS DNA PANEL    Chlamydia/GC, PCR (Genital/Anal swab)    URINE CULTURE(NEW)      2. HSV-2 (herpes simplex virus 2) infection        3. Less than 8 weeks gestation of pregnancy  Referral to OB/Gyn         Comments/MDM:   Results for orders placed or performed in visit on 06/18/24   POCT Urinalysis   Result Value Ref Range    POC Color Yellow Negative    POC Appearance Clear Negative    POC Glucose Negative Negative mg/dL    POC Bilirubin Negative Negative mg/dL    POC Ketones Trace Negative mg/dL    POC Specific Gravity >=1.030 <1.005 - >1.030    POC Blood Trace-lysed Negative    POC Urine PH 6.0 5.0 - 8.0    POC Protein Negative Negative mg/dL    POC Urobiligen 0.2 Negative (0.2) mg/dL    POC Nitrites Negative Negative    POC Leukocyte Esterase Trace Negative   POCT Pregnancy   Result Value Ref Range    POC Urine Pregnancy Test Positive     Internal Control Positive Positive     Internal Control Negative Negative        I personally reviewed prior external notes and prior test results pertinent to today's visit.  Patient having an HSV outbreak onset of symptoms greater than 48 hours discussed antiviral not indicated on today's exam.  Patient does have RBCs leukocytes I am concerned of possible underlying  infectious etiology patient having anaphylactic reaction to amoxicillin has never tried cephalosporins.  Patient is pregnant on today's exam patient in agreement to await for urine culture to determine antibiotic treatment referral to OB/GYN plan  Discussed management options, risks and benefits, and alternatives to treatment plan agreed upon.   Red flags discussed and indications to immediately call 911 or present to the Emergency Department.   Supportive care, differential diagnoses, and indications for immediate follow-up discussed with patient.    Patient expresses understanding and agrees to plan. Patient denies any other questions or concerns.                Please note that this dictation was created using voice recognition software. I have made a reasonable attempt to correct obvious errors, but I expect that there are errors of grammar and possibly content that I did not discover before finalizing the note.    This note was electronically signed by Asael ASHLEY.

## 2024-06-21 ENCOUNTER — HOSPITAL ENCOUNTER (INPATIENT)
Facility: MEDICAL CENTER | Age: 24
LOS: 1 days | DRG: 916 | End: 2024-06-22
Attending: EMERGENCY MEDICINE | Admitting: FAMILY MEDICINE

## 2024-06-21 DIAGNOSIS — T78.2XXA ANAPHYLACTIC REACTION, INITIAL ENCOUNTER: ICD-10-CM

## 2024-06-21 DIAGNOSIS — Z59.89 UNINSURED: ICD-10-CM

## 2024-06-21 DIAGNOSIS — T78.2XXA ANAPHYLAXIS, INITIAL ENCOUNTER: ICD-10-CM

## 2024-06-21 LAB
ANION GAP SERPL CALC-SCNC: 17 MMOL/L (ref 7–16)
BACTERIA UR CULT: NORMAL
BASOPHILS # BLD AUTO: 0.1 % (ref 0–1.8)
BASOPHILS # BLD: 0.01 K/UL (ref 0–0.12)
BUN SERPL-MCNC: 15 MG/DL (ref 8–22)
CALCIUM SERPL-MCNC: 8.9 MG/DL (ref 8.5–10.5)
CHLORIDE SERPL-SCNC: 103 MMOL/L (ref 96–112)
CO2 SERPL-SCNC: 15 MMOL/L (ref 20–33)
CREAT SERPL-MCNC: 0.88 MG/DL (ref 0.5–1.4)
EKG IMPRESSION: NORMAL
EOSINOPHIL # BLD AUTO: 0.04 K/UL (ref 0–0.51)
EOSINOPHIL NFR BLD: 0.2 % (ref 0–6.9)
ERYTHROCYTE [DISTWIDTH] IN BLOOD BY AUTOMATED COUNT: 40.2 FL (ref 35.9–50)
GFR SERPLBLD CREATININE-BSD FMLA CKD-EPI: 94 ML/MIN/1.73 M 2
GLUCOSE SERPL-MCNC: 91 MG/DL (ref 65–99)
HCT VFR BLD AUTO: 41.2 % (ref 37–47)
HGB BLD-MCNC: 14 G/DL (ref 12–16)
IMM GRANULOCYTES # BLD AUTO: 0.07 K/UL (ref 0–0.11)
IMM GRANULOCYTES NFR BLD AUTO: 0.4 % (ref 0–0.9)
LYMPHOCYTES # BLD AUTO: 0.33 K/UL (ref 1–4.8)
LYMPHOCYTES NFR BLD: 2 % (ref 22–41)
MCH RBC QN AUTO: 28.1 PG (ref 27–33)
MCHC RBC AUTO-ENTMCNC: 34 G/DL (ref 32.2–35.5)
MCV RBC AUTO: 82.6 FL (ref 81.4–97.8)
MONOCYTES # BLD AUTO: 0.28 K/UL (ref 0–0.85)
MONOCYTES NFR BLD AUTO: 1.7 % (ref 0–13.4)
NEUTROPHILS # BLD AUTO: 15.91 K/UL (ref 1.82–7.42)
NEUTROPHILS NFR BLD: 95.6 % (ref 44–72)
NRBC # BLD AUTO: 0 K/UL
NRBC BLD-RTO: 0 /100 WBC (ref 0–0.2)
PLATELET # BLD AUTO: 272 K/UL (ref 164–446)
PMV BLD AUTO: 10.1 FL (ref 9–12.9)
POTASSIUM SERPL-SCNC: 3.2 MMOL/L (ref 3.6–5.5)
RBC # BLD AUTO: 4.99 M/UL (ref 4.2–5.4)
SIGNIFICANT IND 70042: NORMAL
SITE SITE: NORMAL
SODIUM SERPL-SCNC: 135 MMOL/L (ref 135–145)
SOURCE SOURCE: NORMAL
WBC # BLD AUTO: 16.6 K/UL (ref 4.8–10.8)

## 2024-06-21 PROCEDURE — 700105 HCHG RX REV CODE 258: Performed by: EMERGENCY MEDICINE

## 2024-06-21 PROCEDURE — 93005 ELECTROCARDIOGRAM TRACING: CPT | Performed by: EMERGENCY MEDICINE

## 2024-06-21 PROCEDURE — 99285 EMERGENCY DEPT VISIT HI MDM: CPT

## 2024-06-21 PROCEDURE — 770006 HCHG ROOM/CARE - MED/SURG/GYN SEMI*

## 2024-06-21 PROCEDURE — 96374 THER/PROPH/DIAG INJ IV PUSH: CPT

## 2024-06-21 PROCEDURE — 96372 THER/PROPH/DIAG INJ SC/IM: CPT

## 2024-06-21 PROCEDURE — 96375 TX/PRO/DX INJ NEW DRUG ADDON: CPT

## 2024-06-21 PROCEDURE — 99222 1ST HOSP IP/OBS MODERATE 55: CPT | Mod: GC | Performed by: FAMILY MEDICINE

## 2024-06-21 PROCEDURE — 85025 COMPLETE CBC W/AUTO DIFF WBC: CPT

## 2024-06-21 PROCEDURE — 36415 COLL VENOUS BLD VENIPUNCTURE: CPT

## 2024-06-21 PROCEDURE — 80048 BASIC METABOLIC PNL TOTAL CA: CPT

## 2024-06-21 PROCEDURE — 700111 HCHG RX REV CODE 636 W/ 250 OVERRIDE (IP): Performed by: EMERGENCY MEDICINE

## 2024-06-21 PROCEDURE — 700101 HCHG RX REV CODE 250

## 2024-06-21 RX ORDER — EPINEPHRINE 1 MG/ML(1)
0.3 AMPUL (ML) INJECTION ONCE
Status: COMPLETED | OUTPATIENT
Start: 2024-06-21 | End: 2024-06-21

## 2024-06-21 RX ORDER — ONDANSETRON 2 MG/ML
4 INJECTION INTRAMUSCULAR; INTRAVENOUS EVERY 4 HOURS PRN
Status: DISCONTINUED | OUTPATIENT
Start: 2024-06-21 | End: 2024-06-22 | Stop reason: HOSPADM

## 2024-06-21 RX ORDER — SODIUM CHLORIDE, SODIUM LACTATE, POTASSIUM CHLORIDE, CALCIUM CHLORIDE 600; 310; 30; 20 MG/100ML; MG/100ML; MG/100ML; MG/100ML
1000 INJECTION, SOLUTION INTRAVENOUS ONCE
Status: DISCONTINUED | OUTPATIENT
Start: 2024-06-21 | End: 2024-06-21

## 2024-06-21 RX ORDER — SODIUM CHLORIDE AND POTASSIUM CHLORIDE 150; 900 MG/100ML; MG/100ML
INJECTION, SOLUTION INTRAVENOUS CONTINUOUS
Status: DISCONTINUED | OUTPATIENT
Start: 2024-06-21 | End: 2024-06-22

## 2024-06-21 RX ORDER — ENOXAPARIN SODIUM 100 MG/ML
40 INJECTION SUBCUTANEOUS DAILY
Status: DISCONTINUED | OUTPATIENT
Start: 2024-06-22 | End: 2024-06-22 | Stop reason: HOSPADM

## 2024-06-21 RX ORDER — ACETAMINOPHEN 325 MG/1
325 TABLET ORAL EVERY 4 HOURS PRN
COMMUNITY

## 2024-06-21 RX ORDER — SODIUM CHLORIDE 9 MG/ML
1000 INJECTION, SOLUTION INTRAVENOUS ONCE
Status: COMPLETED | OUTPATIENT
Start: 2024-06-21 | End: 2024-06-21

## 2024-06-21 RX ORDER — DIPHENHYDRAMINE HYDROCHLORIDE 50 MG/ML
50 INJECTION INTRAMUSCULAR; INTRAVENOUS ONCE
Status: COMPLETED | OUTPATIENT
Start: 2024-06-21 | End: 2024-06-21

## 2024-06-21 RX ORDER — METHYLPREDNISOLONE SODIUM SUCCINATE 125 MG/2ML
125 INJECTION, POWDER, LYOPHILIZED, FOR SOLUTION INTRAMUSCULAR; INTRAVENOUS ONCE
Status: COMPLETED | OUTPATIENT
Start: 2024-06-21 | End: 2024-06-21

## 2024-06-21 RX ORDER — SODIUM CHLORIDE 9 MG/ML
INJECTION, SOLUTION INTRAVENOUS CONTINUOUS
Status: DISCONTINUED | OUTPATIENT
Start: 2024-06-21 | End: 2024-06-21

## 2024-06-21 RX ORDER — ONDANSETRON 2 MG/ML
4 INJECTION INTRAMUSCULAR; INTRAVENOUS ONCE
Status: COMPLETED | OUTPATIENT
Start: 2024-06-21 | End: 2024-06-21

## 2024-06-21 RX ADMIN — ONDANSETRON 4 MG: 2 INJECTION INTRAMUSCULAR; INTRAVENOUS at 20:39

## 2024-06-21 RX ADMIN — DIPHENHYDRAMINE HYDROCHLORIDE 50 MG: 50 INJECTION, SOLUTION INTRAMUSCULAR; INTRAVENOUS at 20:03

## 2024-06-21 RX ADMIN — FAMOTIDINE 20 MG: 10 INJECTION, SOLUTION INTRAVENOUS at 20:04

## 2024-06-21 RX ADMIN — SODIUM CHLORIDE 1000 ML: 9 INJECTION, SOLUTION INTRAVENOUS at 20:08

## 2024-06-21 RX ADMIN — METHYLPREDNISOLONE SODIUM SUCCINATE 125 MG: 125 INJECTION, POWDER, FOR SOLUTION INTRAMUSCULAR; INTRAVENOUS at 20:04

## 2024-06-21 RX ADMIN — POTASSIUM CHLORIDE AND SODIUM CHLORIDE 1000 ML: 900; 150 INJECTION, SOLUTION INTRAVENOUS at 23:10

## 2024-06-21 RX ADMIN — EPINEPHRINE 0.3 MG: 1 INJECTION INTRAMUSCULAR; INTRAVENOUS; SUBCUTANEOUS at 19:58

## 2024-06-21 SDOH — ECONOMIC STABILITY - INCOME SECURITY: OTHER PROBLEMS RELATED TO HOUSING AND ECONOMIC CIRCUMSTANCES: Z59.89

## 2024-06-21 SDOH — ECONOMIC STABILITY: TRANSPORTATION INSECURITY
IN THE PAST 12 MONTHS, HAS LACK OF RELIABLE TRANSPORTATION KEPT YOU FROM MEDICAL APPOINTMENTS, MEETINGS, WORK OR FROM GETTING THINGS NEEDED FOR DAILY LIVING?: NO

## 2024-06-21 SDOH — ECONOMIC STABILITY: TRANSPORTATION INSECURITY
IN THE PAST 12 MONTHS, HAS THE LACK OF TRANSPORTATION KEPT YOU FROM MEDICAL APPOINTMENTS OR FROM GETTING MEDICATIONS?: NO

## 2024-06-21 ASSESSMENT — COGNITIVE AND FUNCTIONAL STATUS - GENERAL
SUGGESTED CMS G CODE MODIFIER MOBILITY: CH
SUGGESTED CMS G CODE MODIFIER DAILY ACTIVITY: CH
DAILY ACTIVITIY SCORE: 24
MOBILITY SCORE: 24

## 2024-06-21 ASSESSMENT — LIFESTYLE VARIABLES
ON A TYPICAL DAY WHEN YOU DRINK ALCOHOL HOW MANY DRINKS DO YOU HAVE: 0
ALCOHOL_USE: NO
TOTAL SCORE: 0
AVERAGE NUMBER OF DAYS PER WEEK YOU HAVE A DRINK CONTAINING ALCOHOL: 0
EVER FELT BAD OR GUILTY ABOUT YOUR DRINKING: NO
EVER HAD A DRINK FIRST THING IN THE MORNING TO STEADY YOUR NERVES TO GET RID OF A HANGOVER: NO
TOTAL SCORE: 0
HAVE YOU EVER FELT YOU SHOULD CUT DOWN ON YOUR DRINKING: NO
HAVE PEOPLE ANNOYED YOU BY CRITICIZING YOUR DRINKING: NO
TOTAL SCORE: 0
CONSUMPTION TOTAL: NEGATIVE
HOW MANY TIMES IN THE PAST YEAR HAVE YOU HAD 5 OR MORE DRINKS IN A DAY: 0

## 2024-06-21 ASSESSMENT — PATIENT HEALTH QUESTIONNAIRE - PHQ9
2. FEELING DOWN, DEPRESSED, IRRITABLE, OR HOPELESS: NOT AT ALL
SUM OF ALL RESPONSES TO PHQ9 QUESTIONS 1 AND 2: 0
1. LITTLE INTEREST OR PLEASURE IN DOING THINGS: NOT AT ALL

## 2024-06-21 ASSESSMENT — SOCIAL DETERMINANTS OF HEALTH (SDOH)
WITHIN THE LAST YEAR, HAVE YOU BEEN AFRAID OF YOUR PARTNER OR EX-PARTNER?: NO
WITHIN THE LAST YEAR, HAVE YOU BEEN KICKED, HIT, SLAPPED, OR OTHERWISE PHYSICALLY HURT BY YOUR PARTNER OR EX-PARTNER?: NO
IN THE PAST 12 MONTHS, HAS THE ELECTRIC, GAS, OIL, OR WATER COMPANY THREATENED TO SHUT OFF SERVICE IN YOUR HOME?: NO
WITHIN THE LAST YEAR, HAVE YOU BEEN HUMILIATED OR EMOTIONALLY ABUSED IN OTHER WAYS BY YOUR PARTNER OR EX-PARTNER?: NO
WITHIN THE PAST 12 MONTHS, YOU WORRIED THAT YOUR FOOD WOULD RUN OUT BEFORE YOU GOT THE MONEY TO BUY MORE: NEVER TRUE
WITHIN THE LAST YEAR, HAVE TO BEEN RAPED OR FORCED TO HAVE ANY KIND OF SEXUAL ACTIVITY BY YOUR PARTNER OR EX-PARTNER?: NO

## 2024-06-21 ASSESSMENT — FIBROSIS 4 INDEX: FIB4 SCORE: 1.17

## 2024-06-22 ENCOUNTER — PHARMACY VISIT (OUTPATIENT)
Dept: PHARMACY | Facility: MEDICAL CENTER | Age: 24
End: 2024-06-22
Payer: COMMERCIAL

## 2024-06-22 VITALS
OXYGEN SATURATION: 99 % | BODY MASS INDEX: 21.52 KG/M2 | SYSTOLIC BLOOD PRESSURE: 97 MMHG | HEIGHT: 61 IN | TEMPERATURE: 98.2 F | HEART RATE: 83 BPM | DIASTOLIC BLOOD PRESSURE: 57 MMHG | RESPIRATION RATE: 17 BRPM | WEIGHT: 114 LBS

## 2024-06-22 LAB
ALBUMIN SERPL BCP-MCNC: 3.5 G/DL (ref 3.2–4.9)
ALBUMIN/GLOB SERPL: 1.1 G/DL
ALP SERPL-CCNC: 71 U/L (ref 30–99)
ALT SERPL-CCNC: 16 U/L (ref 2–50)
ANION GAP SERPL CALC-SCNC: 13 MMOL/L (ref 7–16)
AST SERPL-CCNC: 21 U/L (ref 12–45)
BASOPHILS # BLD AUTO: 0.1 % (ref 0–1.8)
BASOPHILS # BLD: 0.01 K/UL (ref 0–0.12)
BILIRUB SERPL-MCNC: 0.3 MG/DL (ref 0.1–1.5)
BUN SERPL-MCNC: 10 MG/DL (ref 8–22)
CALCIUM ALBUM COR SERPL-MCNC: 8.2 MG/DL (ref 8.5–10.5)
CALCIUM SERPL-MCNC: 7.8 MG/DL (ref 8.5–10.5)
CHLORIDE SERPL-SCNC: 110 MMOL/L (ref 96–112)
CO2 SERPL-SCNC: 15 MMOL/L (ref 20–33)
CREAT SERPL-MCNC: 0.6 MG/DL (ref 0.5–1.4)
EOSINOPHIL # BLD AUTO: 0 K/UL (ref 0–0.51)
EOSINOPHIL NFR BLD: 0 % (ref 0–6.9)
ERYTHROCYTE [DISTWIDTH] IN BLOOD BY AUTOMATED COUNT: 40.5 FL (ref 35.9–50)
ERYTHROCYTE [DISTWIDTH] IN BLOOD BY AUTOMATED COUNT: 43.3 FL (ref 35.9–50)
GFR SERPLBLD CREATININE-BSD FMLA CKD-EPI: 129 ML/MIN/1.73 M 2
GLOBULIN SER CALC-MCNC: 3.2 G/DL (ref 1.9–3.5)
GLUCOSE SERPL-MCNC: 106 MG/DL (ref 65–99)
HCT VFR BLD AUTO: 31.2 % (ref 37–47)
HCT VFR BLD AUTO: 34.3 % (ref 37–47)
HGB BLD-MCNC: 10.1 G/DL (ref 12–16)
HGB BLD-MCNC: 11.7 G/DL (ref 12–16)
IMM GRANULOCYTES # BLD AUTO: 0.09 K/UL (ref 0–0.11)
IMM GRANULOCYTES NFR BLD AUTO: 0.5 % (ref 0–0.9)
LYMPHOCYTES # BLD AUTO: 0.31 K/UL (ref 1–4.8)
LYMPHOCYTES NFR BLD: 1.8 % (ref 22–41)
MCH RBC QN AUTO: 27.4 PG (ref 27–33)
MCH RBC QN AUTO: 28.1 PG (ref 27–33)
MCHC RBC AUTO-ENTMCNC: 32.4 G/DL (ref 32.2–35.5)
MCHC RBC AUTO-ENTMCNC: 34.1 G/DL (ref 32.2–35.5)
MCV RBC AUTO: 82.5 FL (ref 81.4–97.8)
MCV RBC AUTO: 84.8 FL (ref 81.4–97.8)
MONOCYTES # BLD AUTO: 0.25 K/UL (ref 0–0.85)
MONOCYTES NFR BLD AUTO: 1.5 % (ref 0–13.4)
NEUTROPHILS # BLD AUTO: 16.48 K/UL (ref 1.82–7.42)
NEUTROPHILS NFR BLD: 96.1 % (ref 44–72)
NRBC # BLD AUTO: 0 K/UL
NRBC BLD-RTO: 0 /100 WBC (ref 0–0.2)
PLATELET # BLD AUTO: 228 K/UL (ref 164–446)
PLATELET # BLD AUTO: 231 K/UL (ref 164–446)
PMV BLD AUTO: 10.3 FL (ref 9–12.9)
PMV BLD AUTO: 10.6 FL (ref 9–12.9)
POTASSIUM SERPL-SCNC: 3.9 MMOL/L (ref 3.6–5.5)
PROT SERPL-MCNC: 6.7 G/DL (ref 6–8.2)
RBC # BLD AUTO: 3.68 M/UL (ref 4.2–5.4)
RBC # BLD AUTO: 4.16 M/UL (ref 4.2–5.4)
SODIUM SERPL-SCNC: 138 MMOL/L (ref 135–145)
WBC # BLD AUTO: 17.1 K/UL (ref 4.8–10.8)
WBC # BLD AUTO: 19 K/UL (ref 4.8–10.8)

## 2024-06-22 PROCEDURE — 700111 HCHG RX REV CODE 636 W/ 250 OVERRIDE (IP)

## 2024-06-22 PROCEDURE — RXMED WILLOW AMBULATORY MEDICATION CHARGE

## 2024-06-22 PROCEDURE — 99238 HOSP IP/OBS DSCHRG MGMT 30/<: CPT | Mod: GC | Performed by: FAMILY MEDICINE

## 2024-06-22 PROCEDURE — 85025 COMPLETE CBC W/AUTO DIFF WBC: CPT

## 2024-06-22 PROCEDURE — 700102 HCHG RX REV CODE 250 W/ 637 OVERRIDE(OP)

## 2024-06-22 PROCEDURE — A9270 NON-COVERED ITEM OR SERVICE: HCPCS

## 2024-06-22 PROCEDURE — 85027 COMPLETE CBC AUTOMATED: CPT

## 2024-06-22 PROCEDURE — 700105 HCHG RX REV CODE 258

## 2024-06-22 PROCEDURE — 80053 COMPREHEN METABOLIC PANEL: CPT

## 2024-06-22 RX ORDER — PREDNISONE 20 MG/1
40 TABLET ORAL DAILY
Status: DISCONTINUED | OUTPATIENT
Start: 2024-06-22 | End: 2024-06-22 | Stop reason: HOSPADM

## 2024-06-22 RX ORDER — SODIUM CHLORIDE 9 MG/ML
500 INJECTION, SOLUTION INTRAVENOUS ONCE
Status: COMPLETED | OUTPATIENT
Start: 2024-06-22 | End: 2024-06-22

## 2024-06-22 RX ORDER — HYDROMORPHONE HYDROCHLORIDE 1 MG/ML
0.5 INJECTION, SOLUTION INTRAMUSCULAR; INTRAVENOUS; SUBCUTANEOUS
Status: DISCONTINUED | OUTPATIENT
Start: 2024-06-22 | End: 2024-06-22

## 2024-06-22 RX ORDER — PREDNISONE 20 MG/1
TABLET ORAL
Qty: 2 TABLET | Refills: 0 | Status: SHIPPED | OUTPATIENT
Start: 2024-06-22 | End: 2024-06-24

## 2024-06-22 RX ORDER — KETOROLAC TROMETHAMINE 15 MG/ML
15 INJECTION, SOLUTION INTRAMUSCULAR; INTRAVENOUS EVERY 6 HOURS PRN
Status: DISCONTINUED | OUTPATIENT
Start: 2024-06-22 | End: 2024-06-22 | Stop reason: HOSPADM

## 2024-06-22 RX ORDER — DIPHENHYDRAMINE HCL 25 MG
25 TABLET ORAL EVERY 6 HOURS PRN
Qty: 30 TABLET | Refills: 0 | Status: SHIPPED | OUTPATIENT
Start: 2024-06-22

## 2024-06-22 RX ORDER — EPINEPHRINE 0.3 MG/.3ML
0.3 INJECTION SUBCUTANEOUS ONCE
Qty: 2 EACH | Refills: 1 | Status: SHIPPED | OUTPATIENT
Start: 2024-06-22 | End: 2024-06-23

## 2024-06-22 RX ORDER — ACETAMINOPHEN 10 MG/ML
1000 INJECTION, SOLUTION INTRAVENOUS EVERY 6 HOURS
Status: DISCONTINUED | OUTPATIENT
Start: 2024-06-22 | End: 2024-06-22

## 2024-06-22 RX ORDER — DIPHENHYDRAMINE HYDROCHLORIDE 50 MG/ML
25 INJECTION INTRAMUSCULAR; INTRAVENOUS EVERY 6 HOURS PRN
Status: DISCONTINUED | OUTPATIENT
Start: 2024-06-22 | End: 2024-06-22

## 2024-06-22 RX ORDER — DIPHENHYDRAMINE HCL 25 MG
25 TABLET ORAL EVERY 6 HOURS PRN
Status: DISCONTINUED | OUTPATIENT
Start: 2024-06-22 | End: 2024-06-22 | Stop reason: HOSPADM

## 2024-06-22 RX ORDER — ACETAMINOPHEN 325 MG/1
650 TABLET ORAL EVERY 4 HOURS PRN
Status: DISCONTINUED | OUTPATIENT
Start: 2024-06-22 | End: 2024-06-22 | Stop reason: HOSPADM

## 2024-06-22 RX ADMIN — SODIUM CHLORIDE 500 ML: 9 INJECTION, SOLUTION INTRAVENOUS at 08:15

## 2024-06-22 RX ADMIN — ACETAMINOPHEN 1000 MG: 1000 INJECTION INTRAVENOUS at 03:06

## 2024-06-22 RX ADMIN — ACETAMINOPHEN 650 MG: 325 TABLET, FILM COATED ORAL at 10:24

## 2024-06-22 RX ADMIN — DIPHENHYDRAMINE HYDROCHLORIDE 25 MG: 50 INJECTION, SOLUTION INTRAMUSCULAR; INTRAVENOUS at 02:52

## 2024-06-22 RX ADMIN — PREDNISONE 40 MG: 20 TABLET ORAL at 11:11

## 2024-06-22 ASSESSMENT — FIBROSIS 4 INDEX
FIB4 SCORE: 0.92
FIB4 SCORE: 0.92

## 2024-06-22 ASSESSMENT — PAIN DESCRIPTION - PAIN TYPE
TYPE: ACUTE PAIN
TYPE: ACUTE PAIN

## 2024-06-22 ASSESSMENT — SOCIAL DETERMINANTS OF HEALTH (SDOH)
IN THE PAST 12 MONTHS, HAS THE ELECTRIC, GAS, OIL, OR WATER COMPANY THREATENED TO SHUT OFF SERVICE IN YOUR HOME?: NO
WITHIN THE PAST 12 MONTHS, THE FOOD YOU BOUGHT JUST DIDN'T LAST AND YOU DIDN'T HAVE MONEY TO GET MORE: NEVER TRUE
WITHIN THE PAST 12 MONTHS, YOU WORRIED THAT YOUR FOOD WOULD RUN OUT BEFORE YOU GOT THE MONEY TO BUY MORE: NEVER TRUE

## 2024-06-22 NOTE — CARE PLAN
Problem: Knowledge Deficit - Standard  Goal: Patient and family/care givers will demonstrate understanding of plan of care, disease process/condition, diagnostic tests and medications  Outcome: Progressing     Problem: Pain - Standard  Goal: Alleviation of pain or a reduction in pain to the patient’s comfort goal  Outcome: Progressing     Problem: Pain - Standard  Goal: Alleviation of pain or a reduction in pain to the patient’s comfort goal  Outcome: Progressing   The patient is Stable - Low risk of patient condition declining or worsening    Shift Goals  Clinical Goals: monitor allergic reaction, patent airway  Patient Goals: go home soon  Family Goals: eat    Progress made toward(s) clinical / shift goals:  effective airway, monitor VS, swelling    Patient is not progressing towards the following goals:

## 2024-06-22 NOTE — PROGRESS NOTES
Pt has been medically cleared for discharge. IV removed. Mother at bedside for ride home. All belongings in possession, order placed for discharge lounge. Confirmed with meds to beds that epi pen is available upon dc.

## 2024-06-22 NOTE — ED NOTES
Medication history reviewed with patient at bedside.   Med rec is complete  Allergies reviewed.     Patient has not had any outpatient antibiotics in the last 30 days.   Anticoagulants: No    Rosa Kennedy

## 2024-06-22 NOTE — PROGRESS NOTES
Assumed care of patient. She is sleeping in bed with even chest rise noted. Lips swollen, VSS overnight with stable oxygenation. Call light in reach

## 2024-06-22 NOTE — ED PROVIDER NOTES
ED PHYSICIAN NOTE    CHIEF COMPLAINT  Chief Complaint   Patient presents with    Allergic Reaction     Pt reports allergic reaction she had taken a new medication today at 1300 she took a nap and woke up 2 hours later with swollen lips and tightness in her throat.        EXTERNAL RECORDS REVIEWED  Outpatient Notes patient in urgent care few days ago with dysuria.  Urinalysis negative gonorrhea chlamydia trichomonas negative    HPI/ROS      Estelle Pappas is a 23 y.o. female who presents after taking occasions for .  Patient in early stage of pregnancy.  She was given ibuprofen, promethazine and misoprostol.  She took these medications at about 1 PM.  Woke up a few hours ago with swollen lips tightness in her throat.  It is gradually been worsening.  She feels hot and flushed.  She is nauseous.  Reports she is never taken promethazine or misoprostol in the past.  She has taken ibuprofen.    PAST MEDICAL HISTORY  Past Medical History:   Diagnosis Date    Alopecia     Anemia     Mixed connective tissue disease (HCC)     Raynaud phenomenon     SLE (systemic lupus erythematosus) (HCC)     TTP (thrombotic thrombocytopenic purpura) (HCC)     TTP (thrombotic thrombocytopenic purpura) (HCC)     Vitiligo        SOCIAL HISTORY  Social History     Tobacco Use    Smoking status: Never    Smokeless tobacco: Never   Vaping Use    Vaping status: Never Used   Substance Use Topics    Alcohol use: No    Drug use: No       CURRENT MEDICATIONS  Home Medications       Reviewed by Annelise Hennessy R.N. (Registered Nurse) on 24 at 1953  Med List Status: Not Addressed     Medication Last Dose Status   hydroxychloroquine (PLAQUENIL) 200 MG Tab  Active                    ALLERGIES  Allergies   Allergen Reactions    Amoxicillin Hives, Itching and Swelling     Patient stated    Iodine Contrast [Diagnostic X-Ray Materials] Rash and Swelling     Developed after contrast CT       PHYSICAL EXAM  VITAL SIGNS: BP 99/41    "Pulse (!) 129   Temp 36.8 °C (98.3 °F) (Temporal)   Resp 18   Ht 1.549 m (5' 1\")   Wt 51.8 kg (114 lb 3.2 oz)   SpO2 97%   BMI 21.58 kg/m²    Constitutional: Awake and alert  HENT: Very swollen face and lips.  Tongue mildly swollen.  Posterior pharynx visible.  Eyes: Normal inspection  Neck: Grossly normal range of motion.  Cardiovascular: Elevated heart rate, Normal rhythm.  Symmetric peripheral pulses.   Thorax & Lungs: No respiratory distress, No wheezing, No rales, No rhonchi, No chest tenderness.   Abdomen: Bowel sounds normal, soft, non-distended, nontender, no mass  Skin: Diffuse blanching erythema  Back: No tenderness, No CVA tenderness.   Extremities: No clubbing, cyanosis, edema, no Homans or cords.  Neurologic: Grossly normal   Psychiatric: Normal for situation     DIAGNOSTIC STUDIES / PROCEDURES  LABS/EKG  Results for orders placed or performed during the hospital encounter of 06/21/24   CBC WITH DIFFERENTIAL   Result Value Ref Range    WBC 16.6 (H) 4.8 - 10.8 K/uL    RBC 4.99 4.20 - 5.40 M/uL    Hemoglobin 14.0 12.0 - 16.0 g/dL    Hematocrit 41.2 37.0 - 47.0 %    MCV 82.6 81.4 - 97.8 fL    MCH 28.1 27.0 - 33.0 pg    MCHC 34.0 32.2 - 35.5 g/dL    RDW 40.2 35.9 - 50.0 fL    Platelet Count 272 164 - 446 K/uL    MPV 10.1 9.0 - 12.9 fL    Neutrophils-Polys 95.60 (H) 44.00 - 72.00 %    Lymphocytes 2.00 (L) 22.00 - 41.00 %    Monocytes 1.70 0.00 - 13.40 %    Eosinophils 0.20 0.00 - 6.90 %    Basophils 0.10 0.00 - 1.80 %    Immature Granulocytes 0.40 0.00 - 0.90 %    Nucleated RBC 0.00 0.00 - 0.20 /100 WBC    Neutrophils (Absolute) 15.91 (H) 1.82 - 7.42 K/uL    Lymphs (Absolute) 0.33 (L) 1.00 - 4.80 K/uL    Monos (Absolute) 0.28 0.00 - 0.85 K/uL    Eos (Absolute) 0.04 0.00 - 0.51 K/uL    Baso (Absolute) 0.01 0.00 - 0.12 K/uL    Immature Granulocytes (abs) 0.07 0.00 - 0.11 K/uL    NRBC (Absolute) 0.00 K/uL   BASIC METABOLIC PANEL   Result Value Ref Range    Sodium 135 135 - 145 mmol/L    Potassium 3.2 (L) " 3.6 - 5.5 mmol/L    Chloride 103 96 - 112 mmol/L    Co2 15 (L) 20 - 33 mmol/L    Glucose 91 65 - 99 mg/dL    Bun 15 8 - 22 mg/dL    Creatinine 0.88 0.50 - 1.40 mg/dL    Calcium 8.9 8.5 - 10.5 mg/dL    Anion Gap 17.0 (H) 7.0 - 16.0   ESTIMATED GFR   Result Value Ref Range    GFR (CKD-EPI) 94 >60 mL/min/1.73 m 2   EKG   Result Value Ref Range    Report       [facility] Emergency Dept[facility]    Test Date:  2024  Pt Name:    BERNARDINO SCOTT       Department: ER  MRN:        [id number]                      Room:       RD 11  Gender:     Female                       Technician: [id number]  :        2000                   Requested By:KATE MENDEZ  Order #:    [id number]                    Reading MD: KATE MENDEZ MD    Measurements  Intervals                                Axis  Rate:       131                          P:          60  KY:         135                          QRS:        43  QRSD:       78                           T:          -39  QT:         296  QTc:        437    Interpretive Statements  Sinus tachycardia  Borderline repolarization abnormality  Compared to ECG 2024 03:51:58  T-wave abnormality no longer present  ST (T wave) deviation no longer present  Electronically Signed On 2024 21:02:55 PDT by KATE MENDEZ MD        I have independently interpreted this EKG as documented above    Rhythm strip interpretation-sinus tachycardia      COURSE & MEDICAL DECISION MAKING    INITIAL ASSESSMENT, COURSE AND PLAN  Care Narrative: Patient presents with anaphylaxis.  Remarkable swelling of her lips.  Feels that her throat is closing.  Generalized rash.  She is tachycardic.  No hypoxia or stridor.  Emergency medications ordered.  Patient given IM epinephrine.  IV established.  Patient given Solu-Medrol, Pepcid, Benadryl.  Ordered 1 L IV fluid.    Patient observed in the emergency department.  No change in swelling of her lips but she had symptomatic improvement in  her throat.  She is breathing well.  She did get nauseous and vomit.  She was given Zofran.  She remains tachycardic.    Given the degree of swelling of her face lips as well as swelling in her throat I would like to observe patient in the hospital overnight.  Hospitalist vanessa.    Discussed case with Melissa        DISPOSITION AND DISCUSSIONS  I have discussed management of the patient with the following physicians and SARA's:  as noted above    Discussion of management with other QHP or appropriate source(s): Pharmacy for medications      FINAL IMPRESSION  1.  Anaphylaxis  2.  Early pregnancy with medical     CRITICAL CARE  The very real possibilty of a deterioration of this patient's condition required the highest level of my preparedness for sudden, emergent intervention.  I provided critical care services, which included medication orders, frequent reevaluations of the patient's condition and response to treatment, ordering and reviewing test results, and discussing the case with various consultants.  The critical care time associated with the care of the patient was 30 minutes. Review chart for interventions. This time is exclusive of any other billable procedures.       This dictation was created using voice recognition software. The accuracy of the dictation is limited to the abilities of the software. I expect there may be some errors of grammar and possibly content. The nursing notes were reviewed and certain aspects of this information were incorporated into this note.    Electronically signed by: Rasheed Smith M.D., 2024

## 2024-06-22 NOTE — ASSESSMENT & PLAN NOTE
Patient with new anaphylaxis and angioedema in setting of taking ibuprofen, promethazine, and vaginal misoprostol for first trimester .  Has had ibuprofen in the past without anaphylactic reaction.  Promethazine and Vasoprost old both are new medications, prescribed through [facility] for the purpose of medical .  Had significant improvement in lip and mouth swelling and throat tightening after receiving IV Solu-Medrol 125 mcg, IV Benadryl, and Epinephrine in the ED. Tolerated liquids today in AM.  - Continue with p.o. Benadryl 25 mg every 6 hours as needed  - Begin 3-day total course of oral prednisone 40 mg  - Continue to monitor

## 2024-06-22 NOTE — ED TRIAGE NOTES
Chief Complaint   Patient presents with    Allergic Reaction     Pt reports allergic reaction she had taken a new medication today at 1300 she took a nap and woke up 2 hours later with swollen lips and tightness in her throat.      Pt ambulatory to triage for above complaint. Pt reports taking benadryl at 1700 today with no relief in swelling, redness noted to her arms and abdomen.      Pt is alert/oriented and follows commands. Pt speaking in full sentences and responds appropriately to questions. No acute distress noted in triage and respirations are even and unlabored.     Pt placed in lobby and educated on triage process. Pt encouraged to alert staff for any changes in condition.

## 2024-06-22 NOTE — PROGRESS NOTES
4 Eyes Skin Assessment Completed by EMILIANA Yuan and EMILIANA Lemos.    Head WDL  Ears WDL  Nose WDL  Mouth swelling  Neck WDL  Breast/Chest WDL  Shoulder Blades WDL  Spine WDL  (R) Arm/Elbow/Hand WDL  (L) Arm/Elbow/Hand WDL  Abdomen WDL  Groin WDL  Scrotum/Coccyx/Buttocks WDL  (R) Leg WDL  (L) Leg WDL  (R) Heel/Foot/Toe WDL  (L) Heel/Foot/Toe WDL          Devices In Places NA      Interventions In Place N/A    Possible Skin Injury No    Pictures Uploaded Into Epic N/A  Wound Consult Placed N/A  RN Wound Prevention Protocol Ordered No

## 2024-06-22 NOTE — PROGRESS NOTES
Patient to be discharged today - patient aware and agreeable to plan. D/c instructions reviewed with patient - ?'s/concerns answered. No piv, meds to beds handed to patient.

## 2024-06-22 NOTE — H&P
[facility] MEDICINE HISTORY AND PHYSICAL     PATIENT ID:  NAME:  Estelle Pappas  MRN:               [id number]  YOB: 2000    Date of Admission: 2024     Attending: Mireya Shankar M.d.  Primary Care Physician:  Kevyn Leal M.D.    CC:    Chief Complaint   Patient presents with    Allergic Reaction     Pt reports allergic reaction she had taken a new medication today at 1300 she took a nap and woke up 2 hours later with swollen lips and tightness in her throat.        HPI: Estelle Pappas is a 23 y.o. female with a history of lupus who presented to the ED after she experienced swollen lips and tingling and also difficulty swallowing upon waking up from a nap this afternoon. Patient reports that 1:00 PM, she took ibuprofen and promethazine, at 1:30 PM she used misoprostol (vaginally for  of 5-week old pregnancy) before taking a nap.  An hour later, she was awoken by tingling in the lips, which were red and swollen, and difficulty swallowing.  Patient states that this is the first time she has experienced such reaction and symptoms. She states she has taken ibuprofen in the past, and has tolerated it well.  For nausea, she had taken Zofran, which was also well tolerated.  She reports that this is the first time she's taken promethazine as well as misoprostol.      ERCourse:  In the ED, patient was tachycardic with a HR of 129, tachypneic, (RR of 25), as the rest of her vitals remained stable. BMP notable for decreased serum potassium (3.2), and bicarb (15), and increase anion gap (17). CBC showed elevated WBC (16.6), neutrophils (95.6) and absolute neutrophils (15.91). Anaphylactic protocol was initiated.  Patient received a liter of NS bolus, Solu-Medrol injection, Benadryl, and epinephrine.  She also received a dose of Zofran and famotidine. Patient is admitted for continued monitoring.    REVIEW OF SYSTEMS:   Ten systems reviewed and were negative except as noted in  the [facility].                PAST MEDICAL HISTORY:  Past Medical History:   Diagnosis Date    Alopecia     Anemia     Mixed connective tissue disease (HCC)     Raynaud phenomenon     SLE (systemic lupus erythematosus) (HCC)     TTP (thrombotic thrombocytopenic purpura) (HCC)     TTP (thrombotic thrombocytopenic purpura) (HCC)     Vitiligo        PAST SURGICAL HISTORY:  Past Surgical History:   Procedure Laterality Date    CATH PLACEMENT      5 days right jugular for plasma pharesis       FAMILY HISTORY:  Family History   Problem Relation Age of Onset    Hyperlipidemia Father     Cancer Neg Hx     Diabetes Neg Hx     Heart Disease Neg Hx     Stroke Neg Hx        SOCIAL HISTORY:   Social History     Socioeconomic History    Marital status: Single     Spouse name: Not on file    Number of children: Not on file    Years of education: Not on file    Highest education level: Associate degree: occupational, technical, or vocational program   Occupational History    Not on file   Tobacco Use    Smoking status: Never    Smokeless tobacco: Never   Vaping Use    Vaping status: Never Used   Substance and Sexual Activity    Alcohol use: No    Drug use: No    Sexual activity: Not Currently     Birth control/protection: Abstinence   Other Topics Concern    Behavioral problems Not Asked    Interpersonal relationships Not Asked    Sad or not enjoying activities Not Asked    Suicidal thoughts Not Asked    Poor school performance Not Asked    Reading difficulties Not Asked    Speech difficulties Not Asked    Writing difficulties Not Asked    Inadequate sleep Not Asked    Excessive TV viewing Not Asked    Excessive video game use Not Asked    Inadequate exercise Not Asked    Sports related Not Asked    Poor diet Not Asked    Family concerns for drug/alcohol abuse Not Asked    Poor oral hygiene Not Asked    Bike safety Not Asked    Family concerns vehicle safety Not Asked   Social History Narrative    Not on file     Social Determinants of  Health     Financial Resource Strain: Low Risk  (12/22/2022)    Overall Financial Resource Strain (CARDIA)     Difficulty of Paying Living Expenses: Not hard at all   Food Insecurity: No Food Insecurity (12/22/2022)    Hunger Vital Sign     Worried About Running Out of Food in the Last Year: Never true     Ran Out of Food in the Last Year: Never true   Transportation Needs: No Transportation Needs (12/22/2022)    PRAPARE - Transportation     Lack of Transportation (Medical): No     Lack of Transportation (Non-Medical): No   Physical Activity: Sufficiently Active (12/22/2022)    Exercise Vital Sign     Days of Exercise per Week: 3 days     Minutes of Exercise per Session: 60 min   Stress: No Stress Concern Present (12/22/2022)    [employer] - Occupational Stress Questionnaire     Feeling of Stress : Not at all   Social Connections: Unknown (12/22/2022)    Social Connection and Isolation Panel [NHANES]     Frequency of Communication with Friends and Family: More than three times a week     Frequency of Social Gatherings with Friends and Family: Twice a week     Attends Evangelical Services: Never     Active Member of Clubs or Organizations: No     Attends Club or Organization Meetings: Never     Marital Status: Patient declined   Intimate Partner Violence: Not on file   Housing Stability: Low Risk  (12/22/2022)    Housing Stability Vital Sign     Unable to Pay for Housing in the Last Year: No     Number of Places Lived in the Last Year: 1     Unstable Housing in the Last Year: No       DIET:   Orders Placed This Encounter   Procedures    Diet NPO Restrict to: Ice Chips     Standing Status:   Standing     Number of Occurrences:   1     Order Specific Question:   Diet NPO Restrict to:     Answer:   Ice Chips [2]       ALLERGIES:  Allergies   Allergen Reactions    Amoxicillin Hives, Itching and Swelling     Patient stated    Misoprostol Anaphylaxis     Took promethazine and misoprostol  together and developed severe anaphylactic reaction. Unclear which caused the reaction.     Promethazine Anaphylaxis     Patient took misoprostol and promethazine together and developed severe anaphylactic reaction. Unclear which caused the reaction.    Iodine Contrast [Diagnostic X-Ray Materials] Rash and Swelling     Developed after contrast CT       OUTPATIENT MEDICATIONS:    Current Facility-Administered Medications:     [START ON 2024] enoxaparin (Lovenox) inj 40 mg, 40 mg, Subcutaneous, DAILY AT 1800, Ash Torres M.D.    ondansetron (Zofran) syringe/vial injection 4 mg, 4 mg, Intravenous, Q4HRS PRN, Ash Torres M.D.    0.9 % NaCl with KCl 20 mEq infusion, , Intravenous, Continuous, Ash Torres M.D.    Current Outpatient Medications:     acetaminophen (TYLENOL) 325 MG Tab, Take 325 mg by mouth every four hours as needed for Mild Pain., Disp: , Rfl:     hydroxychloroquine (PLAQUENIL) 200 MG Tab, 400mg (2 tabs) on  and Friday and 200mg every other day of the week, Disp: 135 Tablet, Rfl: 1    PHYSICAL EXAM:  Vitals:    242 247 247 24 2239   BP: 105/59 101/59 92/50 101/56   Pulse: (!) 116 (!) 112 (!) 106 (!) 101   Resp: (!) 24 (!) 25 12 19   Temp:    36.5 °C (97.7 °F)   TempSrc:    Tympanic   SpO2: 98% 98% 99% 99%   Weight:       Height:       , Temp (24hrs), Av.8 °C (98.3 °F), Min:36.8 °C (98.3 °F), Max:36.8 °C (98.3 °F)  , Pulse Oximetry: 99 %    General: Pt resting in NAD, cooperative   Skin:  Pink, warm and dry.  +rashes  HEENT: NC/AT. PERRL. EOMI. MMM. No nasal discharge. Erythematous and edematous upper and lower lips  Neck:  Supple without lymphadenopathy or rigidity.  Lungs:  Symmetrical.  CTAB with no adventitious breath sounds.  Good air movement   Cardiovascular:  Normal S1/S2, RRR without M/R/G.  Abdomen:  BS+, Soft, NT/ND. No masses noted.  Extremities:  Full range of motion. No gross deformities noted. 2+ pulses in  all extremities. No C/C/E   CNS:  A&Ox4, follows commands, Strength 5/5 in all extremities.         LAB TESTS:   Admission on 06/21/2024   Component Date Value Ref Range Status    WBC 06/21/2024 16.6 (H)  4.8 - 10.8 K/uL Final    RBC 06/21/2024 4.99  4.20 - 5.40 M/uL Final    Hemoglobin 06/21/2024 14.0  12.0 - 16.0 g/dL Final    Hematocrit 06/21/2024 41.2  37.0 - 47.0 % Final    MCV 06/21/2024 82.6  81.4 - 97.8 fL Final    MCH 06/21/2024 28.1  27.0 - 33.0 pg Final    MCHC 06/21/2024 34.0  32.2 - 35.5 g/dL Final    RDW 06/21/2024 40.2  35.9 - 50.0 fL Final    Platelet Count 06/21/2024 272  164 - 446 K/uL Final    MPV 06/21/2024 10.1  9.0 - 12.9 fL Final    Neutrophils-Polys 06/21/2024 95.60 (H)  44.00 - 72.00 % Final    Lymphocytes 06/21/2024 2.00 (L)  22.00 - 41.00 % Final    Monocytes 06/21/2024 1.70  0.00 - 13.40 % Final    Eosinophils 06/21/2024 0.20  0.00 - 6.90 % Final    Basophils 06/21/2024 0.10  0.00 - 1.80 % Final    Immature Granulocytes 06/21/2024 0.40  0.00 - 0.90 % Final    Nucleated RBC 06/21/2024 0.00  0.00 - 0.20 /100 WBC Final    Neutrophils (Absolute) 06/21/2024 15.91 (H)  1.82 - 7.42 K/uL Final    Includes immature neutrophils, if present.    Lymphs (Absolute) 06/21/2024 0.33 (L)  1.00 - 4.80 K/uL Final    Monos (Absolute) 06/21/2024 0.28  0.00 - 0.85 K/uL Final    Eos (Absolute) 06/21/2024 0.04  0.00 - 0.51 K/uL Final    Baso (Absolute) 06/21/2024 0.01  0.00 - 0.12 K/uL Final    Immature Granulocytes (abs) 06/21/2024 0.07  0.00 - 0.11 K/uL Final    NRBC (Absolute) 06/21/2024 0.00  K/uL Final    Sodium 06/21/2024 135  135 - 145 mmol/L Final    Potassium 06/21/2024 3.2 (L)  3.6 - 5.5 mmol/L Final    Chloride 06/21/2024 103  96 - 112 mmol/L Final    Co2 06/21/2024 15 (L)  20 - 33 mmol/L Final    Glucose 06/21/2024 91  65 - 99 mg/dL Final    Bun 06/21/2024 15  8 - 22 mg/dL Final    Creatinine 06/21/2024 0.88  0.50 - 1.40 mg/dL Final    Calcium 06/21/2024 8.9  8.5 - 10.5 mg/dL Final    Anion Gap  2024 17.0 (H)  7.0 - 16.0 Final    Report 2024    Final                    Value:[facility]    Test Date:  2024  Pt Name:    BERNARDINO SCOTT       Department: ER  MRN:        [id number]                      Room:       RD 11  Gender:     Female                       Technician: [id number]  :        2000                   Requested By:KATE MENDEZ  Order #:    [id number]                    Reading MD: KATE MENDEZ MD    Measurements  Intervals                                Axis  Rate:       131                          P:          60  VT:         135                          QRS:        43  QRSD:       78                           T:          -39  QT:         296  QTc:        437    Interpretive Statements  Sinus tachycardia  Borderline repolarization abnormality  Compared to ECG 2024 03:51:58  T-wave abnormality no longer present  ST (T wave) deviation no longer present  Electronically Signed On 2024 21:02:55 PDT by KATE MENDEZ MD      GFR (CKD-EPI) 2024 94  >60 mL/min/1.73 m 2 Final    Comment: Estimated Glomerular Filtration Rate is calculated using  race neutral CKD-EPI  equation per NKF-ASN recommendations.          CULTURES:   Results       ** No results found for the last 168 hours. **            IMAGES:  No orders to display       CONSULTS:   None     ASSESSMENT/PLAN: 23 y.o. female admitted for     #Anaphylactic reaction  #Tachycardia  Patient presents with initial anaphylactic reaction after taking combination of ibuprofen, promethazine, and misoprostol (vaginally). Physical exam notable for erythematosus and edematous lips. She admits improved tingling, swelling in the lips, decreased throat- tightness, tingling and pain and improved swallowing since receiving Benadryl, steroid and epinephrine and IVF in the ED. HR has also improved from 129, currently at 101. She is in no acute respiratory distress, satting at  99% in room air.  - Continue to monitor  - Patient to remain NPO  - Start IV fluids, NS with KCl  - Aspiration precaution in place  - IV Benadryl prn for itching  - Scheduled IV Tylenol, q6hr. Need to contact pharmacy to order more doses if needed.  - Can consider suppository route as condition improves. IV Dilaudid as needed for pain.    Leukocytosis  CBC notable for elevated WBC (16.2) likely stress induced.  Patient is afebrile.  - Continue to monitor on CBC    #Hypokalemia   BMP notable for decreased serum potassium level of 3.2.  - Start IV fluids, NS with 20mEq KCl  - Continue to monitor on BMP    #Metabolic acidosis with increased anion gap  BMP notable for decreased bicarb of 15, and increased AG (17).  - Continue IV fluids  - Continue to monitor on BMP    #   Patient had taken misoprostol today for abortive purposes. She admits some vaginal discharge.  - Continue to monitor vitals  - Scheduled IV Tylenol, q6hr. Need to contact pharmacy to order more doses if needed.  - Can consider suppository route as condition improves. IV Dilaudid as needed for pain.    #Nausea  - IV Zofran prn    Core Measures:  Fluids: NS with KCl  Lines: PIV  Abx: None  Diet: NPO  PPX: Lovenox  DISPO: Inpatient    CODE STATUS: Full      Dawit Torres, PGY-2  [facility]

## 2024-06-22 NOTE — ED NOTES
Pt sts she took promethazine, ibuprofen, and misoprostel and took a nap. Pt woke up 2 hours later with lip swelling and a tightness in her throat. She is ambulatory, A&O, and maintaining her own airway. Dr. Smith at bedside and anaphyalaxis protocol ordered. Pharmacy at bedside to draw up labs. Pt connected to BS monitor and SO at bedside.

## 2024-06-22 NOTE — DISCHARGE SUMMARY
Admit Date:  2024       Discharge Date:   24    Service:   [facility] Family Medicine Inpatient Team  Attending Physician(s):   Mireya Shankar M.D.       Senior Resident(s):   Barbara Jorgensen D.O.  Stevie Resident(s):   Brigida Resendiz M.D.    Primary Diagnosis:   Anaphylaxis with Angioedema    Secondary Diagnoses:                Metabolic Acidosis- Resolved  First Trimester Pharmacologic -completed prior to admission  Lupus    HPI (Per Dr. Torres's Admission H&P):     Estelle Pappas is a 23 y.o. female with a history of lupus who presented to the ED after she experienced swollen lips and tingling and also difficulty swallowing upon waking up from a nap this afternoon. Patient reports that 1:00 PM, she took ibuprofen and promethazine, at 1:30 PM she used misoprostol (vaginally for  of 5-week old pregnancy) before taking a nap.  An hour later, she was awoken by tingling in the lips, which were red and swollen, and difficulty swallowing.  Patient states that this is the first time she has experienced such reaction and symptoms. She states she has taken ibuprofen in the past, and has tolerated it well.  For nausea, she had taken Zofran, which was also well tolerated.  She reports that this is the first time she's taken promethazine as well as misoprostol.       ERCourse:  In the ED, patient was tachycardic with a HR of 129, tachypneic, (RR of 25), as the rest of her vitals remained stable. BMP notable for decreased serum potassium (3.2), and bicarb (15), and increase anion gap (17). CBC showed elevated WBC (16.6), neutrophils (95.6) and absolute neutrophils (15.91). Anaphylactic protocol was initiated.  Patient received a liter of NS bolus, Solu-Medrol injection, Benadryl, and epinephrine.  She also received a dose of Zofran and famotidine. Patient is admitted for continued monitoring.      Hospital Summary (Brief Narrative):         Estelle Pappas was admitted to [facility] on 2024 for  management of anaphylaxis and angioedema in setting of new medications taken for first trimester  prescribed through [facility]. Unable to determine at this time if inciting medication was promethazine or misoprostol as both were new medications for her. Patient had improvement in lip and mouth swelling by the morning after receiving Solu-Medrol, Benadryl, and Epinephrine in ED. Continued with IV Benadryl 25mg q6h prn for swelling. Patient had increased vaginal bleeding initially on arrival but states this has steadily decreased to this morning. She has follow-up already scheduled with [facility] for this week. Patient continued to have symptomatic improvement, was tolerating full liquid diet, and had no signs of respiratory distress or worsening edema. Medically cleared 24 in PM for discharge.     Anaphylaxis:  -Continue oral prednisone for 3-day total course, received 1 dose  prior to discharge, end date   - Continue as needed oral Benadryl 25 mg every 6 hours for swelling  - Counseled patient to return to ED if swelling worsens, patient develops throat tightening, shortness of breath, chest pain.  - EpiPen prescribed on discharge  - Allergist referral on discharge, patient states she does not have insurance, social work referral outpatient placed as well.    First Trimester :  -Patient has follow-up appointment scheduled with [facility] for this week, encouraged her to keep this appointment.  - Counseled patient to return to ED for increased vaginal bleeding, foul-smelling vaginal discharge, fevers, chills, pelvic pain.    Lupus:  -Continue home hydroxychloroquine    Consultants:      N/A    Procedures:        None    Labs:  Results for orders placed or performed during the hospital encounter of 24   CBC WITH DIFFERENTIAL   Result Value Ref Range    WBC 16.6 (H) 4.8 - 10.8 K/uL    RBC 4.99 4.20 - 5.40 M/uL    Hemoglobin 14.0 12.0 - 16.0 g/dL    Hematocrit  41.2 37.0 - 47.0 %    MCV 82.6 81.4 - 97.8 fL    MCH 28.1 27.0 - 33.0 pg    MCHC 34.0 32.2 - 35.5 g/dL    RDW 40.2 35.9 - 50.0 fL    Platelet Count 272 164 - 446 K/uL    MPV 10.1 9.0 - 12.9 fL    Neutrophils-Polys 95.60 (H) 44.00 - 72.00 %    Lymphocytes 2.00 (L) 22.00 - 41.00 %    Monocytes 1.70 0.00 - 13.40 %    Eosinophils 0.20 0.00 - 6.90 %    Basophils 0.10 0.00 - 1.80 %    Immature Granulocytes 0.40 0.00 - 0.90 %    Nucleated RBC 0.00 0.00 - 0.20 /100 WBC    Neutrophils (Absolute) 15.91 (H) 1.82 - 7.42 K/uL    Lymphs (Absolute) 0.33 (L) 1.00 - 4.80 K/uL    Monos (Absolute) 0.28 0.00 - 0.85 K/uL    Eos (Absolute) 0.04 0.00 - 0.51 K/uL    Baso (Absolute) 0.01 0.00 - 0.12 K/uL    Immature Granulocytes (abs) 0.07 0.00 - 0.11 K/uL    NRBC (Absolute) 0.00 K/uL   BASIC METABOLIC PANEL   Result Value Ref Range    Sodium 135 135 - 145 mmol/L    Potassium 3.2 (L) 3.6 - 5.5 mmol/L    Chloride 103 96 - 112 mmol/L    Co2 15 (L) 20 - 33 mmol/L    Glucose 91 65 - 99 mg/dL    Bun 15 8 - 22 mg/dL    Creatinine 0.88 0.50 - 1.40 mg/dL    Calcium 8.9 8.5 - 10.5 mg/dL    Anion Gap 17.0 (H) 7.0 - 16.0   ESTIMATED GFR   Result Value Ref Range    GFR (CKD-EPI) 94 >60 mL/min/1.73 m 2   CBC with Differential   Result Value Ref Range    WBC 17.1 (H) 4.8 - 10.8 K/uL    RBC 4.16 (L) 4.20 - 5.40 M/uL    Hemoglobin 11.7 (L) 12.0 - 16.0 g/dL    Hematocrit 34.3 (L) 37.0 - 47.0 %    MCV 82.5 81.4 - 97.8 fL    MCH 28.1 27.0 - 33.0 pg    MCHC 34.1 32.2 - 35.5 g/dL    RDW 40.5 35.9 - 50.0 fL    Platelet Count 228 164 - 446 K/uL    MPV 10.3 9.0 - 12.9 fL    Neutrophils-Polys 96.10 (H) 44.00 - 72.00 %    Lymphocytes 1.80 (L) 22.00 - 41.00 %    Monocytes 1.50 0.00 - 13.40 %    Eosinophils 0.00 0.00 - 6.90 %    Basophils 0.10 0.00 - 1.80 %    Immature Granulocytes 0.50 0.00 - 0.90 %    Nucleated RBC 0.00 0.00 - 0.20 /100 WBC    Neutrophils (Absolute) 16.48 (H) 1.82 - 7.42 K/uL    Lymphs (Absolute) 0.31 (L) 1.00 - 4.80 K/uL    Monos (Absolute) 0.25  0.00 - 0.85 K/uL    Eos (Absolute) 0.00 0.00 - 0.51 K/uL    Baso (Absolute) 0.01 0.00 - 0.12 K/uL    Immature Granulocytes (abs) 0.09 0.00 - 0.11 K/uL    NRBC (Absolute) 0.00 K/uL   Comp Metabolic Panel (CMP)   Result Value Ref Range    Sodium 138 135 - 145 mmol/L    Potassium 3.9 3.6 - 5.5 mmol/L    Chloride 110 96 - 112 mmol/L    Co2 15 (L) 20 - 33 mmol/L    Anion Gap 13.0 7.0 - 16.0    Glucose 106 (H) 65 - 99 mg/dL    Bun 10 8 - 22 mg/dL    Creatinine 0.60 0.50 - 1.40 mg/dL    Calcium 7.8 (L) 8.5 - 10.5 mg/dL    Correct Calcium 8.2 (L) 8.5 - 10.5 mg/dL    AST(SGOT) 21 12 - 45 U/L    ALT(SGPT) 16 2 - 50 U/L    Alkaline Phosphatase 71 30 - 99 U/L    Total Bilirubin 0.3 0.1 - 1.5 mg/dL    Albumin 3.5 3.2 - 4.9 g/dL    Total Protein 6.7 6.0 - 8.2 g/dL    Globulin 3.2 1.9 - 3.5 g/dL    A-G Ratio 1.1 g/dL   ESTIMATED GFR   Result Value Ref Range    GFR (CKD-EPI) 129 >60 mL/min/1.73 m 2   CBC WITHOUT DIFFERENTIAL   Result Value Ref Range    WBC 19.0 (H) 4.8 - 10.8 K/uL    RBC 3.68 (L) 4.20 - 5.40 M/uL    Hemoglobin 10.1 (L) 12.0 - 16.0 g/dL    Hematocrit 31.2 (L) 37.0 - 47.0 %    MCV 84.8 81.4 - 97.8 fL    MCH 27.4 27.0 - 33.0 pg    MCHC 32.4 32.2 - 35.5 g/dL    RDW 43.3 35.9 - 50.0 fL    Platelet Count 231 164 - 446 K/uL    MPV 10.6 9.0 - 12.9 fL   EKG   Result Value Ref Range    Report       [facility] Emergency Dept.    Test Date:  2024  Pt Name:    BERNARDINO SCOTT       Department: ER  MRN:        [id number]                      Room:        11  Gender:     Female                       Technician: [id number]  :        2000                   Requested By:KATE MENDEZ  Order #:    [id number]                    Reading MD: KATE MENDEZ MD    Measurements  Intervals                                Axis  Rate:       131                          P:          60  CT:         135                          QRS:        43  QRSD:       78                           T:          -39  QT:          296  QTc:        437    Interpretive Statements  Sinus tachycardia  Borderline repolarization abnormality  Compared to ECG 03/02/2024 03:51:58  T-wave abnormality no longer present  ST (T wave) deviation no longer present  Electronically Signed On 06- 21:02:55 PDT by KATE MENDEZ MD         Imaging/ Testing:      No orders to display       Physical Exam:  Constitutional: Alert, no distress, well-groomed.  Skin: Warm, dry, good turgor, no rashes in visible areas.  Eye: Equal, round and reactive, conjunctiva clear, lids normal.  ENMT: Lips without lesions, good dentition, moist mucous membranes.  Neck: Trachea midline, no masses, no thyromegaly.  Respiratory: Unlabored respiratory effort, no cough.  MSK: Moves all extremities.  Neuro: Grossly normal  Psych: Normal affect and mood.    Discharge Medications:           Medication List        START taking these medications        Instructions   Banophen 25 MG Tabs  Generic drug: diphenhydrAMINE   Take 1 Tablet by mouth every 6 hours as needed (Lip and mouth swelling).  Dose: 25 mg     EPINEPHrine 0.3 MG/0.3ML Soaj solution for injection  Commonly known as: Epipen   Inject 0.3 mL into the shoulder, thigh, or buttocks one time for 1 dose.  (Inject 0.3 mL into the shoulder, thigh, or buttocks one time for 1 dose.)  Dose: 0.3 mg     predniSONE 20 MG Tabs  Commonly known as: Deltasone   Take 1 tablet once daily for the next two days. Last day 6/24/24.            CONTINUE taking these medications        Instructions   acetaminophen 325 MG Tabs  Commonly known as: Tylenol   Take 325 mg by mouth every four hours as needed for Mild Pain.  Dose: 325 mg     hydroxychloroquine 200 MG Tabs  Commonly known as: Plaquenil   400mg (2 tabs) on Monday wed and Friday and 200mg every other day of the week                Disposition:   Home    Diet:   Regular    Activity:   As tolerated    Instructions:         The patient was instructed to return to the ER in the event of  worsening symptoms. I have counseled the patient on the importance of compliance and the patient has agreed to proceed with all medical recommendations and follow up plan indicated above.   The patient understands that all medications come with benefits and risks. Risks may include permanent injury or death and these risks can be minimized with close reassessment and monitoring.        Please CC: Kevyn Leal M.D.    Follow up appointment details :        Future Appointments   Date Time Provider Department Center   7/8/2024  3:00 PM Naomie Deshpande M.D. [facility] None       Follow up with Primary Care Physician within 7 days of discharge for further evaluation and care     Follow up with [facility] within 1 week of discharge.     Referral for allergist and social work placed.    Pending Studies:        None    The patient met the 2-midnight criteria for an inpatient stay at the time of discharge.      Barbara Jorgensen DO  PGY-2  [facility]

## 2024-06-22 NOTE — PROGRESS NOTES
Notified MD pt requesting PRN for pain and pt has new onset of petechiae/ like rash  to BLE. MD ordered Tylenol IVPB, and PRN benadryl respectively. Airway patent and intact throughout shift; no increased WOB or wheezing/ stridor on auscultation.

## 2024-06-22 NOTE — DISCHARGE INSTRUCTIONS
"Anaphylactic Reaction, Adult  An anaphylactic reaction (anaphylaxis) is a sudden, very bad allergic reaction. This affects more than one part of your body. It can be life-threatening. If you have a very bad reaction, you need to get medical help right away.  What are the causes?  This condition is caused by exposure to things that give you an allergic reaction (allergens). Common allergens include:  Foods, such as peanuts, wheat, shellfish, milk, and eggs.  Medicines.  Insect bites or stings.  Blood or parts of blood that are given for treatment (transfusions).  Chemicals, such as latex and dyes that are used in food and in medical tests.  What are the signs or symptoms?  Signs of a very bad allergic reaction may include:  Feeling warm in the face (flushed). Your face may turn red.  Itchy, red, or swollen areas of skin (hives).  Swelling of:  The eyes or face.  The lips, tongue, or mouth.  The throat.  Trouble with any of these:  Breathing.  Talking.  Swallowing.  Feeling dizzy, light-headed, or like you might faint.  Pain or cramps in your belly.  Vomiting.  Watery poop (diarrhea).  How is this treated?  If you think you are having a very bad allergic reaction, you should do this right away:  Give yourself a shot of medicine (epinephrine) using an auto-injector \"pen.\" Your doctor will teach you how to use this pen.  Call for emergency help. If you use a pen, you must still get treated in the hospital. There, you may be given:  Medicines.  Oxygen.  Fluids in an IV tube.  Follow these instructions at home:  Safety  Always keep an auto-injector pen with you. This could save your life. If you can, carry two auto-injector pens. Use the pen as told by your doctor.  Do not drive after a reaction. Wait until your doctor says it is safe to drive.  Make sure that you, the people who live with you, and your employer know:  What you are allergic to, so you can stay away from it.  How to use your auto-injector pen.  Wear a " bracelet or necklace that says you have an allergy, if your doctor tells you to do this.  Learn the signs of a very bad allergic reaction. This way, you can treat it right away.  Work with your doctors to make a plan for what to do if you have a very bad reaction. It is important to be ready.  If you use your auto-injector pen:  Get more medicine (epinephrine) for your pen right away. This is important in case you have another reaction.  Get help right away.  General instructions  Tell all doctors who care for you that you have an allergy.  If you have itchy, red, swollen areas of skin or a rash:  Use an over-the-counter medicine (antihistamine) as told by your doctor.  Put cold, wet cloths on your skin.  Take a cool bath or shower. Avoid hot water.  Take over-the-counter and prescription medicines only as told by your doctor.  Keep all follow-up visits as told by your doctor.  How is this prevented?  Avoid things that gave you a very bad allergic reaction before.  Tell your  about your allergy when you go out to eat. If you are not sure if your meal has food that you are allergic to, ask your  before you eat it.  Get help right away if:  You have signs of an allergic reaction. You may notice them soon after being exposed to things that give you an allergic reaction.  You had to use your auto-injector pen. You must go to the emergency room even if the medicine seems to be working. This is because another allergic reaction may happen within 3 days (rebound anaphylaxis).  These symptoms may be an emergency. Do not wait to see if the symptoms will go away. Do this right away:  Use your auto-injector pen as you have been told.  Get help. Call your local emergency services (911 in the [place] Do not drive yourself to the hospital.  Summary  An anaphylactic reaction (anaphylaxis) is a sudden, serious allergic reaction.  This condition can be life-threatening. If you have a reaction, get medical help right  "away.  Your doctor will show you how to give yourself a shot (epinephrine injection) with an auto-injector \"pen.\"  Always keep an auto-injector pen with you. It could save your life. Use it as told by your doctor.  If you had to use your auto-injector pen, you must still get treated in the hospital.  This information is not intended to replace advice given to you by your health care provider. Make sure you discuss any questions you have with your health care provider.  Document Revised: 02/03/2022 Document Reviewed: 02/03/2022  Elsevier Patient Education © 2023 Elsevier Inc.    "

## 2024-06-24 ENCOUNTER — PATIENT OUTREACH (OUTPATIENT)
Dept: MEDICAL GROUP | Facility: CLINIC | Age: 24
End: 2024-06-24
Payer: COMMERCIAL

## 2024-06-25 ENCOUNTER — PATIENT OUTREACH (OUTPATIENT)
Dept: MEDICAL GROUP | Facility: CLINIC | Age: 24
End: 2024-06-25
Payer: COMMERCIAL

## 2024-06-25 ASSESSMENT — PATIENT HEALTH QUESTIONNAIRE - PHQ9: CLINICAL INTERPRETATION OF PHQ2 SCORE: 0

## 2024-06-25 NOTE — PROGRESS NOTES
[employer] [facility] MSW intern Psychosocial Assessment     @[phone]  MSW intern contacted Estelle on the mobile number available on file.     Estelle Pappas is a 23 y.o. female here for an initial social work phone encounter with MSW intern Mari Frey. Pt referred to MSW Intern for assistance with accessing medical insurance via warm handoff by Dr. Jorgensen. This writer explained their role as a consultant to the PCP to help improve patient's quality of life and overall functioning through brief, resource focused interventions within CLINIC setting. Informed consent was obtained for social work services.     Relationship/Marital status:   Social Connections: Unknown (12/22/2022)    Social Connection and Isolation Panel [NHANES]     Frequency of Communication with Friends and Family: More than three times a week     Frequency of Social Gatherings with Friends and Family: Twice a week     Attends Taoist Services: Never     Active Member of Clubs or Organizations: No     Attends Club or Organization Meetings: Never     Marital Status: Patient declined       Living situation (e.g., alone, with family, in a group home): Estelle lives in a two story house with her parents.     Support system (family, friends, significant others): Estelle's main support system is her parents.     Employment/educational status: Estelle reported to be employed with [employer] and works fulltime.   Financial situation and resources: Estelle estimated to receive $2,000 a month.   Spiritual life: Methodist   Fun/Hobbies: Estelle reported to like art, going on walks, and plants.   Caffeine: No  Tobacco: No  Alcohol: No  Social History     Substance and Sexual Activity   Alcohol Use No     Marijuana: No  Illicit Drugs: No  Diet/appetite: No specific diet plans.   Exercise: Estelle exercises five times a week.   Sleep: No concerns with sleeping.     Additional Information:  PHQ9: Screening completed and not at risk.   VIOLET-7: Screening completed. (0- minimal  anxiety)    Mental Status observation:   Mood: euthymic and within normal limits  Affect: normal  Speech:   Thought Content:   Appearance: Unable to assess due to encounter being via telephone.   Posture: Unable to assess due to encounter being via telephone.   Psychomotor Activity: Unable to assess due to encounter being via telephone.   Eye Contact: Unable to assess due to encounter being via telephone.     Risk   Any immediate safety concerns or risk factors (e.g., suicidal ideation, self-harm): None   Medical provider informed (urgent need to contact): no  Safety plan (if applicable): N/A     Intervention  We addressed the following components during this outreach/visit. : Estelle main concern is accessing medical insurance and a hospital bill that she will receive soon.     Medical Insurance  Estelle reported that her employer does not offer health insurance. Estelle has explored applying for Medicaid. However, exceeds income guidelines and does not qualify. MSW intern informed Estelle about [place] [employer] [facility] and exploring affordable plans that she could pay for out of pocket. Estelle was receptive to this information and requested MSW intern mailed out information.     Financial Assistance- Hospital Bill   Due to not having insurance and a recent hospital visit on 6/21/24, Estelle is concerned about the amount that she is going to have to pay for her medical bill. MSW intern informed Estelle that [facility] has financial assistance programs that she could look into and also potentially set up a monthly payment arrangement. Estelle was receptive to MSW intern mailing out information for [employer]'s financial assistance programs.     Practice Assignments Given  As follow up, MSW intern asked the client to complete the following task for follow up reviewing the resources mailed out by MSW intern, with a goal of improved quality of life and overall health. Patient agreed and discussed the importance of completing agreed task.       Collaboration  The degree of collaboration between the patient and MSW intern in the current session was: LOW     Follow up & Plan: MSW intern to mail out information for [employer] Financial Assistance options/programs to the confirmed address on file. MSW intern and Estelle agreed to a follow up phone call to ensure that the resources mentioned above were received.

## 2024-06-25 NOTE — PROGRESS NOTES
6/25/24    MSW intern mailed out information for [employer] [facility]'s Financial Assistance options/programs to the confirmed address on file. MSW intern and Estelle agreed to a follow up phone call to ensure that the resources mentioned above were received.

## 2024-07-02 ENCOUNTER — PATIENT OUTREACH (OUTPATIENT)
Dept: MEDICAL GROUP | Facility: CLINIC | Age: 24
End: 2024-07-02
Payer: COMMERCIAL

## 2024-11-25 ENCOUNTER — OFFICE VISIT (OUTPATIENT)
Dept: URGENT CARE | Facility: CLINIC | Age: 24
End: 2024-11-25
Payer: COMMERCIAL

## 2024-11-25 VITALS
BODY MASS INDEX: 23.26 KG/M2 | HEIGHT: 61 IN | OXYGEN SATURATION: 100 % | RESPIRATION RATE: 12 BRPM | DIASTOLIC BLOOD PRESSURE: 64 MMHG | TEMPERATURE: 97.3 F | HEART RATE: 90 BPM | WEIGHT: 123.2 LBS | SYSTOLIC BLOOD PRESSURE: 110 MMHG

## 2024-11-25 DIAGNOSIS — R21 RASH OF BACK: ICD-10-CM

## 2024-11-25 PROCEDURE — 3074F SYST BP LT 130 MM HG: CPT | Performed by: PHYSICIAN ASSISTANT

## 2024-11-25 PROCEDURE — 99214 OFFICE O/P EST MOD 30 MIN: CPT | Performed by: PHYSICIAN ASSISTANT

## 2024-11-25 PROCEDURE — 3078F DIAST BP <80 MM HG: CPT | Performed by: PHYSICIAN ASSISTANT

## 2024-11-25 RX ORDER — TRIAMCINOLONE ACETONIDE 1 MG/G
1 CREAM TOPICAL 2 TIMES DAILY
Qty: 30 G | Refills: 0 | Status: SHIPPED | OUTPATIENT
Start: 2024-11-25

## 2024-11-25 RX ORDER — CEPHALEXIN 500 MG/1
500 CAPSULE ORAL 4 TIMES DAILY
Qty: 28 CAPSULE | Refills: 0 | Status: SHIPPED | OUTPATIENT
Start: 2024-11-25

## 2024-11-25 ASSESSMENT — ENCOUNTER SYMPTOMS
COUGH: 0
DIARRHEA: 0
VOMITING: 0
SORE THROAT: 0
NEUROLOGICAL NEGATIVE: 1
GASTROINTESTINAL NEGATIVE: 1
CONSTITUTIONAL NEGATIVE: 1
RESPIRATORY NEGATIVE: 1
RHINORRHEA: 0
FATIGUE: 0
SHORTNESS OF BREATH: 0
FEVER: 0
CARDIOVASCULAR NEGATIVE: 1

## 2024-11-25 ASSESSMENT — FIBROSIS 4 INDEX: FIB4 SCORE: .5454545454545454545

## 2024-11-26 NOTE — PROGRESS NOTES
Subjective     Estelle Pappas is a very pleasant 24 y.o. female who presents with Bug Bite (Pt is here today for bug bite on back and pt is concerned it is  infected x 5 days. Pt states bite felt like a little bump, continuously scratched it but now feels like there are scabs all over it. )            Rash  This is a new problem. The current episode started in the past 7 days (5 to 6 days). The affected locations include the back. The rash is characterized by swelling, redness, pain and itchiness. She was exposed to an insect bite/sting. Pertinent negatives include no congestion, cough, diarrhea, facial edema, fatigue, fever, joint pain, rhinorrhea, shortness of breath, sore throat or vomiting. Past treatments include antihistamine. The treatment provided mild relief. There is no history of varicella.       PMH:  has a past medical history of Alopecia, Anemia, Mixed connective tissue disease (HCC), Raynaud phenomenon, SLE (systemic lupus erythematosus) ([facility]), TTP (thrombotic thrombocytopenic purpura) ([facility]), TTP (thrombotic thrombocytopenic purpura) ([facility]), and Vitiligo.  MEDS:   Current Outpatient Medications:     hydroxychloroquine (PLAQUENIL) 200 MG Tab, 400mg (2 tabs) on Monday wed and Friday and 200mg every other day of the week, Disp: 135 Tablet, Rfl: 1    acetaminophen (TYLENOL) 325 MG Tab, Take 325 mg by mouth every four hours as needed for Mild Pain., Disp: , Rfl:   ALLERGIES:   Allergies   Allergen Reactions    Amoxicillin Hives, Itching and Swelling     Patient stated    Misoprostol Anaphylaxis     Took promethazine and misoprostol together and developed severe anaphylactic reaction. Unclear which caused the reaction.     Promethazine Anaphylaxis     Patient took misoprostol and promethazine together and developed severe anaphylactic reaction. Unclear which caused the reaction.    Iodine Contrast [Diagnostic X-Ray Materials] Rash and Swelling     Developed after contrast CT     SURGHX:   Past  "Surgical History:   Procedure Laterality Date    CATH PLACEMENT      5 days right jugular for plasma pharesis     SOCHX:  reports that she has never smoked. She has never used smokeless tobacco. She reports that she does not drink alcohol and does not use drugs.  FH: family history includes Hyperlipidemia in her father.      Review of Systems   Constitutional: Negative.  Negative for fatigue and fever.   HENT:  Negative for congestion, rhinorrhea and sore throat.    Respiratory: Negative.  Negative for cough and shortness of breath.    Cardiovascular: Negative.    Gastrointestinal: Negative.  Negative for diarrhea and vomiting.   Musculoskeletal:  Negative for joint pain.   Skin:  Positive for itching and rash.   Neurological: Negative.        Medications, Allergies, and current problem list reviewed today in Epic           Objective     /64   Pulse 90   Temp 36.3 °C (97.3 °F) (Temporal)   Resp 12   Ht 1.549 m (5' 1\")   Wt 55.9 kg (123 lb 3.2 oz)   LMP 11/13/2024 (Exact Date)   SpO2 100%   BMI 23.28 kg/m²      Physical Exam  Vitals and nursing note reviewed.   Constitutional:       General: She is not in acute distress.     Appearance: Normal appearance. She is well-developed. She is not ill-appearing, toxic-appearing or diaphoretic.   HENT:      Head: Normocephalic and atraumatic.      Right Ear: Hearing and external ear normal.      Left Ear: Hearing and external ear normal.      Nose: Nose normal.   Eyes:      General:         Right eye: No discharge.         Left eye: No discharge.      Conjunctiva/sclera: Conjunctivae normal.   Cardiovascular:      Rate and Rhythm: Normal rate and regular rhythm.      Heart sounds: Normal heart sounds.   Pulmonary:      Effort: Pulmonary effort is normal. No respiratory distress.      Breath sounds: Normal breath sounds. No wheezing.   Musculoskeletal:      Cervical back: Normal range of motion and neck supple.   Skin:     General: Skin is warm and dry.            "  Comments: Cluster of erythematous lesions on the right lower back.  They are excoriated and scabbed over.  No spreading erythema or bony/midline tenderness.   Neurological:      General: No focal deficit present.      Mental Status: She is alert and oriented to person, place, and time.   Psychiatric:         Mood and Affect: Mood normal.         Behavior: Behavior normal.         Thought Content: Thought content normal.         Judgment: Judgment normal.                             Assessment & Plan        Assessment & Plan  Rash of back  This is a very pleasant 24-year-old female presenting with a painful itchy rash on the right lower back for the past 5 days.  No systemic symptoms.  Differential includes infected bug bite versus zoster.  Patient has had zoster before and states this feels different.  She is immunocompromised so therefore could be recurrence of varicella with atypical presentation.  However, she is 5 to 6 days and therefore she is outside the treatment window as is.  Based on pain, itching and scabbing overlying we will treat for infected bug bite with Keflex.  Triamcinolone cream and Benadryl for pruritus.      Orders:    triamcinolone acetonide (KENALOG) 0.1 % Cream; Apply 1 Application topically 2 times a day.    cephALEXin (KEFLEX) 500 MG Cap; Take 1 Capsule by mouth 4 times a day.            I personally reviewed prior external notes and test results pertinent to today's visit. Return to clinic or go to ED if symptoms worsen or persist. Red flag symptoms and indications for ED discussed at length. Patient/Parent/Guardian voices understanding.  AVS with post-visit instructions printed and provided or given verbally.  Follow-up with your primary care provider in 3-5 days. All side effects and potential interactions of prescribed medication discussed including allergic response, GI upset, tendon injury, rash, sedation, OCP effectiveness, etc.    Please note that this dictation was created using  voice recognition software. I have made every reasonable attempt to correct obvious errors, but I expect that there are errors of grammar and possibly content that I did not discover before finalizing the note.

## 2024-12-15 ENCOUNTER — APPOINTMENT (OUTPATIENT)
Dept: RADIOLOGY | Facility: MEDICAL CENTER | Age: 24
DRG: 392 | End: 2024-12-15
Attending: EMERGENCY MEDICINE
Payer: COMMERCIAL

## 2024-12-15 ENCOUNTER — HOSPITAL ENCOUNTER (INPATIENT)
Facility: MEDICAL CENTER | Age: 24
LOS: 1 days | DRG: 392 | End: 2024-12-16
Attending: EMERGENCY MEDICINE | Admitting: STUDENT IN AN ORGANIZED HEALTH CARE EDUCATION/TRAINING PROGRAM
Payer: COMMERCIAL

## 2024-12-15 DIAGNOSIS — A09 DIARRHEA OF INFECTIOUS ORIGIN: ICD-10-CM

## 2024-12-15 DIAGNOSIS — R11.2 NAUSEA AND VOMITING, UNSPECIFIED VOMITING TYPE: ICD-10-CM

## 2024-12-15 PROBLEM — E87.20 LACTIC ACIDOSIS: Status: ACTIVE | Noted: 2024-12-15

## 2024-12-15 LAB
ALBUMIN SERPL BCP-MCNC: 4.8 G/DL (ref 3.2–4.9)
ALBUMIN/GLOB SERPL: 1.1 G/DL
ALP SERPL-CCNC: 104 U/L (ref 30–99)
ALT SERPL-CCNC: 39 U/L (ref 2–50)
ANION GAP SERPL CALC-SCNC: 18 MMOL/L (ref 7–16)
APPEARANCE UR: CLEAR
AST SERPL-CCNC: 23 U/L (ref 12–45)
B PARAP IS1001 DNA NPH QL NAA+NON-PROBE: NOT DETECTED
B PERT.PT PRMT NPH QL NAA+NON-PROBE: NOT DETECTED
BACTERIA #/AREA URNS HPF: ABNORMAL /HPF
BASOPHILS # BLD AUTO: 0.2 % (ref 0–1.8)
BASOPHILS # BLD: 0.04 K/UL (ref 0–0.12)
BILIRUB SERPL-MCNC: 0.3 MG/DL (ref 0.1–1.5)
BILIRUB UR QL STRIP.AUTO: NEGATIVE
BUN SERPL-MCNC: 21 MG/DL (ref 8–22)
C PNEUM DNA NPH QL NAA+NON-PROBE: NOT DETECTED
CALCIUM ALBUM COR SERPL-MCNC: 8.9 MG/DL (ref 8.5–10.5)
CALCIUM SERPL-MCNC: 9.5 MG/DL (ref 8.5–10.5)
CASTS URNS QL MICRO: ABNORMAL /LPF (ref 0–2)
CHLORIDE SERPL-SCNC: 102 MMOL/L (ref 96–112)
CO2 SERPL-SCNC: 18 MMOL/L (ref 20–33)
COLOR UR: YELLOW
CREAT SERPL-MCNC: 0.77 MG/DL (ref 0.5–1.4)
EKG IMPRESSION: NORMAL
EOSINOPHIL # BLD AUTO: 0.09 K/UL (ref 0–0.51)
EOSINOPHIL NFR BLD: 0.4 % (ref 0–6.9)
EPITHELIAL CELLS 1715: ABNORMAL /HPF (ref 0–5)
ERYTHROCYTE [DISTWIDTH] IN BLOOD BY AUTOMATED COUNT: 41.3 FL (ref 35.9–50)
FLUAV RNA NPH QL NAA+NON-PROBE: NOT DETECTED
FLUBV RNA NPH QL NAA+NON-PROBE: NOT DETECTED
GFR SERPLBLD CREATININE-BSD FMLA CKD-EPI: 110 ML/MIN/1.73 M 2
GLOBULIN SER CALC-MCNC: 4.2 G/DL (ref 1.9–3.5)
GLUCOSE SERPL-MCNC: 132 MG/DL (ref 65–99)
GLUCOSE UR STRIP.AUTO-MCNC: NEGATIVE MG/DL
HADV DNA NPH QL NAA+NON-PROBE: NOT DETECTED
HCG SERPL QL: NEGATIVE
HCOV 229E RNA NPH QL NAA+NON-PROBE: NOT DETECTED
HCOV HKU1 RNA NPH QL NAA+NON-PROBE: NOT DETECTED
HCOV NL63 RNA NPH QL NAA+NON-PROBE: NOT DETECTED
HCOV OC43 RNA NPH QL NAA+NON-PROBE: NOT DETECTED
HCT VFR BLD AUTO: 45.4 % (ref 37–47)
HGB BLD-MCNC: 15.1 G/DL (ref 12–16)
HMPV RNA NPH QL NAA+NON-PROBE: NOT DETECTED
HPIV1 RNA NPH QL NAA+NON-PROBE: NOT DETECTED
HPIV2 RNA NPH QL NAA+NON-PROBE: NOT DETECTED
HPIV3 RNA NPH QL NAA+NON-PROBE: NOT DETECTED
HPIV4 RNA NPH QL NAA+NON-PROBE: NOT DETECTED
IMM GRANULOCYTES # BLD AUTO: 0.17 K/UL (ref 0–0.11)
IMM GRANULOCYTES NFR BLD AUTO: 0.7 % (ref 0–0.9)
KETONES UR STRIP.AUTO-MCNC: NEGATIVE MG/DL
LACTATE SERPL-SCNC: 2 MMOL/L (ref 0.5–2)
LACTATE SERPL-SCNC: 3 MMOL/L (ref 0.5–2)
LACTATE SERPL-SCNC: 4 MMOL/L (ref 0.5–2)
LACTATE SERPL-SCNC: 6.6 MMOL/L (ref 0.5–2)
LEUKOCYTE ESTERASE UR QL STRIP.AUTO: ABNORMAL
LYMPHOCYTES # BLD AUTO: 1.62 K/UL (ref 1–4.8)
LYMPHOCYTES NFR BLD: 6.6 % (ref 22–41)
M PNEUMO DNA NPH QL NAA+NON-PROBE: NOT DETECTED
MCH RBC QN AUTO: 27 PG (ref 27–33)
MCHC RBC AUTO-ENTMCNC: 33.3 G/DL (ref 32.2–35.5)
MCV RBC AUTO: 81.2 FL (ref 81.4–97.8)
MICRO URNS: ABNORMAL
MONOCYTES # BLD AUTO: 1.09 K/UL (ref 0–0.85)
MONOCYTES NFR BLD AUTO: 4.5 % (ref 0–13.4)
NEUTROPHILS # BLD AUTO: 21.4 K/UL (ref 1.82–7.42)
NEUTROPHILS NFR BLD: 87.6 % (ref 44–72)
NITRITE UR QL STRIP.AUTO: NEGATIVE
NRBC # BLD AUTO: 0 K/UL
NRBC BLD-RTO: 0 /100 WBC (ref 0–0.2)
PH UR STRIP.AUTO: 5.5 [PH] (ref 5–8)
PLATELET # BLD AUTO: 557 K/UL (ref 164–446)
PMV BLD AUTO: 9.4 FL (ref 9–12.9)
POTASSIUM SERPL-SCNC: 3.3 MMOL/L (ref 3.6–5.5)
PROT SERPL-MCNC: 9 G/DL (ref 6–8.2)
PROT UR QL STRIP: 30 MG/DL
RBC # BLD AUTO: 5.59 M/UL (ref 4.2–5.4)
RBC # URNS HPF: ABNORMAL /HPF (ref 0–2)
RBC UR QL AUTO: ABNORMAL
RSV RNA NPH QL NAA+NON-PROBE: NOT DETECTED
RV+EV RNA NPH QL NAA+NON-PROBE: NOT DETECTED
SARS-COV-2 RNA NPH QL NAA+NON-PROBE: NOTDETECTED
SODIUM SERPL-SCNC: 138 MMOL/L (ref 135–145)
SP GR UR STRIP.AUTO: 1.02
UROBILINOGEN UR STRIP.AUTO-MCNC: 0.2 EU/DL
WBC # BLD AUTO: 24.4 K/UL (ref 4.8–10.8)
WBC #/AREA URNS HPF: ABNORMAL /HPF

## 2024-12-15 PROCEDURE — 84703 CHORIONIC GONADOTROPIN ASSAY: CPT

## 2024-12-15 PROCEDURE — 700111 HCHG RX REV CODE 636 W/ 250 OVERRIDE (IP): Mod: JZ | Performed by: EMERGENCY MEDICINE

## 2024-12-15 PROCEDURE — 93010 ELECTROCARDIOGRAM REPORT: CPT | Performed by: STUDENT IN AN ORGANIZED HEALTH CARE EDUCATION/TRAINING PROGRAM

## 2024-12-15 PROCEDURE — 87507 IADNA-DNA/RNA PROBE TQ 12-25: CPT

## 2024-12-15 PROCEDURE — 85025 COMPLETE CBC W/AUTO DIFF WBC: CPT

## 2024-12-15 PROCEDURE — 0202U NFCT DS 22 TRGT SARS-COV-2: CPT

## 2024-12-15 PROCEDURE — 99285 EMERGENCY DEPT VISIT HI MDM: CPT

## 2024-12-15 PROCEDURE — 96375 TX/PRO/DX INJ NEW DRUG ADDON: CPT

## 2024-12-15 PROCEDURE — 700102 HCHG RX REV CODE 250 W/ 637 OVERRIDE(OP): Performed by: EMERGENCY MEDICINE

## 2024-12-15 PROCEDURE — 700105 HCHG RX REV CODE 258: Performed by: HOSPITALIST

## 2024-12-15 PROCEDURE — 87077 CULTURE AEROBIC IDENTIFY: CPT | Mod: 91

## 2024-12-15 PROCEDURE — 700102 HCHG RX REV CODE 250 W/ 637 OVERRIDE(OP)

## 2024-12-15 PROCEDURE — 87040 BLOOD CULTURE FOR BACTERIA: CPT

## 2024-12-15 PROCEDURE — 83605 ASSAY OF LACTIC ACID: CPT

## 2024-12-15 PROCEDURE — 87045 FECES CULTURE AEROBIC BACT: CPT

## 2024-12-15 PROCEDURE — 93005 ELECTROCARDIOGRAM TRACING: CPT | Mod: TC

## 2024-12-15 PROCEDURE — 87798 DETECT AGENT NOS DNA AMP: CPT

## 2024-12-15 PROCEDURE — 80053 COMPREHEN METABOLIC PANEL: CPT

## 2024-12-15 PROCEDURE — A9270 NON-COVERED ITEM OR SERVICE: HCPCS

## 2024-12-15 PROCEDURE — 81001 URINALYSIS AUTO W/SCOPE: CPT

## 2024-12-15 PROCEDURE — 71045 X-RAY EXAM CHEST 1 VIEW: CPT

## 2024-12-15 PROCEDURE — 36415 COLL VENOUS BLD VENIPUNCTURE: CPT

## 2024-12-15 PROCEDURE — 74176 CT ABD & PELVIS W/O CONTRAST: CPT

## 2024-12-15 PROCEDURE — 87046 STOOL CULTR AEROBIC BACT EA: CPT

## 2024-12-15 PROCEDURE — 96365 THER/PROPH/DIAG IV INF INIT: CPT

## 2024-12-15 PROCEDURE — 770000 HCHG ROOM/CARE - INTERMEDIATE ICU *

## 2024-12-15 PROCEDURE — 87086 URINE CULTURE/COLONY COUNT: CPT

## 2024-12-15 PROCEDURE — 96372 THER/PROPH/DIAG INJ SC/IM: CPT

## 2024-12-15 PROCEDURE — A9270 NON-COVERED ITEM OR SERVICE: HCPCS | Performed by: EMERGENCY MEDICINE

## 2024-12-15 PROCEDURE — 700111 HCHG RX REV CODE 636 W/ 250 OVERRIDE (IP)

## 2024-12-15 PROCEDURE — 700105 HCHG RX REV CODE 258: Performed by: EMERGENCY MEDICINE

## 2024-12-15 RX ORDER — PREDNISONE 10 MG/1
10 TABLET ORAL DAILY
Status: DISCONTINUED | OUTPATIENT
Start: 2024-12-15 | End: 2024-12-16 | Stop reason: HOSPADM

## 2024-12-15 RX ORDER — PREDNISONE 10 MG/1
5 TABLET ORAL DAILY
Status: DISCONTINUED | OUTPATIENT
Start: 2024-12-18 | End: 2024-12-16 | Stop reason: HOSPADM

## 2024-12-15 RX ORDER — DIPHENHYDRAMINE HYDROCHLORIDE 50 MG/ML
25 INJECTION INTRAMUSCULAR; INTRAVENOUS EVERY 6 HOURS PRN
Status: DISCONTINUED | OUTPATIENT
Start: 2024-12-15 | End: 2024-12-16

## 2024-12-15 RX ORDER — ACETAMINOPHEN 325 MG/1
650 TABLET ORAL EVERY 6 HOURS PRN
Status: DISCONTINUED | OUTPATIENT
Start: 2024-12-15 | End: 2024-12-16 | Stop reason: HOSPADM

## 2024-12-15 RX ORDER — SODIUM CHLORIDE, SODIUM LACTATE, POTASSIUM CHLORIDE, AND CALCIUM CHLORIDE .6; .31; .03; .02 G/100ML; G/100ML; G/100ML; G/100ML
500 INJECTION, SOLUTION INTRAVENOUS
Status: DISCONTINUED | OUTPATIENT
Start: 2024-12-15 | End: 2024-12-16

## 2024-12-15 RX ORDER — SODIUM CHLORIDE, SODIUM LACTATE, POTASSIUM CHLORIDE, CALCIUM CHLORIDE 600; 310; 30; 20 MG/100ML; MG/100ML; MG/100ML; MG/100ML
1000 INJECTION, SOLUTION INTRAVENOUS ONCE
Status: COMPLETED | OUTPATIENT
Start: 2024-12-15 | End: 2024-12-15

## 2024-12-15 RX ORDER — METRONIDAZOLE 500 MG/100ML
500 INJECTION, SOLUTION INTRAVENOUS ONCE
Status: COMPLETED | OUTPATIENT
Start: 2024-12-15 | End: 2024-12-15

## 2024-12-15 RX ORDER — DICYCLOMINE HCL 20 MG
20 TABLET ORAL ONCE
Status: COMPLETED | OUTPATIENT
Start: 2024-12-15 | End: 2024-12-15

## 2024-12-15 RX ORDER — SODIUM CHLORIDE, SODIUM LACTATE, POTASSIUM CHLORIDE, AND CALCIUM CHLORIDE .6; .31; .03; .02 G/100ML; G/100ML; G/100ML; G/100ML
30 INJECTION, SOLUTION INTRAVENOUS ONCE
Status: COMPLETED | OUTPATIENT
Start: 2024-12-15 | End: 2024-12-15

## 2024-12-15 RX ORDER — CEFTRIAXONE 2 G/1
2000 INJECTION, POWDER, FOR SOLUTION INTRAMUSCULAR; INTRAVENOUS ONCE
Status: COMPLETED | OUTPATIENT
Start: 2024-12-15 | End: 2024-12-15

## 2024-12-15 RX ORDER — NICOTINE 14MG/24HR
250 PATCH, TRANSDERMAL 24 HOURS TRANSDERMAL DAILY
COMMUNITY

## 2024-12-15 RX ORDER — SODIUM CHLORIDE 9 MG/ML
INJECTION, SOLUTION INTRAVENOUS CONTINUOUS
Status: DISCONTINUED | OUTPATIENT
Start: 2024-12-15 | End: 2024-12-15

## 2024-12-15 RX ORDER — ONDANSETRON 2 MG/ML
4 INJECTION INTRAMUSCULAR; INTRAVENOUS EVERY 4 HOURS PRN
Status: DISCONTINUED | OUTPATIENT
Start: 2024-12-15 | End: 2024-12-16 | Stop reason: HOSPADM

## 2024-12-15 RX ORDER — PREDNISONE 10 MG/1
5 TABLET ORAL DAILY
Status: DISCONTINUED | OUTPATIENT
Start: 2024-12-16 | End: 2024-12-15

## 2024-12-15 RX ORDER — SODIUM CHLORIDE, SODIUM LACTATE, POTASSIUM CHLORIDE, CALCIUM CHLORIDE 600; 310; 30; 20 MG/100ML; MG/100ML; MG/100ML; MG/100ML
INJECTION, SOLUTION INTRAVENOUS CONTINUOUS
Status: DISCONTINUED | OUTPATIENT
Start: 2024-12-15 | End: 2024-12-16

## 2024-12-15 RX ORDER — ENOXAPARIN SODIUM 100 MG/ML
40 INJECTION SUBCUTANEOUS DAILY
Status: DISCONTINUED | OUTPATIENT
Start: 2024-12-15 | End: 2024-12-16

## 2024-12-15 RX ORDER — ONDANSETRON 4 MG/1
4 TABLET, ORALLY DISINTEGRATING ORAL EVERY 4 HOURS PRN
Status: DISCONTINUED | OUTPATIENT
Start: 2024-12-15 | End: 2024-12-16 | Stop reason: HOSPADM

## 2024-12-15 RX ORDER — ONDANSETRON 2 MG/ML
4 INJECTION INTRAMUSCULAR; INTRAVENOUS ONCE
Status: COMPLETED | OUTPATIENT
Start: 2024-12-15 | End: 2024-12-15

## 2024-12-15 RX ORDER — HYDROXYCHLOROQUINE SULFATE 200 MG/1
400 TABLET, FILM COATED ORAL DAILY
Status: DISCONTINUED | OUTPATIENT
Start: 2024-12-16 | End: 2024-12-16

## 2024-12-15 RX ADMIN — FENTANYL CITRATE 50 MCG: 50 INJECTION, SOLUTION INTRAMUSCULAR; INTRAVENOUS at 13:45

## 2024-12-15 RX ADMIN — ENOXAPARIN SODIUM 40 MG: 100 INJECTION SUBCUTANEOUS at 18:20

## 2024-12-15 RX ADMIN — SODIUM CHLORIDE, POTASSIUM CHLORIDE, SODIUM LACTATE AND CALCIUM CHLORIDE 1000 ML: 600; 310; 30; 20 INJECTION, SOLUTION INTRAVENOUS at 13:45

## 2024-12-15 RX ADMIN — DICYCLOMINE HYDROCHLORIDE 20 MG: 20 TABLET ORAL at 13:34

## 2024-12-15 RX ADMIN — SODIUM CHLORIDE, POTASSIUM CHLORIDE, SODIUM LACTATE AND CALCIUM CHLORIDE: 600; 310; 30; 20 INJECTION, SOLUTION INTRAVENOUS at 18:23

## 2024-12-15 RX ADMIN — METRONIDAZOLE 500 MG: 500 INJECTION, SOLUTION INTRAVENOUS at 14:44

## 2024-12-15 RX ADMIN — ACETAMINOPHEN 650 MG: 325 TABLET ORAL at 20:04

## 2024-12-15 RX ADMIN — PREDNISONE 10 MG: 10 TABLET ORAL at 18:20

## 2024-12-15 RX ADMIN — CEFTRIAXONE SODIUM 2000 MG: 2 INJECTION, POWDER, FOR SOLUTION INTRAMUSCULAR; INTRAVENOUS at 14:35

## 2024-12-15 RX ADMIN — ONDANSETRON 4 MG: 2 INJECTION INTRAMUSCULAR; INTRAVENOUS at 13:33

## 2024-12-15 RX ADMIN — SODIUM CHLORIDE, POTASSIUM CHLORIDE, SODIUM LACTATE AND CALCIUM CHLORIDE 1599 ML: 600; 310; 30; 20 INJECTION, SOLUTION INTRAVENOUS at 15:10

## 2024-12-15 ASSESSMENT — LIFESTYLE VARIABLES
ALCOHOL_USE: NO
EVER HAD A DRINK FIRST THING IN THE MORNING TO STEADY YOUR NERVES TO GET RID OF A HANGOVER: NO
TOTAL SCORE: 0
HAVE PEOPLE ANNOYED YOU BY CRITICIZING YOUR DRINKING: NO
TOTAL SCORE: 0
TOTAL SCORE: 0
DOES PATIENT WANT TO STOP DRINKING: NO
EVER FELT BAD OR GUILTY ABOUT YOUR DRINKING: NO
HOW MANY TIMES IN THE PAST YEAR HAVE YOU HAD 5 OR MORE DRINKS IN A DAY: 0
ON A TYPICAL DAY WHEN YOU DRINK ALCOHOL HOW MANY DRINKS DO YOU HAVE: 0
CONSUMPTION TOTAL: NEGATIVE
HAVE YOU EVER FELT YOU SHOULD CUT DOWN ON YOUR DRINKING: NO
AVERAGE NUMBER OF DAYS PER WEEK YOU HAVE A DRINK CONTAINING ALCOHOL: 0

## 2024-12-15 ASSESSMENT — COGNITIVE AND FUNCTIONAL STATUS - GENERAL
SUGGESTED CMS G CODE MODIFIER DAILY ACTIVITY: CH
DAILY ACTIVITIY SCORE: 24
MOBILITY SCORE: 24
SUGGESTED CMS G CODE MODIFIER MOBILITY: CH

## 2024-12-15 ASSESSMENT — PAIN DESCRIPTION - PAIN TYPE
TYPE: ACUTE PAIN

## 2024-12-15 ASSESSMENT — FIBROSIS 4 INDEX
FIB4 SCORE: 0.68
FIB4 SCORE: 0.16

## 2024-12-15 ASSESSMENT — SOCIAL DETERMINANTS OF HEALTH (SDOH)
WITHIN THE LAST YEAR, HAVE YOU BEEN KICKED, HIT, SLAPPED, OR OTHERWISE PHYSICALLY HURT BY YOUR PARTNER OR EX-PARTNER?: NO
WITHIN THE PAST 12 MONTHS, THE FOOD YOU BOUGHT JUST DIDN'T LAST AND YOU DIDN'T HAVE MONEY TO GET MORE: NEVER TRUE
IN THE PAST 12 MONTHS, HAS THE ELECTRIC, GAS, OIL, OR WATER COMPANY THREATENED TO SHUT OFF SERVICE IN YOUR HOME?: NO
WITHIN THE LAST YEAR, HAVE YOU BEEN HUMILIATED OR EMOTIONALLY ABUSED IN OTHER WAYS BY YOUR PARTNER OR EX-PARTNER?: NO
WITHIN THE LAST YEAR, HAVE YOU BEEN AFRAID OF YOUR PARTNER OR EX-PARTNER?: NO
WITHIN THE LAST YEAR, HAVE TO BEEN RAPED OR FORCED TO HAVE ANY KIND OF SEXUAL ACTIVITY BY YOUR PARTNER OR EX-PARTNER?: NO
WITHIN THE PAST 12 MONTHS, YOU WORRIED THAT YOUR FOOD WOULD RUN OUT BEFORE YOU GOT THE MONEY TO BUY MORE: NEVER TRUE

## 2024-12-15 ASSESSMENT — PATIENT HEALTH QUESTIONNAIRE - PHQ9
1. LITTLE INTEREST OR PLEASURE IN DOING THINGS: NOT AT ALL
SUM OF ALL RESPONSES TO PHQ9 QUESTIONS 1 AND 2: 0
2. FEELING DOWN, DEPRESSED, IRRITABLE, OR HOPELESS: NOT AT ALL

## 2024-12-15 NOTE — ED TRIAGE NOTES
Chief Complaint   Patient presents with    Abdominal Pain    Nausea/Vomiting/Diarrhea     Patient ambulatory to triage with above complaint. Report N/V/D started this morning. Reports chills and body aches in triage. Patient reports she is incontinent of bowel during triage.   Patient looks pale during triage. Bp: 99/70 during triage    Sepsis score of 3. Charge updated.

## 2024-12-15 NOTE — H&P
"    FAMILY MEDICINE HISTORY AND PHYSICAL       PATIENT ID:  NAME:  Estelle Pappas  MRN:               [id number]  YOB: 2000    Date of Admission: 12/15/2024     Attending: Daisy Mcnamara M.d.     Resident: Sugey Arredondo M.D.    Primary Care Physician:  Bob Scherer D.O.    CC:    Chief Complaint   Patient presents with    Abdominal Pain    Nausea/Vomiting/Diarrhea       HPI: Estelle Pappas is a 24 y.o. female with PMHx of lupus, history of ITP who presented with nausea, vomiting, diarrhea.     Patient reports she had acute onset vomiting and diarrhea starting this morning. States she has had at least 8 episodes of vomiting which is now yellowish in color without red or black.  Also has had at least 5 episodes of diarrhea which is not red or black.  She reports some mild lower abdominal pain when she is vomiting but otherwise does not have pain.  Feels feverish with chills. Denies any pain with urination, urgency, frequency, back pain.  Patient reports that she has family members who are sick with runny nose and a cough but nobody with similar symptoms.    She reports she ate some prime rib 12/14 p.m. which she thought was on the \"pink side\" which she believes to be the cause of her symptoms.  She had this with some of her coworkers so she is not certain if other people have similar symptoms.    Of note patient reports she is taking prednisone and Plaquenil for her lupus flare.  She has been taking those as prescribed up until this morning.    She reports she is thirsty and would like to drink something but has not been able to eat or drink today.    ERCourse:    Patient afebrile, heart rate in the 90s, normal respiratory rate, hypotensive initially with pressures improved to low normal range.  Saturating well in room air  Patient with elevated WBC to 24.4K with left shift (currently on steroid taper), with thrombocytosis  CMP with potassium of 3.3, gap acidosis with bicarb " of 18.  LFTs noncontributory normal bilirubin  Lactic acid of 4.0  Beta-hCG negative  Chest x-ray without evidence of acute cardiopulmonary process  CT abdomen pelvis without acute abnormality    Patient received sepsis bolus, Rocephin, flagyl, Bentyl, fentanyl, Zofran in ED  REVIEW OF SYSTEMS:   Ten systems reviewed and were negative except as noted in the HPI.                PAST MEDICAL HISTORY:   has a past medical history of Alopecia, Anemia, Mixed connective tissue disease (HCC), Raynaud phenomenon, SLE (systemic lupus erythematosus) (HCC), TTP (thrombotic thrombocytopenic purpura) (HCC), TTP (thrombotic thrombocytopenic purpura) (HCC), and Vitiligo.     PAST SURGICAL HISTORY:   has a past surgical history that includes cath placement.     FAMILY HISTORY:  family history includes Hyperlipidemia in her father.     SOCIAL HISTORY:   Employment: [employer]  Living Situation: Lives with mom  Smoking: Denies  Etoh: Denies  Recreational Drug: Denies    DIET:   Orders Placed This Encounter   Procedures    Diet Order Diet: Regular     Standing Status:   Standing     Number of Occurrences:   1     Order Specific Question:   Diet:     Answer:   Regular [1]       ALLERGIES:  Allergies   Allergen Reactions    Amoxicillin Hives, Itching and Swelling     Patient stated    Misoprostol Anaphylaxis     Took promethazine and misoprostol together and developed severe anaphylactic reaction. Unclear which caused the reaction.     Promethazine Anaphylaxis     Patient took misoprostol and promethazine together and developed severe anaphylactic reaction. Unclear which caused the reaction.    Iodine Contrast [Diagnostic X-Ray Materials] Rash and Swelling     Developed after contrast CT       OUTPATIENT MEDICATIONS:    Current Facility-Administered Medications:     NS infusion, , Intravenous, Continuous, Sugey Arredondo M.D.    enoxaparin (Lovenox) inj 40 mg, 40 mg, Subcutaneous, DAILY AT 1800, Sugey Arredondo M.D.    acetaminophen  (Tylenol) tablet 650 mg, 650 mg, Oral, Q6HRS PRN, Sugey Arredondo M.D.    ondansetron (Zofran) syringe/vial injection 4 mg, 4 mg, Intravenous, Q4HRS PRN, Sugey Arredondo M.D.    ondansetron (Zofran ODT) dispertab 4 mg, 4 mg, Oral, Q4HRS PRN, Sugey Arredondo M.D.    diphenhydrAMINE (Benadryl) injection 25 mg, 25 mg, Intravenous, Q6HRS PRN, Sugey Arredondo M.D.    [START ON 2024] cefTRIAXone (Rocephin) syringe 2,000 mg, 2,000 mg, Intravenous, Q24HRS, Sugey Arredondo M.D.    hydroxychloroquine (Plaquenil) tablet 400 mg, 400 mg, Oral, DAILY, Sugey Arredondo M.D.    predniSONE (Deltasone) tablet 10 mg, 10 mg, Oral, DAILY, Sugey Arredondo M.D.    [START ON 2024] predniSONE (Deltasone) tablet 5 mg, 5 mg, Oral, DAILY, Sugey Arredondo M.D.    Current Outpatient Medications:     Saccharomyces boulardii (PROBIOTIC) 250 MG Cap, Take 250 mg by mouth every day., Disp: , Rfl:     predniSONE (DELTASONE) 5 MG Tab, 20mg (4 tabs) daily for 5 days then 15mg (3 tabs) daily for 5 days then 10mg (2 tabs) daily for 5 days then 5mg (1 tab) daily for 5 days. Take in AM with food., Disp: 50 Tablet, Rfl: 0    hydroxychloroquine (PLAQUENIL) 200 MG Tab, Take 2 Tablets by mouth every day., Disp: 180 Tablet, Rfl: 1    cephALEXin (KEFLEX) 500 MG Cap, Take 1 Capsule by mouth 4 times a day. (Patient taking differently: Take 500 mg by mouth 4 times a day. Finished on 24), Disp: 28 Capsule, Rfl: 0    PHYSICAL EXAM:  Vitals:    12/15/24 1459 12/15/24 1507 12/15/24 1509 12/15/24 1531   BP:   109/62    Pulse: (!) 105 (!) 106  (!) 108   Resp: 17 17  18   Temp:       TempSrc:       SpO2: 95% 97%  92%   Weight:       Height:       , Temp (24hrs), Av.8 °C (98.3 °F), Min:36.8 °C (98.3 °F), Max:36.8 °C (98.3 °F)  , Pulse Oximetry: 92 %    Physical Exam  Constitutional:       General: She is not in acute distress.     Appearance: She is ill-appearing. She is not toxic-appearing or diaphoretic.   HENT:      Head:  "Normocephalic and atraumatic.      Right Ear: External ear normal.      Left Ear: External ear normal.      Nose: Nose normal. No congestion.      Mouth/Throat:      Pharynx: No oropharyngeal exudate.   Eyes:      General:         Right eye: No discharge.         Left eye: No discharge.      Extraocular Movements: Extraocular movements intact.   Cardiovascular:      Rate and Rhythm: Regular rhythm. Tachycardia present.      Pulses: Normal pulses.      Heart sounds: No murmur heard.  Pulmonary:      Effort: Pulmonary effort is normal. No respiratory distress.      Breath sounds: Normal breath sounds.   Abdominal:      General: Abdomen is flat. There is no distension.      Palpations: Abdomen is soft.      Tenderness: There is no abdominal tenderness. There is no right CVA tenderness, left CVA tenderness, guarding or rebound.   Musculoskeletal:         General: Normal range of motion.      Cervical back: Normal range of motion. No rigidity.      Right lower leg: No edema.      Left lower leg: No edema.   Lymphadenopathy:      Cervical: No cervical adenopathy.   Skin:     Coloration: Skin is pale.   Neurological:      General: No focal deficit present.      Mental Status: She is alert and oriented to person, place, and time.      Cranial Nerves: No cranial nerve deficit.   Psychiatric:         Mood and Affect: Mood normal.         Behavior: Behavior normal.           LAB TESTS:   Recent Labs     12/15/24  1321   WBC 24.4*   RBC 5.59*   HEMOGLOBIN 15.1   HEMATOCRIT 45.4   MCV 81.2*   MCH 27.0   MCHC 33.3   RDW 41.3   PLATELETCT 557*   MPV 9.4     Recent Labs     12/15/24  1321   SODIUM 138   POTASSIUM 3.3*   CHLORIDE 102   CO2 18*   GLUCOSE 132*   BUN 21   CREATININE 0.77   CALCIUM 9.5     Recent Labs     12/15/24  1321   ALTSGPT 39   ASTSGOT 23   ALKPHOSPHAT 104*   TBILIRUBIN 0.3   GLUCOSE 132*         No results for input(s): \"NTPROBNP\" in the last 72 hours.      No results for input(s): \"TROPONINT\" in the last 72 " hours.    CULTURES:   Results       Procedure Component Value Units Date/Time    Blood Culture - Draw one from central line and one from peripheral site [[id number]] Collected: 12/15/24 1257    Order Status: Completed Specimen: Blood from Peripheral Updated: 12/15/24 1608     Significant Indicator NEG     Source BLD     Site PERIPHERAL     Culture Result No Growth  Note: Blood cultures are incubated for 5 days and  are monitored continuously.Positive blood cultures  are called to the RN and reported as soon as  they are identified.      Blood Culture - Draw one from central line and one from peripheral site [[id number]] Collected: 12/15/24 1321    Order Status: Completed Specimen: Blood from Line Updated: 12/15/24 1608     Significant Indicator NEG     Source BLD     Site Peripheral     Culture Result No Growth  Note: Blood cultures are incubated for 5 days and  are monitored continuously.Positive blood cultures  are called to the RN and reported as soon as  they are identified.      Urine Culture (New) [[id number]] Collected: 12/15/24 1453    Order Status: Sent Specimen: Urine, Clean Catch Updated: 12/15/24 1530    Urinalysis [[id number]]  (Abnormal) Collected: 12/15/24 1453    Order Status: Completed Specimen: Urine, Clean Catch Updated: 12/15/24 1521     Color Yellow     Character Clear     Specific Gravity 1.020     Ph 5.5     Glucose Negative mg/dL      Ketones Negative mg/dL      Protein 30 mg/dL      Bilirubin Negative     Urobilinogen, Urine 0.2 EU/dL      Nitrite Negative     Leukocyte Esterase Small     Occult Blood Large     Micro Urine Req Microscopic    URINALYSIS,CULTURE IF INDICATED [[id number]]     Order Status: Canceled Specimen: Urine, Clean Catch             IMAGES:  CT-ABDOMEN-PELVIS W/O   Final Result      No acute abnormality in the abdomen or pelvis.         DX-CHEST-PORTABLE (1 VIEW)   Final Result      No evidence of acute cardiopulmonary process.          CONSULTS:   None    ASSESSMENT/PLAN:    24 y.o. female admitted for lactic acidosis with concern of sepsis    I anticipate this patient will require at least two midnights for appropriate medical management, necessitating inpatient admission because lactic acidosis with concern of sepsis    Patient will need a Telemetry bed secondary to lactic acidosis with concern of sepsis      * Lactic acidosis- (present on admission)  Assessment & Plan  Patient with elevated lactic acid to 4.0 on day of admission and hypotension.  Patient with 2 out of 4 SIRS criteria. Suspect sepsis, source likely viral gastroenteritis versus UTI.  Chest x-ray without acute process.  CT abdomen and pelvis without acute process. Repeat lactic acid 6.6 prior to sepsis bolus, received Rocephin and Flagyl in ED  - Consulting ICU given lactate of 6.6  - Continue MIVF  - Continue Rocephin and will narrow as indicated  - Trend lactate and bolus as indicated  - Follow-up GI PCR panel  - Follow-up UA  - Follow-up urine and blood cultures    Nausea and vomiting  Assessment & Plan  Likely secondary to viral gastroenteritis.  Nonbilious nonbloody per patient.  Patient with promethazine allergy.  - Zofran as needed  - Follow-up EKG  - Maintenance IV fluids, p.o. as tolerated    SLE (systemic lupus erythematosus related syndrome) (HCC)- (present on admission)  Assessment & Plan  Patient with history of lupus.  Currently on steroid taper.  Without rash, evidence of kidney injury.  - Home plaquenil and prednisone taper      Core Measures:  Fluids: NS MIVF, bolus as indicated  Lines: PIV  Abx: Ceftriaxone  Diet: Regular  PPX: SCDs/TEDs and enoxaparin ppx given Padua score of 4    CODE Status: Full Code

## 2024-12-15 NOTE — ASSESSMENT & PLAN NOTE
Patient with history of lupus.  Currently on steroid taper.  Without rash, evidence of kidney injury.  - Home plaquenil and prednisone taper

## 2024-12-15 NOTE — ED PROVIDER NOTES
ED Provider Note    CHIEF COMPLAINT  Chief Complaint   Patient presents with    Abdominal Pain    Nausea/Vomiting/Diarrhea       EXTERNAL RECORDS REVIEWED  Outpatient Notes outpatient office visit 12/3/2024 when the patient visited her doctor for lupus    HPI/ROS  LIMITATION TO HISTORY   Select: : None  OUTSIDE HISTORIAN(S):  Significant other at bedside    Estelle Pappas is a 24 y.o. female who presents to the emerged part with nausea, vomit, diarrhea and crampy abdominal pain.  Past medical history as document below.  Patient does have a history of lupus.  She is managed by a physician in [place] with the [facility].    Last night was at a holiday party and ate mashed potatoes and primary of which she believes may have been undercooked and ultimately causing her symptoms.  Significant other at bedside not ill.  No blood noted in vomitus or stool.  Now here with moderate abdominal discomfort which is again crampy in nature.  Continues to feel quite nauseated.  Denies current pregnancy.    PAST MEDICAL HISTORY   has a past medical history of Alopecia, Anemia, Mixed connective tissue disease (HCC), Raynaud phenomenon, SLE (systemic lupus erythematosus) (HCC), TTP (thrombotic thrombocytopenic purpura) (HCC), TTP (thrombotic thrombocytopenic purpura) (HCC), and Vitiligo.    SURGICAL HISTORY   has a past surgical history that includes cath placement.    FAMILY HISTORY  Family History   Problem Relation Age of Onset    Hyperlipidemia Father     Cancer Neg Hx     Diabetes Neg Hx     Heart Disease Neg Hx     Stroke Neg Hx        SOCIAL HISTORY  Social History     Tobacco Use    Smoking status: Never    Smokeless tobacco: Never   Vaping Use    Vaping status: Never Used   Substance and Sexual Activity    Alcohol use: No    Drug use: No    Sexual activity: Not Currently     Birth control/protection: Abstinence       CURRENT MEDICATIONS  Home Medications       Reviewed by Rosa Dominguez (Pharmacy Tech)  "on 12/15/24 at 1524  Med List Status: Complete     Medication Last Dose Status   cephALEXin (KEFLEX) 500 MG Cap  Active   hydroxychloroquine (PLAQUENIL) 200 MG Tab 12/14/2024 Active   predniSONE (DELTASONE) 5 MG Tab 12/14/2024 Active   Saccharomyces boulardii (PROBIOTIC) 250 MG Cap 12/14/2024 Active                  Audit from Redirected Encounters    **Home medications have not yet been reviewed for this encounter**         ALLERGIES  Allergies   Allergen Reactions    Amoxicillin Hives, Itching and Swelling     Patient stated    Misoprostol Anaphylaxis     Took promethazine and misoprostol together and developed severe anaphylactic reaction. Unclear which caused the reaction.     Promethazine Anaphylaxis     Patient took misoprostol and promethazine together and developed severe anaphylactic reaction. Unclear which caused the reaction.    Iodine Contrast [Diagnostic X-Ray Materials] Rash and Swelling     Developed after contrast CT       PHYSICAL EXAM  VITAL SIGNS: /72   Pulse (!) 103   Temp 36.8 °C (98.3 °F) (Oral)   Resp 18   Ht 1.549 m (5' 1\")   Wt 53.3 kg (117 lb 8.1 oz)   LMP 11/13/2024 (Exact Date)   SpO2 100%   BMI 22.20 kg/m²      Pulse ox interpretation: I interpret this pulse ox as normal.  Constitutional: Alert in no apparent distress.  Appearing nauseated  HENT: No signs of trauma, Bilateral external ears normal, Nose normal.   Eyes: Pupils are equal and reactive  Neck: Normal range of motion, No tenderness, Supple  Cardiovascular: Regular rate and rhythm, no murmurs.  Hypertensive on monitoring.  Not tachycardic.  Thorax & Lungs: Normal breath sounds, No respiratory distress  Abdomen: Bowel sounds normal, Soft, no significant focal tenderness with moderate depth of palpation.  No rebound or guarding.  Skin: Warm, Dry, No erythema, No rash.   Musculoskeletal: Good range of motion in all major joints. No tenderness to palpation or major deformities noted.   Neurologic: Alert , Normal motor " function, Normal sensory function, No focal deficits noted.   Psychiatric: Affect normal, Judgment normal, Mood normal.       EKG/LABS  Results for orders placed or performed during the hospital encounter of 12/15/24   Blood Culture - Draw one from central line and one from peripheral site    Collection Time: 12/15/24 12:57 PM    Specimen: Peripheral; Blood   Result Value Ref Range    Significant Indicator NEG     Source BLD     Site PERIPHERAL     Culture Result       No Growth  Note: Blood cultures are incubated for 5 days and  are monitored continuously.Positive blood cultures  are called to the RN and reported as soon as  they are identified.     Lactic Acid    Collection Time: 12/15/24  1:21 PM   Result Value Ref Range    Lactic Acid 4.0 (HH) 0.5 - 2.0 mmol/L   CBC with Differential    Collection Time: 12/15/24  1:21 PM   Result Value Ref Range    WBC 24.4 (H) 4.8 - 10.8 K/uL    RBC 5.59 (H) 4.20 - 5.40 M/uL    Hemoglobin 15.1 12.0 - 16.0 g/dL    Hematocrit 45.4 37.0 - 47.0 %    MCV 81.2 (L) 81.4 - 97.8 fL    MCH 27.0 27.0 - 33.0 pg    MCHC 33.3 32.2 - 35.5 g/dL    RDW 41.3 35.9 - 50.0 fL    Platelet Count 557 (H) 164 - 446 K/uL    MPV 9.4 9.0 - 12.9 fL    Neutrophils-Polys 87.60 (H) 44.00 - 72.00 %    Lymphocytes 6.60 (L) 22.00 - 41.00 %    Monocytes 4.50 0.00 - 13.40 %    Eosinophils 0.40 0.00 - 6.90 %    Basophils 0.20 0.00 - 1.80 %    Immature Granulocytes 0.70 0.00 - 0.90 %    Nucleated RBC 0.00 0.00 - 0.20 /100 WBC    Neutrophils (Absolute) 21.40 (H) 1.82 - 7.42 K/uL    Lymphs (Absolute) 1.62 1.00 - 4.80 K/uL    Monos (Absolute) 1.09 (H) 0.00 - 0.85 K/uL    Eos (Absolute) 0.09 0.00 - 0.51 K/uL    Baso (Absolute) 0.04 0.00 - 0.12 K/uL    Immature Granulocytes (abs) 0.17 (H) 0.00 - 0.11 K/uL    NRBC (Absolute) 0.00 K/uL   Complete Metabolic Panel    Collection Time: 12/15/24  1:21 PM   Result Value Ref Range    Sodium 138 135 - 145 mmol/L    Potassium 3.3 (L) 3.6 - 5.5 mmol/L    Chloride 102 96 - 112 mmol/L     Co2 18 (L) 20 - 33 mmol/L    Anion Gap 18.0 (H) 7.0 - 16.0    Glucose 132 (H) 65 - 99 mg/dL    Bun 21 8 - 22 mg/dL    Creatinine 0.77 0.50 - 1.40 mg/dL    Calcium 9.5 8.5 - 10.5 mg/dL    Correct Calcium 8.9 8.5 - 10.5 mg/dL    AST(SGOT) 23 12 - 45 U/L    ALT(SGPT) 39 2 - 50 U/L    Alkaline Phosphatase 104 (H) 30 - 99 U/L    Total Bilirubin 0.3 0.1 - 1.5 mg/dL    Albumin 4.8 3.2 - 4.9 g/dL    Total Protein 9.0 (H) 6.0 - 8.2 g/dL    Globulin 4.2 (H) 1.9 - 3.5 g/dL    A-G Ratio 1.1 g/dL   Blood Culture - Draw one from central line and one from peripheral site    Collection Time: 12/15/24  1:21 PM    Specimen: Line; Blood   Result Value Ref Range    Significant Indicator NEG     Source BLD     Site Peripheral     Culture Result       No Growth  Note: Blood cultures are incubated for 5 days and  are monitored continuously.Positive blood cultures  are called to the RN and reported as soon as  they are identified.     HCG QUAL SERUM    Collection Time: 12/15/24  1:21 PM   Result Value Ref Range    Beta-Hcg Qualitative Serum Negative Negative   ESTIMATED GFR    Collection Time: 12/15/24  1:21 PM   Result Value Ref Range    GFR (CKD-EPI) 110 >60 mL/min/1.73 m 2   Urinalysis    Collection Time: 12/15/24  2:53 PM    Specimen: Urine, Clean Catch   Result Value Ref Range    Color Yellow     Character Clear     Specific Gravity 1.020 <1.035    Ph 5.5 5.0 - 8.0    Glucose Negative Negative mg/dL    Ketones Negative Negative mg/dL    Protein 30 (A) Negative mg/dL    Bilirubin Negative Negative    Urobilinogen, Urine 0.2 <=1.0 EU/dL    Nitrite Negative Negative    Leukocyte Esterase Small (A) Negative    Occult Blood Large (A) Negative    Micro Urine Req Microscopic    URINE MICROSCOPIC (W/UA)    Collection Time: 12/15/24  2:53 PM   Result Value Ref Range    WBC 3-5 /hpf    RBC 11-20 (A) 0 - 2 /hpf    Bacteria Few (A) None /hpf    Epithelial Cells 6-10 (A) 0 - 5 /hpf    Urine Casts 0-2 0 - 2 /lpf   Lactic Acid    Collection Time:  12/15/24  3:11 PM   Result Value Ref Range    Lactic Acid 6.6 (HH) 0.5 - 2.0 mmol/L       I have independently interpreted this EKG    RADIOLOGY/PROCEDURES   I have independently interpreted the diagnostic imaging associated with this visit and am waiting the final reading from the radiologist.   My preliminary interpretation is as follows: Chest x-ray without acute consolidative process    Radiologist interpretation:  CT-ABDOMEN-PELVIS W/O   Final Result      No acute abnormality in the abdomen or pelvis.         DX-CHEST-PORTABLE (1 VIEW)   Final Result      No evidence of acute cardiopulmonary process.          COURSE & MEDICAL DECISION MAKING    ASSESSMENT, COURSE AND PLAN  Care Narrative: Patient presenting with GI symptoms as above.  Will complete broad workup.    1420: Patient with elevated white count and lactic acid.  Increased concern for sepsis and therefore additional fluids and empiric antibiotics will be started at this time    DISPOSITION AND DISCUSSIONS  I have discussed management of the patient with the following physicians and SARA's: Hospitalist    Discussion of management with other QHP or appropriate source(s): None     24-year-old presented emerged apartment with above GI symptoms.  Labs as above concerning for profound elevated white count as well as lactic acid.  Sepsis protocol has been followed to include early antibiotics and IV fluids.  Patient has been admitted and trending lactic acid has increased.  Hospitalist aware.  Will continue care at this time.  At this point no direct etiology of infection is identified currently.  Blood and urine cultures pending.    FINAL DIAGNOSIS  1. Nausea and vomiting, unspecified vomiting type    2. Diarrhea of infectious origin         Electronically signed by: Gibran Spaulding M.D., 12/15/2024 1:20 PM

## 2024-12-15 NOTE — ASSESSMENT & PLAN NOTE
Patient with elevated lactic acid to 4.0 on day of admission and hypotension.  Patient with 2 out of 4 SIRS criteria. Suspect sepsis, source likely viral gastroenteritis versus UTI.  Chest x-ray without acute process.  CT abdomen and pelvis without acute process. Repeat lactic acid 6.6 prior to sepsis bolus, received Rocephin and Flagyl in ED  - Consulting ICU given lactate of 6.6  - Continue MIVF  - Continue Rocephin and will narrow as indicated  - Trend lactate and bolus as indicated  - Follow-up GI PCR panel  - Follow-up UA  - Follow-up urine and blood cultures

## 2024-12-15 NOTE — ED NOTES
Med rec updated  and complete.  Allergies reviewed.      Pt confirmed name and date of birth.      Pt recently  finished a course of cephalexin on 12/02/24.    No anticoagulant or antiplatelet medications.      Preferred pharmacy  [facility] = [phone]

## 2024-12-16 VITALS
HEART RATE: 81 BPM | RESPIRATION RATE: 15 BRPM | BODY MASS INDEX: 22.39 KG/M2 | DIASTOLIC BLOOD PRESSURE: 58 MMHG | HEIGHT: 61 IN | OXYGEN SATURATION: 96 % | TEMPERATURE: 99.5 F | WEIGHT: 118.61 LBS | SYSTOLIC BLOOD PRESSURE: 99 MMHG

## 2024-12-16 PROBLEM — A08.11 NOROVIRUS: Status: ACTIVE | Noted: 2024-12-16

## 2024-12-16 LAB
ADV 40+41 DNA STL QL NAA+NON-PROBE: NOT DETECTED
ALBUMIN SERPL BCP-MCNC: 3.6 G/DL (ref 3.2–4.9)
ALBUMIN/GLOB SERPL: 1.2 G/DL
ALP SERPL-CCNC: 70 U/L (ref 30–99)
ALT SERPL-CCNC: 26 U/L (ref 2–50)
ANION GAP SERPL CALC-SCNC: 10 MMOL/L (ref 7–16)
AST SERPL-CCNC: 20 U/L (ref 12–45)
ASTRO TYP 1-8 RNA STL QL NAA+NON-PROBE: NOT DETECTED
BASOPHILS # BLD AUTO: 0.1 % (ref 0–1.8)
BASOPHILS # BLD: 0.01 K/UL (ref 0–0.12)
BILIRUB SERPL-MCNC: 0.4 MG/DL (ref 0.1–1.5)
BUN SERPL-MCNC: 10 MG/DL (ref 8–22)
C CAYETANENSIS DNA STL QL NAA+NON-PROBE: NOT DETECTED
C COLI+JEJ+UPSA DNA STL QL NAA+NON-PROBE: NOT DETECTED
CALCIUM ALBUM COR SERPL-MCNC: 8.2 MG/DL (ref 8.5–10.5)
CALCIUM SERPL-MCNC: 7.9 MG/DL (ref 8.5–10.5)
CHLORIDE SERPL-SCNC: 103 MMOL/L (ref 96–112)
CO2 SERPL-SCNC: 23 MMOL/L (ref 20–33)
CREAT SERPL-MCNC: 0.62 MG/DL (ref 0.5–1.4)
CRYPTOSP DNA STL QL NAA+NON-PROBE: NOT DETECTED
E HISTOLYT DNA STL QL NAA+NON-PROBE: NOT DETECTED
EAEC PAA PLAS AGGR+AATA ST NAA+NON-PRB: NOT DETECTED
EC STX1+STX2 GENES STL QL NAA+NON-PROBE: NOT DETECTED
EOSINOPHIL # BLD AUTO: 0 K/UL (ref 0–0.51)
EOSINOPHIL NFR BLD: 0 % (ref 0–6.9)
EPEC EAE GENE STL QL NAA+NON-PROBE: NOT DETECTED
ERYTHROCYTE [DISTWIDTH] IN BLOOD BY AUTOMATED COUNT: 41.2 FL (ref 35.9–50)
ETEC LTA+ST1A+ST1B TOX ST NAA+NON-PROBE: NOT DETECTED
G LAMBLIA DNA STL QL NAA+NON-PROBE: NOT DETECTED
GFR SERPLBLD CREATININE-BSD FMLA CKD-EPI: 127 ML/MIN/1.73 M 2
GLOBULIN SER CALC-MCNC: 2.9 G/DL (ref 1.9–3.5)
GLUCOSE SERPL-MCNC: 102 MG/DL (ref 65–99)
HCT VFR BLD AUTO: 33.5 % (ref 37–47)
HGB BLD-MCNC: 11.2 G/DL (ref 12–16)
IMM GRANULOCYTES # BLD AUTO: 0.05 K/UL (ref 0–0.11)
IMM GRANULOCYTES NFR BLD AUTO: 0.5 % (ref 0–0.9)
LACTATE SERPL-SCNC: 1.8 MMOL/L (ref 0.5–2)
LYMPHOCYTES # BLD AUTO: 1.06 K/UL (ref 1–4.8)
LYMPHOCYTES NFR BLD: 9.8 % (ref 22–41)
MCH RBC QN AUTO: 26.9 PG (ref 27–33)
MCHC RBC AUTO-ENTMCNC: 33.4 G/DL (ref 32.2–35.5)
MCV RBC AUTO: 80.5 FL (ref 81.4–97.8)
MONOCYTES # BLD AUTO: 0.48 K/UL (ref 0–0.85)
MONOCYTES NFR BLD AUTO: 4.4 % (ref 0–13.4)
NEUTROPHILS # BLD AUTO: 9.23 K/UL (ref 1.82–7.42)
NEUTROPHILS NFR BLD: 85.2 % (ref 44–72)
NOROVIRUS GI RNA STL QL NAA+PROBE: NOT DETECTED
NOROVIRUS GI+II RNA STL QL NAA+NON-PROBE: DETECTED
NOROVIRUS GII RNA STL QL NAA+PROBE: DETECTED
NRBC # BLD AUTO: 0 K/UL
NRBC BLD-RTO: 0 /100 WBC (ref 0–0.2)
P SHIGELLOIDES DNA STL QL NAA+NON-PROBE: NOT DETECTED
PLATELET # BLD AUTO: 361 K/UL (ref 164–446)
PMV BLD AUTO: 9.3 FL (ref 9–12.9)
POTASSIUM SERPL-SCNC: 3.8 MMOL/L (ref 3.6–5.5)
PROT SERPL-MCNC: 6.5 G/DL (ref 6–8.2)
RBC # BLD AUTO: 4.16 M/UL (ref 4.2–5.4)
RVA RNA STL QL NAA+NON-PROBE: NOT DETECTED
S ENT+BONG DNA STL QL NAA+NON-PROBE: NOT DETECTED
SAPO I+II+IV+V RNA STL QL NAA+NON-PROBE: NOT DETECTED
SHIGELLA SP+EIEC IPAH ST NAA+NON-PROBE: NOT DETECTED
SODIUM SERPL-SCNC: 136 MMOL/L (ref 135–145)
V CHOL+PARA+VUL DNA STL QL NAA+NON-PROBE: NOT DETECTED
V CHOLERAE DNA STL QL NAA+NON-PROBE: NOT DETECTED
WBC # BLD AUTO: 10.8 K/UL (ref 4.8–10.8)
Y ENTEROCOL DNA STL QL NAA+NON-PROBE: NOT DETECTED

## 2024-12-16 PROCEDURE — 700105 HCHG RX REV CODE 258: Performed by: HOSPITALIST

## 2024-12-16 PROCEDURE — 83605 ASSAY OF LACTIC ACID: CPT

## 2024-12-16 PROCEDURE — 700111 HCHG RX REV CODE 636 W/ 250 OVERRIDE (IP)

## 2024-12-16 PROCEDURE — 80053 COMPREHEN METABOLIC PANEL: CPT

## 2024-12-16 PROCEDURE — 85025 COMPLETE CBC W/AUTO DIFF WBC: CPT

## 2024-12-16 PROCEDURE — 99239 HOSP IP/OBS DSCHRG MGMT >30: CPT | Performed by: HOSPITALIST

## 2024-12-16 RX ADMIN — SODIUM CHLORIDE, POTASSIUM CHLORIDE, SODIUM LACTATE AND CALCIUM CHLORIDE: 600; 310; 30; 20 INJECTION, SOLUTION INTRAVENOUS at 02:27

## 2024-12-16 RX ADMIN — PREDNISONE 10 MG: 10 TABLET ORAL at 09:44

## 2024-12-16 ASSESSMENT — PAIN DESCRIPTION - PAIN TYPE
TYPE: ACUTE PAIN

## 2024-12-16 ASSESSMENT — FIBROSIS 4 INDEX
FIB4 SCORE: 0.16
FIB4 SCORE: 0.16

## 2024-12-16 NOTE — PROGRESS NOTES
4 Eyes Skin Assessment Completed by EMILIANA gardner and EMILIANA siddiqi.    Head WDL  Ears WDL  Nose WDL  Mouth WDL  Neck WDL  Breast/Chest WDL  Shoulder Blades WDL  Spine WDL scab on R back from bug bite per pt  (R) Arm/Elbow/Hand WDL  (L) Arm/Elbow/Hand WDL  Abdomen WDL  Groin WDL  Scrotum/Coccyx/Buttocks WDL  (R) Leg WDL  (L) Leg WDL  (R) Heel/Foot/Toe WDL  (L) Heel/Foot/Toe WDL          Devices In Places Tele Box, Blood Pressure Cuff, and Pulse Ox      Interventions In Place Pillows and Low Air Loss Mattress    Possible Skin Injury No    Pictures Uploaded Into Epic N/A  Wound Consult Placed N/A  RN Wound Prevention Protocol Ordered No

## 2024-12-16 NOTE — PROGRESS NOTES
D/c instructions given, educated on worsening s/s. Pt understands and questions answered. D/c home with father

## 2024-12-16 NOTE — ED NOTES
Pt medicated per MAR  Pt transported to [facility] with RN via gurney.  Pt transported with all belongings, on monitor and RA oxygen. Pt awake and oriented.

## 2024-12-16 NOTE — PROGRESS NOTES
I spoke with Dr. Mcnamara and discussed the case at length. Lactic acid has gone up from 4 to 6.6 despite IV fluids.  Patient will transfer to the [facility] for serial lactic acids, sepsis protocol initiated, and IV lactated Ringer's at 150 mL/h.  I evaluated the patient personally. Her father is at bedside. I reviewed the note by Dr. Thompson. Stool studies ordered. Serial lactates ordered.

## 2024-12-16 NOTE — ASSESSMENT & PLAN NOTE
Likely secondary to viral gastroenteritis.  Nonbilious nonbloody per patient.  Patient with promethazine allergy.  - Zofran as needed  - Follow-up EKG  - Maintenance IV fluids, p.o. as tolerated

## 2024-12-16 NOTE — CARE PLAN
The patient is Stable - Low risk of patient condition declining or worsening    Shift Goals  Clinical Goals: hemodynamic stability, monitor fever, trend lactic  Patient Goals: sleep  Family Goals: Updates    Progress made toward(s) clinical / shift goals:    Problem: Pain - Standard  Goal: Alleviation of pain or a reduction in pain to the patient’s comfort goal  Outcome: Progressing     Problem: Hemodynamics  Goal: Patient's hemodynamics, fluid balance and neurologic status will be stable or improve  Outcome: Progressing     Problem: Respiratory  Goal: Patient will achieve/maintain optimum respiratory ventilation and gas exchange  Outcome: Progressing     Problem: Physical Regulation  Goal: Diagnostic test results will improve  Outcome: Progressing

## 2024-12-16 NOTE — CONSULTS
Brief intensivist note    Pt presented with profuse watery diarrhea and N/V >10x today  Initial lactate was 4  Received total 2.6L fluid boluses  No hypotension and pt alert, and oriented  Repeat lactate was 6.6 and I was called for appropriateness of IMCU   I came to bedside and pt appears appropriate but appears dry   Cap refill about 2-3 secs  HD stable at the moment, tachycardic.     Can use more fluids resuscitation  Give more fluid boluses and reassess  Further work up for diarrhea per Hospital Medicine    Agree for IMCU admission   D/w FM team and RTOC was notified.     Nathaniel Thompson D.O.

## 2024-12-16 NOTE — DISCHARGE SUMMARY
Discharge Summary    CHIEF COMPLAINT ON ADMISSION  Chief Complaint   Patient presents with    Abdominal Pain    Nausea/Vomiting/Diarrhea       Reason for Admission  Flu like symptoms     Admission Date  12/15/2024    CODE STATUS  Full Code    HPI & HOSPITAL COURSE  This is a 24 y.o. female here with vomiting and diarrhea.   Ms. Sofya Pappas has a past medical history of systemic lupus erythematosus on current prednisone taper by Dr. Deshpande rheumatology and chronic Plaquenil that presents emergency room on 12/18/2024 with vomiting and diarrhea that started that morning.  She has no sick contacts.  The diarrhea was uncontrolled with fecal incontinence.  She presented to emergency room and CT of the abdomen pelvis was unremarkable.  She was found to have white blood cell count of 24,000 and a lactic acid initially of 4 and despite IV fluids went up to 6.6.  Emergency room she was given antibiotics which have been stopped.  She was transferred to the [facility].  Fecal PCR test was positive for norovirus.   Today she is tolerating a diet and oral fluids. Her WBC came down to 10.8. She feels much better and is anxious to go home.   She was given off-work notes for her 2 jobs this week as she is infectious.   Therefore, she is discharged in good and stable condition to home with close outpatient follow-up.    The patient met 2-midnight criteria for an inpatient stay at the time of discharge.    Discharge Date  12/16    FOLLOW UP ITEMS POST DISCHARGE  PCP  Dr. Deshpande    DISCHARGE DIAGNOSES  Principal Problem:    Lactic acidosis (POA: Yes)  Active Problems:    Norovirus (POA: Yes)    SLE (systemic lupus erythematosus related syndrome) (HCC) (POA: Yes)    Nausea and vomiting (POA: Unknown)  Resolved Problems:    * No resolved hospital problems. *      FOLLOW UP  Future Appointments   Date Time Provider Department Center   3/18/2025  3:00 PM Naomie Deshpande M.D. [facility] None     No follow-up provider specified.    MEDICATIONS ON  DISCHARGE     Medication List        CONTINUE taking these medications        Instructions   hydroxychloroquine 200 MG Tabs  Commonly known as: Plaquenil   Take 2 Tablets by mouth every day.  Dose: 400 mg     predniSONE 5 MG Tabs  Commonly known as: Deltasone   20mg (4 tabs) daily for 5 days then 15mg (3 tabs) daily for 5 days then 10mg (2 tabs) daily for 5 days then 5mg (1 tab) daily for 5 days. Take in AM with food.     Probiotic 250 MG Caps   Take 250 mg by mouth every day.  Dose: 250 mg            STOP taking these medications      cephALEXin 500 MG Caps  Commonly known as: Keflex              Allergies  Allergies   Allergen Reactions    Amoxicillin Hives, Itching and Swelling     Patient stated    Misoprostol Anaphylaxis     Took promethazine and misoprostol together and developed severe anaphylactic reaction. Unclear which caused the reaction.     Promethazine Anaphylaxis     Patient took misoprostol and promethazine together and developed severe anaphylactic reaction. Unclear which caused the reaction.    Iodine Contrast [Diagnostic X-Ray Materials] Rash and Swelling     Developed after contrast CT       DIET  Orders Placed This Encounter   Procedures    Diet Order Diet: Regular     Standing Status:   Standing     Number of Occurrences:   1     Order Specific Question:   Diet:     Answer:   Regular [1]       ACTIVITY  As tolerated.      CONSULTATIONS  none    PROCEDURES  none    LABORATORY  Lab Results   Component Value Date    SODIUM 136 12/16/2024    POTASSIUM 3.8 12/16/2024    CHLORIDE 103 12/16/2024    CO2 23 12/16/2024    GLUCOSE 102 (H) 12/16/2024    BUN 10 12/16/2024    CREATININE 0.62 12/16/2024        Lab Results   Component Value Date    WBC 10.8 12/16/2024    HEMOGLOBIN 11.2 (L) 12/16/2024    HEMATOCRIT 33.5 (L) 12/16/2024    PLATELETCT 361 12/16/2024      CT-ABDOMEN-PELVIS W/O   Final Result      No acute abnormality in the abdomen or pelvis.         DX-CHEST-PORTABLE (1 VIEW)   Final Result       No evidence of acute cardiopulmonary process.            Total time of the discharge process exceeds 34 minutes.

## 2024-12-16 NOTE — CARE PLAN
The patient is Stable - Low risk of patient condition declining or worsening    Shift Goals  Clinical Goals: hemodynamic stability, increase fluid/food intake, monitor temp  Patient Goals: get better  Family Goals: jacob    Progress made toward(s) clinical / shift goals:    Problem: Pain - Standard  Goal: Alleviation of pain or a reduction in pain to the patient’s comfort goal  Outcome: Met     Problem: Hemodynamics  Goal: Patient's hemodynamics, fluid balance and neurologic status will be stable or improve  Outcome: Met     Problem: Respiratory  Goal: Patient will achieve/maintain optimum respiratory ventilation and gas exchange  Outcome: Met     Problem: Physical Regulation  Goal: Diagnostic test results will improve  Outcome: Met       Patient is not progressing towards the following goals:N/A

## 2024-12-16 NOTE — PROGRESS NOTES
Late entry     Pt temp 103f orally, , /56, pt cheeks red and flushed, pt denies SOB or difficulty breathing, pt states she has had allergic reaction before and does not have any of the same symptoms, tylenol given and ice packs placed behind neck and under armpits, MD Vazquez notified, respiratory panel ordered.

## 2024-12-17 LAB
BACTERIA UR CULT: ABNORMAL
BACTERIA UR CULT: ABNORMAL
SIGNIFICANT IND 70042: ABNORMAL
SITE SITE: ABNORMAL
SOURCE SOURCE: ABNORMAL

## 2024-12-18 LAB
BACTERIA STL CULT: NORMAL
SIGNIFICANT IND 70042: NORMAL
SITE SITE: NORMAL
SOURCE SOURCE: NORMAL
